# Patient Record
Sex: MALE | Employment: UNEMPLOYED | URBAN - METROPOLITAN AREA
[De-identification: names, ages, dates, MRNs, and addresses within clinical notes are randomized per-mention and may not be internally consistent; named-entity substitution may affect disease eponyms.]

---

## 2021-10-04 ENCOUNTER — APPOINTMENT (OUTPATIENT)
Dept: GENERAL RADIOLOGY | Age: 26
End: 2021-10-04
Payer: MEDICAID

## 2021-10-04 ENCOUNTER — HOSPITAL ENCOUNTER (EMERGENCY)
Age: 26
Discharge: HOME | End: 2021-10-04
Attending: EMERGENCY MEDICINE
Payer: MEDICAID

## 2021-10-04 VITALS
OXYGEN SATURATION: 100 % | WEIGHT: 220 LBS | HEART RATE: 66 BPM | SYSTOLIC BLOOD PRESSURE: 142 MMHG | RESPIRATION RATE: 16 BRPM | TEMPERATURE: 97.9 F | DIASTOLIC BLOOD PRESSURE: 89 MMHG

## 2021-10-04 DIAGNOSIS — S86.012A RUPTURE OF LEFT ACHILLES TENDON, INITIAL ENCOUNTER: Primary | ICD-10-CM

## 2021-10-04 DIAGNOSIS — Z23 NEED FOR TETANUS BOOSTER: ICD-10-CM

## 2021-10-04 DIAGNOSIS — S91.012A LACERATION OF LEFT ANKLE, INITIAL ENCOUNTER: ICD-10-CM

## 2021-10-04 PROCEDURE — 6360000002 HC RX W HCPCS: Performed by: STUDENT IN AN ORGANIZED HEALTH CARE EDUCATION/TRAINING PROGRAM

## 2021-10-04 PROCEDURE — 99283 EMERGENCY DEPT VISIT LOW MDM: CPT

## 2021-10-04 PROCEDURE — 29505 APPLICATION LONG LEG SPLINT: CPT

## 2021-10-04 PROCEDURE — 73610 X-RAY EXAM OF ANKLE: CPT

## 2021-10-04 PROCEDURE — 90715 TDAP VACCINE 7 YRS/> IM: CPT | Performed by: STUDENT IN AN ORGANIZED HEALTH CARE EDUCATION/TRAINING PROGRAM

## 2021-10-04 PROCEDURE — 6370000000 HC RX 637 (ALT 250 FOR IP): Performed by: STUDENT IN AN ORGANIZED HEALTH CARE EDUCATION/TRAINING PROGRAM

## 2021-10-04 PROCEDURE — 90471 IMMUNIZATION ADMIN: CPT | Performed by: STUDENT IN AN ORGANIZED HEALTH CARE EDUCATION/TRAINING PROGRAM

## 2021-10-04 RX ORDER — ACETAMINOPHEN 500 MG
1000 TABLET ORAL ONCE
Status: COMPLETED | OUTPATIENT
Start: 2021-10-04 | End: 2021-10-04

## 2021-10-04 RX ORDER — IBUPROFEN 400 MG/1
400 TABLET ORAL ONCE
Status: COMPLETED | OUTPATIENT
Start: 2021-10-04 | End: 2021-10-04

## 2021-10-04 RX ORDER — HYDROCODONE BITARTRATE AND ACETAMINOPHEN 5; 325 MG/1; MG/1
1 TABLET ORAL EVERY 6 HOURS PRN
Qty: 10 TABLET | Refills: 0 | Status: SHIPPED | OUTPATIENT
Start: 2021-10-04 | End: 2021-10-07

## 2021-10-04 RX ORDER — DOXYCYCLINE 100 MG/1
100 TABLET ORAL 2 TIMES DAILY
Qty: 20 TABLET | Refills: 0 | Status: ON HOLD | OUTPATIENT
Start: 2021-10-04 | End: 2021-10-08 | Stop reason: HOSPADM

## 2021-10-04 RX ORDER — LIDOCAINE HYDROCHLORIDE AND EPINEPHRINE 10; 10 MG/ML; UG/ML
20 INJECTION, SOLUTION INFILTRATION; PERINEURAL ONCE
Status: DISCONTINUED | OUTPATIENT
Start: 2021-10-04 | End: 2021-10-04 | Stop reason: HOSPADM

## 2021-10-04 RX ADMIN — TETANUS TOXOID, REDUCED DIPHTHERIA TOXOID AND ACELLULAR PERTUSSIS VACCINE, ADSORBED 0.5 ML: 5; 2.5; 8; 8; 2.5 SUSPENSION INTRAMUSCULAR at 17:23

## 2021-10-04 RX ADMIN — ACETAMINOPHEN 1000 MG: 500 TABLET ORAL at 16:26

## 2021-10-04 RX ADMIN — IBUPROFEN 400 MG: 400 TABLET, FILM COATED ORAL at 16:26

## 2021-10-04 ASSESSMENT — ENCOUNTER SYMPTOMS
TROUBLE SWALLOWING: 0
EYE PAIN: 0
ABDOMINAL PAIN: 0
COUGH: 0
RHINORRHEA: 0
VOMITING: 0
EYE REDNESS: 0
CONSTIPATION: 0
SHORTNESS OF BREATH: 0
COLOR CHANGE: 0
DIARRHEA: 0
SINUS PAIN: 0

## 2021-10-04 ASSESSMENT — PAIN SCALES - GENERAL
PAINLEVEL_OUTOF10: 8
PAINLEVEL_OUTOF10: 9

## 2021-10-04 ASSESSMENT — PAIN DESCRIPTION - LOCATION: LOCATION: ANKLE

## 2021-10-04 ASSESSMENT — PAIN DESCRIPTION - ORIENTATION: ORIENTATION: LEFT

## 2021-10-04 ASSESSMENT — PAIN DESCRIPTION - PAIN TYPE: TYPE: ACUTE PAIN

## 2021-10-04 NOTE — ED PROVIDER NOTES
Transportation Needs:     Lack of Transportation (Medical):  Lack of Transportation (Non-Medical):    Physical Activity:     Days of Exercise per Week:     Minutes of Exercise per Session:    Stress:     Feeling of Stress :    Social Connections:     Frequency of Communication with Friends and Family:     Frequency of Social Gatherings with Friends and Family:     Attends Orthodox Services:     Active Member of Clubs or Organizations:     Attends Club or Organization Meetings:     Marital Status:    Intimate Partner Violence:     Fear of Current or Ex-Partner:     Emotionally Abused:     Physically Abused:     Sexually Abused:        History reviewed. No pertinent family history. Allergies:  Patient has no known allergies. Home Medications:  Prior to Admission medications    Medication Sig Start Date End Date Taking? Authorizing Provider   doxycycline monohydrate (ADOXA) 100 MG tablet Take 1 tablet by mouth 2 times daily for 10 days 10/4/21 10/14/21 Yes Arabella Holder DO   HYDROcodone-acetaminophen (NORCO) 5-325 MG per tablet Take 1 tablet by mouth every 6 hours as needed for Pain for up to 3 days. 10/4/21 10/7/21 Yes Gregg Pastor MD       REVIEW OFSYSTEMS    (2-9 systems for level 4, 10 or more for level 5)      Review of Systems   Constitutional: Negative for chills and fever. HENT: Negative for congestion, rhinorrhea and trouble swallowing. Eyes: Negative for pain and redness. Respiratory: Negative for cough and shortness of breath. Cardiovascular: Negative for chest pain and palpitations. Gastrointestinal: Negative for abdominal pain and vomiting. Musculoskeletal: Positive for arthralgias and gait problem. Skin: Positive for wound. Negative for rash. Neurological: Negative for dizziness, syncope and headaches. Hematological: Does not bruise/bleed easily. Psychiatric/Behavioral: Negative for confusion.         Positive for feeling anxious       PHYSICAL EXAM   (up to 7 for level 4, 8 or more forlevel 5)      INITIAL VITALS:   ED Triage Vitals   BP Temp Temp Source Pulse Resp SpO2 Height Weight   10/04/21 1525 10/04/21 1522 10/04/21 1522 10/04/21 1522 10/04/21 1522 10/04/21 1522 -- 10/04/21 1522   (!) 142/89 97.9 °F (36.6 °C) Oral 66 16 100 %  220 lb (99.8 kg)       Physical Exam  Vitals reviewed. Constitutional:       Appearance: Normal appearance. He is not ill-appearing. HENT:      Head: Normocephalic and atraumatic. Right Ear: External ear normal.      Left Ear: External ear normal.      Nose: Nose normal.      Mouth/Throat:      Mouth: Mucous membranes are moist.   Eyes:      General:         Right eye: No discharge. Left eye: No discharge. Extraocular Movements: Extraocular movements intact. Pupils: Pupils are equal, round, and reactive to light. Cardiovascular:      Rate and Rhythm: Normal rate and regular rhythm. Pulses: Normal pulses. Comments: Bilateral DP pulses 2+  Pulmonary:      Effort: Pulmonary effort is normal. No respiratory distress. Musculoskeletal:      Cervical back: Normal range of motion. No rigidity. Comments: Laceration to posterior left ankle in the region of the Achilles tendon. Patient able to dorsiflex and plantarflex ankle. Patient able to wiggle toes without difficulty. When calf is squeezed there is no movement of the left foot. At rest left foot is resting in a slightly plantarflexed position. Skin:     General: Skin is warm. Capillary Refill: Capillary refill takes less than 2 seconds. Comments: Approximately 2 cm laceration to left posterior ankle. Bleeding well controlled. Neurological:      Mental Status: He is alert and oriented to person, place, and time. Cranial Nerves: No cranial nerve deficit.       Comments: Decreased sensation to left calf   Psychiatric:      Comments: Anxious         DIFFERENTIAL  DIAGNOSIS     PLAN (LABS / IMAGING / EKG):  Orders Placed This Encounter   Procedures    XR ANKLE LEFT (MIN 3 VIEWS)    Inpatient consult to Podiatry       MEDICATIONS ORDERED:  Orders Placed This Encounter   Medications    Tetanus-Diphth-Acell Pertussis (BOOSTRIX) injection 0.5 mL    acetaminophen (TYLENOL) tablet 1,000 mg    ibuprofen (ADVIL;MOTRIN) tablet 400 mg    lidocaine-EPINEPHrine 1 %-1:[id number] injection 20 mL    doxycycline monohydrate (ADOXA) 100 MG tablet     Sig: Take 1 tablet by mouth 2 times daily for 10 days     Dispense:  20 tablet     Refill:  0    HYDROcodone-acetaminophen (NORCO) 5-325 MG per tablet     Sig: Take 1 tablet by mouth every 6 hours as needed for Pain for up to 3 days. Dispense:  10 tablet     Refill:  0       DDX: Simple laceration, partial tear of Achilles tendon, complete tear of Achilles tendon, foreign body    Initial MDM/Plan: 27 y.o. male who presents with laceration to left ankle in the Achilles tendon region. Patient is able to plantarflex left foot however squeezing of the left calf does not result in complete plantarflexion. Concern for involvement of the Achilles tendon. Will obtain x-ray to rule out foreign body or fracture and possibly evaluate for Achilles tendon involvement. Will washout the laceration with further investigate for tendon involvement and close the laceration. Will update tetanus    DIAGNOSTIC RESULTS / EMERGENCYDEPARTMENT COURSE / MDM     LABS:  Labs Reviewed - No data to display      RADIOLOGY:  XR ANKLE LEFT (MIN 3 VIEWS)    Result Date: 10/4/2021  EXAMINATION: THREE XRAY VIEWS OF THE LEFT ANKLE 10/4/2021 3:55 pm COMPARISON: None. HISTORY: ORDERING SYSTEM PROVIDED HISTORY: laceration, eval for foreign body and tendon involvement TECHNOLOGIST PROVIDED HISTORY: laceration, eval for foreign body and tendon involvement FINDINGS: No evidence of acute fracture or dislocation. Normal alignment of the ankle mortise. No focal osseous lesion. No evidence of joint effusion.  No focal soft tissue abnormality. No radiodense foreign body. No acute abnormality of the ankle. No radiodense foreign body. EKG  None    All EKG's are interpreted by the Emergency Department Physicianwho either signs or Co-signs this chart in the absence of a cardiologist.    EMERGENCY DEPARTMENT COURSE:  ED Course as of Oct 04 1925   [facility] Oct 04, 2021   1617 X-ray left ankle negative for any foreign body or fracture    [AB]   1628 Reevaluated patient. Patient attempted to ambulate to the bathroom but was unable to walk. Podiatry consulted for further evaluation.    [AB]   [id number] with podiatry. State they will come to bedside to evaluate the patient.    [AB]   8933 Podiatry at bedside. After their evaluation they are concerned the Achilles tendon is completely severed and will need operative repair. After discussion with patient and since the patient ate today he will follow-up in their clinic in a few days and plan for operative fix in 7 to 10 days. Wound irrigated and dressed. Left lower leg splinted per podiatry in the emergency department. Patient will be discharged home on doxycycline. Patient given strict return precautions and how to reach the podiatry office.    [AB]      ED Course User Index  [AB] Aldean Blizzard, DO         PROCEDURES:  None    CONSULTS:  IP CONSULT TO PODIATRY    CRITICAL CARE:  None    FINAL IMPRESSION      1. Rupture of left Achilles tendon, initial encounter    2. Need for tetanus booster    3.  Laceration of left ankle, initial encounter        DISPOSITION / PLAN     DISPOSITION discharged      PATIENT REFERRED TO:  Juan Bobby DPM  [address]  [phone]    In 3 days  for pre-op evaluation    [facility]  [address] [facility] [address]  [phone]  Go to   As needed, If symptoms worsen      DISCHARGE MEDICATIONS:  Discharge Medication List as of 10/4/2021  6:19 PM      START taking these medications    Details doxycycline monohydrate (ADOXA) 100 MG tablet Take 1 tablet by mouth 2 times daily for 10 days, Disp-20 tablet, R-0Print             Paris Henley DO  Emergency Medicine Resident    (Please note that portions of this note were completed with a voice recognition program.Efforts were made to edit the dictations but occasionally words are mis-transcribed.)       Brigette Morse DO  Resident  10/04/21 1928

## 2021-10-04 NOTE — ED PROVIDER NOTES
[facility]  Emergency Department  Faculty Attestation     I performed a history and physical examination of the patient and discussed management with the resident. I reviewed the residents note and agree with the documented findings and plan of care. Any areas of disagreement are noted on the chart. I was personally present for the key portions of any procedures. I have documented in the chart those procedures where I was not present during the key portions. I have reviewed the emergency nurses triage note. I agree with the chief complaint, past medical history, past surgical history, allergies, medications, social and family history as documented unless otherwise noted below. For Physician Assistant/ Nurse Practitioner cases/documentation I have personally evaluated this patient and have completed at least one if not all key elements of the E/M (history, physical exam, and MDM). Additional findings are as noted. Primary Care Physician:  No primary care provider on file. Screenings:  [unfilled]    CHIEF COMPLAINT       Chief Complaint   Patient presents with    Laceration     left achilles tendon. RECENT VITALS:   Temp: 97.9 °F (36.6 °C),  Pulse: 66, Resp: 16, BP: (!) 142/89    LABS:  Labs Reviewed - No data to display    Radiology  No orders to display           Attending Physician Additional  Notes    Patient has laceration to his left Achilles tendon region. He was swinging a garbage bag which opened up and something within the back of his left ankle. He states the ankle felt weak and it made him stumble. He had a large amount of blood loss. He is worried that he cut his tendon. He is not up-to-date on tetanus. No antibiotic allergies. On exam he is anxious hypertensive afebrile nontoxic vital signs otherwise normal.  Normal pulses and sensation light touch. Full range of movement of the toes and ankle. He has normal plantar flexion. Squeezing the calf however does not result in a full plantar flexion. There is a 1.5 cm horizontal laceration above the calcaneus in the Achilles tendon region. There is no active bleeding. It is no visible foreign body. Impression is ankle laceration, possible Achilles tendon involvement, likely partial.  Plan is imaging, tetanus update, wound care including local anesthesia, copious irrigation, visual inspection through passive range of movement. Sutures, consider bedside ultrasound to assess tendon. If tendon involvement will consult orthopedics and recommend crutches and consider splinting in plantar flexion. Octavia Montnao.  Rupert Abdalla MD, [facility]  Attending Emergency  Physician               Ramona Henderson MD  10/04/21 7062

## 2021-10-04 NOTE — CONSULTS
Consultation Note  Podiatric Medicine and Surgery     Subjective     Chief Complaint: Left posterior heel laceration    HPI:  Stella Boggs is a 27 y.o. male seen at [facility] emergency department for laceration left posterior heel. Patient was taking his garbage noted swung the bag with high velocity contact was made with his left posterior heel at which time he had difficulty with his balance and noticed a large laceration which he immediately placed a makeshift tourniquet over. He has noted inconsistent pain and inability to balance and high levels of anxiety. He irrigated the wound with copious amounts of normal tap water prior to his arrival at the emergency department. His pain is currently controlled 2 out of 10. Patient has no other pedal complaints at this time    PCP is No primary care provider on file. ROS:   Review of Systems   HENT: Negative for rhinorrhea and sinus pain. Cardiovascular: Positive for leg swelling. Gastrointestinal: Negative for constipation and diarrhea. Genitourinary: Negative for frequency and hematuria. Musculoskeletal: Positive for myalgias. Negative for arthralgias. Skin: Positive for wound. Negative for color change and pallor. Psychiatric/Behavioral: Negative for agitation and confusion. Past Medical History   has no past medical history on file. Past Surgical History   has no past surgical history on file. Medications  Prior to Admission medications    Not on File    Scheduled Meds:   lidocaine-EPINEPHrine  20 mL IntraDERmal Once     Continuous Infusions:  PRN Meds:. Allergies  has No Known Allergies. Family History  family history is not on file. Social History   reports that he has never smoked. He has never used smokeless tobacco.   reports current alcohol use. reports current drug use. Drug: Marijuana.     Objective     Vitals:  Patient Vitals for the past 8 hrs:   BP Temp Temp src Pulse Resp SpO2 Weight   10/04/21 1525 (!) and free of all debris. There is no acute signs of infection noted. Imaging:   XR ANKLE LEFT (MIN 3 VIEWS)   Final Result   No acute abnormality of the ankle. No radiodense foreign body. Cultures: na    Assessment     Taylor Stauffer is a 27 y.o. male with   1. Left Achilles tendon laceration, complete, initial encounter  2. Difficulty walking    Active Problems:    Laceration of left ankle  Resolved Problems:    * No resolved hospital problems. *        Plan     · Patient examined and evaluated at bedside   · Treatment options discussed in detail with the patient  · Radiographs reviewed and discussed in detail with patient which demonstrate mild edema without any notable osseous pathology  · Reviewed Achilles laceration with the patient and the appropriate timeline the patient understands it we will be allowing the area to undergo its inflammatory reaction and plan on surgically intervening in approximately 7 to 10 days  · Wound was cleansed with copious amounts of normal saline solution and dressed with Adaptic 4 x 4's Kerlix  · Posterior splint was applied following the application of a Ford compression dressing to reduce edema and control pain  · Doxycycline will be as prescribed by the emergency department as well as pain medication  · NWB status to left lower extremity  · Discussed with Dr. Gabriel Cisneros, BALJINDERM   Podiatric Medicine & Surgery   10/4/2021 at 5:59 PM    This note is created with the assistance of a speech recognition program.  While intending to generate a document that actually reflects the content of the visit, the document can still have some errors including those of syntax and sound a like substitutions which may escape proof reading. In such instances, actual meaning can be extrapolated by contextual diversion. I performed a history and physical examination of the patient and discussed management with the resident.  I reviewed the residents note and agree with the documented findings and plan of care. Any areas of disagreement are noted on the chart. I was personally present for the key portions of any procedures. I have documented in the chart those procedures where I was not present during the key portions. I have reviewed the Podiatry Resident progress note. I agree with the chief complaint, past medical history, past surgical history, allergies, medications, social and family history as documented unless otherwise noted below. Documentation of the HPI, Physical Exam and Medical Decision Making performed by medical students or scribes is based on my personal performance of the HPI, PE and MDM. I have personally evaluated this patient and have completed at least one if not all key elements of the E/M (history, physical exam, and MDM). Additional findings are as noted.      Connor Marcos DPM on 10/5/2021 at 5:24 AM  Board Certified, American Board of Podiatric Surgery  Fellow, [employer]

## 2021-10-04 NOTE — ED NOTES
Patient stated he lost his ID one year ago and has issues filling prescriptions. Discussed with patient that he can fill a script for anti-biotics, however any narcotics prescribed will most likely need ID. GREG encouraged patient to obtain new ID for prescriptions and other purposes. He verbalized understanding.      GREG Busby  10/04/21 1091

## 2021-10-06 ENCOUNTER — OFFICE VISIT (OUTPATIENT)
Dept: PODIATRY | Age: 26
End: 2021-10-06
Payer: MEDICAID

## 2021-10-06 VITALS — BODY MASS INDEX: 29.8 KG/M2 | WEIGHT: 220 LBS | HEIGHT: 72 IN

## 2021-10-06 DIAGNOSIS — S86.022A LACERATION OF ACHILLES TENDON, LEFT, INITIAL ENCOUNTER: Primary | ICD-10-CM

## 2021-10-06 PROCEDURE — 99203 OFFICE O/P NEW LOW 30 MIN: CPT | Performed by: PODIATRIST

## 2021-10-06 PROCEDURE — G8427 DOCREV CUR MEDS BY ELIG CLIN: HCPCS | Performed by: PODIATRIST

## 2021-10-06 PROCEDURE — G8484 FLU IMMUNIZE NO ADMIN: HCPCS | Performed by: PODIATRIST

## 2021-10-06 PROCEDURE — 1036F TOBACCO NON-USER: CPT | Performed by: PODIATRIST

## 2021-10-06 PROCEDURE — G8419 CALC BMI OUT NRM PARAM NOF/U: HCPCS | Performed by: PODIATRIST

## 2021-10-06 ASSESSMENT — ENCOUNTER SYMPTOMS
VOMITING: 0
NAUSEA: 0
DIARRHEA: 0
COLOR CHANGE: 0
CONSTIPATION: 0

## 2021-10-06 NOTE — PROGRESS NOTES
for gait problem. Negative for arthralgias and joint swelling. Skin: Negative for color change, pallor, rash and wound. Neurological: Negative for weakness and numbness. Lower Extremity Physical Examination:     Vitals: There were no vitals filed for this visit. General: AAO x 3 in NAD. Vascular: DP and PT pulses palpable 2/4, Bilateral.  CFT <3 seconds, Bilateral.  Hair growth present to the level of the digits, Bilateral.  Edema present, Left. Varicosities absent, Bilateral. Erythema absent, Left    Neurological:  Sensation present to light touch to level of digits, Bilateral.    Musculoskeletal: Muscle strength unable to plantar flex left, able to use flexors, not achilles/5, Left. Pain present upon palpation of achilles area, Left. Palpable dell left achilles    Integument: Warm, dry, supple, Bilateral.  4 cm laceration left posterior heel, clean, no active bleeding. Gait analysis:     Asessment: Patient is a 27 y.o. male with:   1. Laceration of Achilles tendon, left, initial encounter          Plan: Patient examined and evaluated. Current condition and treatment options discussed in detail. Verbal and written instructions given to patient. Contact office with any questions/problems/concerns. No weight    I discussed in detail repair achilles left, repair laceration left. He/She was in full agreement and understood risks. The reason for surgery is due to unsuccessful non-operative treatment and/or conservative treatment is not a viable option. It was discussed with the patient that compliance postoperatively is of utmost importance. Any deviation on behalf of the patient will decrease the chances of a successful outcome. Patient acknowledged, understands, and would like to move forward with surgery as discussed. The patient was given a consent outlining the general risk of surgery as well as the specifics to the surgical plan.  This was carefully discussed giving all options, indications and contraindications regarding the procedure outlined in the consent. All questions were answered to the patient's satisfaction. The patient signed the consent form confirming complete understanding and acceptance of the risks of stated. I specifically stated and inquired if the patient understands and accepts the risks of surgery including infection, failure, prolonged pain, swelling, numbness, recurrence, limited mobility,painful scar, RSDS, overcorrection, under-correction, and loss of limb/life. Death, bleeding, blood clots in the veins or lungs, tendon or blood vessel disturbance, bony conditions, continued pain,stiffness, weakness, and limited function. These were all listed on the consent. Additionally, the postoperative course and treatment was outlined for the patient. Discussion consisted of postoperatively the patient needs to keep the foot elevated for at least the first initial two weeks. I have encouraged movements such as moving from the bed to the sofa or recliner, to the kitchen and the bathroom; quick bursts of movement with the foot elevated. Longstanding periods of time such as cooking, cleaning, and shopping are not permitted. I reminded the patient that there are only two reasons to have surgery. That being, if their function is impaired and also if they are having pain. If they can answer yes to both these questions, I will move forward with surgery. If they can not, there is no reason to proceed with surgical intervention. No orders of the defined types were placed in this encounter. No orders of the defined types were placed in this encounter. RTC in for surgery.     10/6/2021

## 2021-10-08 ENCOUNTER — ANESTHESIA EVENT (OUTPATIENT)
Dept: OPERATING ROOM | Age: 26
End: 2021-10-08
Payer: MEDICAID

## 2021-10-08 ENCOUNTER — ANESTHESIA (OUTPATIENT)
Dept: OPERATING ROOM | Age: 26
End: 2021-10-08
Payer: MEDICAID

## 2021-10-08 ENCOUNTER — HOSPITAL ENCOUNTER (OUTPATIENT)
Age: 26
Setting detail: OUTPATIENT SURGERY
Discharge: HOME | End: 2021-10-08
Attending: PODIATRIST | Admitting: PODIATRIST
Payer: MEDICAID

## 2021-10-08 VITALS — TEMPERATURE: 96.7 F | SYSTOLIC BLOOD PRESSURE: 108 MMHG | DIASTOLIC BLOOD PRESSURE: 53 MMHG | OXYGEN SATURATION: 100 %

## 2021-10-08 VITALS
HEIGHT: 72 IN | RESPIRATION RATE: 13 BRPM | OXYGEN SATURATION: 98 % | BODY MASS INDEX: 29.8 KG/M2 | HEART RATE: 68 BPM | WEIGHT: 220 LBS | SYSTOLIC BLOOD PRESSURE: 144 MMHG | TEMPERATURE: 98.4 F | DIASTOLIC BLOOD PRESSURE: 94 MMHG

## 2021-10-08 DIAGNOSIS — S86.012A RUPTURE ACHILLES TENDON, LEFT, INITIAL ENCOUNTER: ICD-10-CM

## 2021-10-08 DIAGNOSIS — S91.012D LACERATION OF LEFT ANKLE, SUBSEQUENT ENCOUNTER: Primary | ICD-10-CM

## 2021-10-08 LAB
SARS-COV-2, RAPID: NOT DETECTED
SPECIMEN DESCRIPTION: NORMAL

## 2021-10-08 PROCEDURE — 7100000010 HC PHASE II RECOVERY - FIRST 15 MIN: Performed by: PODIATRIST

## 2021-10-08 PROCEDURE — 3700000000 HC ANESTHESIA ATTENDED CARE: Performed by: PODIATRIST

## 2021-10-08 PROCEDURE — 3700000001 HC ADD 15 MINUTES (ANESTHESIA): Performed by: PODIATRIST

## 2021-10-08 PROCEDURE — C1713 ANCHOR/SCREW BN/BN,TIS/BN: HCPCS | Performed by: PODIATRIST

## 2021-10-08 PROCEDURE — 2580000003 HC RX 258: Performed by: PODIATRIST

## 2021-10-08 PROCEDURE — 7100000001 HC PACU RECOVERY - ADDTL 15 MIN: Performed by: PODIATRIST

## 2021-10-08 PROCEDURE — 6360000002 HC RX W HCPCS

## 2021-10-08 PROCEDURE — 3600000004 HC SURGERY LEVEL 4 BASE: Performed by: PODIATRIST

## 2021-10-08 PROCEDURE — 2580000003 HC RX 258

## 2021-10-08 PROCEDURE — 7100000000 HC PACU RECOVERY - FIRST 15 MIN: Performed by: PODIATRIST

## 2021-10-08 PROCEDURE — 2500000003 HC RX 250 WO HCPCS

## 2021-10-08 PROCEDURE — 2500000003 HC RX 250 WO HCPCS: Performed by: PODIATRIST

## 2021-10-08 PROCEDURE — 27650 REPAIR ACHILLES TENDON: CPT | Performed by: PODIATRIST

## 2021-10-08 PROCEDURE — 7100000011 HC PHASE II RECOVERY - ADDTL 15 MIN: Performed by: PODIATRIST

## 2021-10-08 PROCEDURE — 64445 NJX AA&/STRD SCIATIC NRV IMG: CPT | Performed by: ANESTHESIOLOGY

## 2021-10-08 PROCEDURE — 87635 SARS-COV-2 COVID-19 AMP PRB: CPT

## 2021-10-08 PROCEDURE — 2500000003 HC RX 250 WO HCPCS: Performed by: ANESTHESIOLOGY

## 2021-10-08 PROCEDURE — 2580000003 HC RX 258: Performed by: ANESTHESIOLOGY

## 2021-10-08 PROCEDURE — 64447 NJX AA&/STRD FEMORAL NRV IMG: CPT | Performed by: ANESTHESIOLOGY

## 2021-10-08 PROCEDURE — 6360000002 HC RX W HCPCS: Performed by: ANESTHESIOLOGY

## 2021-10-08 PROCEDURE — 3600000014 HC SURGERY LEVEL 4 ADDTL 15MIN: Performed by: PODIATRIST

## 2021-10-08 PROCEDURE — 2709999900 HC NON-CHARGEABLE SUPPLY: Performed by: PODIATRIST

## 2021-10-08 DEVICE — SYSTEM IMPL INCL NDL 1.3MM WHT WHT/BLUE WHT/BLACK: Type: IMPLANTABLE DEVICE | Site: ACHILLES TENDON | Status: FUNCTIONAL

## 2021-10-08 RX ORDER — LIDOCAINE HYDROCHLORIDE 10 MG/ML
INJECTION, SOLUTION EPIDURAL; INFILTRATION; INTRACAUDAL; PERINEURAL PRN
Status: DISCONTINUED | OUTPATIENT
Start: 2021-10-08 | End: 2021-10-08 | Stop reason: SDUPTHER

## 2021-10-08 RX ORDER — DEXAMETHASONE SODIUM PHOSPHATE 10 MG/ML
INJECTION INTRAMUSCULAR; INTRAVENOUS PRN
Status: DISCONTINUED | OUTPATIENT
Start: 2021-10-08 | End: 2021-10-08 | Stop reason: SDUPTHER

## 2021-10-08 RX ORDER — PROPOFOL 10 MG/ML
INJECTION, EMULSION INTRAVENOUS PRN
Status: DISCONTINUED | OUTPATIENT
Start: 2021-10-08 | End: 2021-10-08 | Stop reason: SDUPTHER

## 2021-10-08 RX ORDER — BUPIVACAINE HYDROCHLORIDE 5 MG/ML
INJECTION, SOLUTION EPIDURAL; INTRACAUDAL
Status: DISCONTINUED | OUTPATIENT
Start: 2021-10-08 | End: 2021-10-08 | Stop reason: SDUPTHER

## 2021-10-08 RX ORDER — BUPIVACAINE HYDROCHLORIDE 5 MG/ML
INJECTION, SOLUTION EPIDURAL; INTRACAUDAL PRN
Status: DISCONTINUED | OUTPATIENT
Start: 2021-10-08 | End: 2021-10-08 | Stop reason: ALTCHOICE

## 2021-10-08 RX ORDER — NEOSTIGMINE METHYLSULFATE 5 MG/5 ML
SYRINGE (ML) INTRAVENOUS PRN
Status: DISCONTINUED | OUTPATIENT
Start: 2021-10-08 | End: 2021-10-08 | Stop reason: SDUPTHER

## 2021-10-08 RX ORDER — OXYCODONE AND ACETAMINOPHEN 10; 325 MG/1; MG/1
1 TABLET ORAL EVERY 6 HOURS PRN
Qty: 28 TABLET | Refills: 0 | Status: SHIPPED | OUTPATIENT
Start: 2021-10-08 | End: 2021-10-18 | Stop reason: SDUPTHER

## 2021-10-08 RX ORDER — FENTANYL CITRATE 50 UG/ML
25 INJECTION, SOLUTION INTRAMUSCULAR; INTRAVENOUS EVERY 5 MIN PRN
Status: DISCONTINUED | OUTPATIENT
Start: 2021-10-08 | End: 2021-10-08 | Stop reason: HOSPADM

## 2021-10-08 RX ORDER — MIDAZOLAM HYDROCHLORIDE 1 MG/ML
INJECTION INTRAMUSCULAR; INTRAVENOUS PRN
Status: DISCONTINUED | OUTPATIENT
Start: 2021-10-08 | End: 2021-10-08 | Stop reason: SDUPTHER

## 2021-10-08 RX ORDER — HYDROCODONE BITARTRATE AND ACETAMINOPHEN 5; 325 MG/1; MG/1
1 TABLET ORAL EVERY 6 HOURS PRN
Status: ON HOLD | COMMUNITY
End: 2021-10-08 | Stop reason: HOSPADM

## 2021-10-08 RX ORDER — SODIUM CHLORIDE, SODIUM LACTATE, POTASSIUM CHLORIDE, CALCIUM CHLORIDE 600; 310; 30; 20 MG/100ML; MG/100ML; MG/100ML; MG/100ML
INJECTION, SOLUTION INTRAVENOUS CONTINUOUS PRN
Status: DISCONTINUED | OUTPATIENT
Start: 2021-10-08 | End: 2021-10-08 | Stop reason: SDUPTHER

## 2021-10-08 RX ORDER — FENTANYL CITRATE 50 UG/ML
INJECTION, SOLUTION INTRAMUSCULAR; INTRAVENOUS PRN
Status: DISCONTINUED | OUTPATIENT
Start: 2021-10-08 | End: 2021-10-08 | Stop reason: SDUPTHER

## 2021-10-08 RX ORDER — ONDANSETRON 2 MG/ML
INJECTION INTRAMUSCULAR; INTRAVENOUS PRN
Status: DISCONTINUED | OUTPATIENT
Start: 2021-10-08 | End: 2021-10-08 | Stop reason: SDUPTHER

## 2021-10-08 RX ORDER — FENTANYL CITRATE 50 UG/ML
50 INJECTION, SOLUTION INTRAMUSCULAR; INTRAVENOUS EVERY 5 MIN PRN
Status: DISCONTINUED | OUTPATIENT
Start: 2021-10-08 | End: 2021-10-08 | Stop reason: HOSPADM

## 2021-10-08 RX ORDER — SODIUM CHLORIDE, SODIUM LACTATE, POTASSIUM CHLORIDE, CALCIUM CHLORIDE 600; 310; 30; 20 MG/100ML; MG/100ML; MG/100ML; MG/100ML
INJECTION, SOLUTION INTRAVENOUS CONTINUOUS
Status: DISCONTINUED | OUTPATIENT
Start: 2021-10-08 | End: 2021-10-08 | Stop reason: HOSPADM

## 2021-10-08 RX ORDER — MIDAZOLAM HYDROCHLORIDE 2 MG/2ML
2 INJECTION, SOLUTION INTRAMUSCULAR; INTRAVENOUS ONCE
Status: COMPLETED | OUTPATIENT
Start: 2021-10-08 | End: 2021-10-08

## 2021-10-08 RX ORDER — GLYCOPYRROLATE 1 MG/5 ML
SYRINGE (ML) INTRAVENOUS PRN
Status: DISCONTINUED | OUTPATIENT
Start: 2021-10-08 | End: 2021-10-08 | Stop reason: SDUPTHER

## 2021-10-08 RX ORDER — ROCURONIUM BROMIDE 10 MG/ML
INJECTION, SOLUTION INTRAVENOUS PRN
Status: DISCONTINUED | OUTPATIENT
Start: 2021-10-08 | End: 2021-10-08 | Stop reason: SDUPTHER

## 2021-10-08 RX ORDER — MAGNESIUM HYDROXIDE 1200 MG/15ML
LIQUID ORAL CONTINUOUS PRN
Status: COMPLETED | OUTPATIENT
Start: 2021-10-08 | End: 2021-10-08

## 2021-10-08 RX ADMIN — ONDANSETRON 4 MG: 2 INJECTION, SOLUTION INTRAMUSCULAR; INTRAVENOUS at 14:45

## 2021-10-08 RX ADMIN — MIDAZOLAM HYDROCHLORIDE 2 MG: 1 INJECTION, SOLUTION INTRAMUSCULAR; INTRAVENOUS at 13:09

## 2021-10-08 RX ADMIN — FENTANYL CITRATE 50 MCG: 50 INJECTION, SOLUTION INTRAMUSCULAR; INTRAVENOUS at 13:38

## 2021-10-08 RX ADMIN — FENTANYL CITRATE 50 MCG: 50 INJECTION, SOLUTION INTRAMUSCULAR; INTRAVENOUS at 13:17

## 2021-10-08 RX ADMIN — BUPIVACAINE HYDROCHLORIDE 25 ML: 5 INJECTION, SOLUTION EPIDURAL; INTRACAUDAL; PERINEURAL at 15:34

## 2021-10-08 RX ADMIN — PROPOFOL 300 MG: 10 INJECTION, EMULSION INTRAVENOUS at 13:17

## 2021-10-08 RX ADMIN — LIDOCAINE HYDROCHLORIDE 50 MG: 10 INJECTION, SOLUTION EPIDURAL; INFILTRATION; INTRACAUDAL; PERINEURAL at 13:17

## 2021-10-08 RX ADMIN — ROCURONIUM BROMIDE 50 MG: 10 INJECTION INTRAVENOUS at 13:17

## 2021-10-08 RX ADMIN — PROPOFOL 150 MCG/KG/MIN: 10 INJECTION, EMULSION INTRAVENOUS at 13:30

## 2021-10-08 RX ADMIN — SODIUM CHLORIDE, POTASSIUM CHLORIDE, SODIUM LACTATE AND CALCIUM CHLORIDE: 600; 310; 30; 20 INJECTION, SOLUTION INTRAVENOUS at 13:09

## 2021-10-08 RX ADMIN — Medication 0.4 MG: at 15:05

## 2021-10-08 RX ADMIN — SODIUM CHLORIDE, POTASSIUM CHLORIDE, SODIUM LACTATE AND CALCIUM CHLORIDE: 600; 310; 30; 20 INJECTION, SOLUTION INTRAVENOUS at 13:12

## 2021-10-08 RX ADMIN — CEFAZOLIN 2000 MG: 10 INJECTION, POWDER, FOR SOLUTION INTRAVENOUS at 13:42

## 2021-10-08 RX ADMIN — FENTANYL CITRATE 50 MCG: 50 INJECTION INTRAMUSCULAR; INTRAVENOUS at 15:33

## 2021-10-08 RX ADMIN — BUPIVACAINE HYDROCHLORIDE 20 ML: 5 INJECTION, SOLUTION EPIDURAL; INTRACAUDAL at 15:37

## 2021-10-08 RX ADMIN — Medication 3 MG: at 15:05

## 2021-10-08 RX ADMIN — MIDAZOLAM HYDROCHLORIDE 2 MG: 1 INJECTION, SOLUTION INTRAMUSCULAR; INTRAVENOUS at 15:13

## 2021-10-08 RX ADMIN — FENTANYL CITRATE 50 MCG: 50 INJECTION INTRAMUSCULAR; INTRAVENOUS at 15:27

## 2021-10-08 RX ADMIN — DEXAMETHASONE SODIUM PHOSPHATE 10 MG: 10 INJECTION INTRAMUSCULAR; INTRAVENOUS at 13:42

## 2021-10-08 ASSESSMENT — PULMONARY FUNCTION TESTS
PIF_VALUE: 20
PIF_VALUE: 18
PIF_VALUE: 19
PIF_VALUE: 20
PIF_VALUE: 18
PIF_VALUE: 8
PIF_VALUE: 18
PIF_VALUE: 16
PIF_VALUE: 19
PIF_VALUE: 18
PIF_VALUE: 20
PIF_VALUE: 18
PIF_VALUE: 1
PIF_VALUE: 18
PIF_VALUE: 4
PIF_VALUE: 18
PIF_VALUE: 19
PIF_VALUE: 3
PIF_VALUE: 20
PIF_VALUE: 16
PIF_VALUE: 18
PIF_VALUE: 19
PIF_VALUE: 9
PIF_VALUE: 31
PIF_VALUE: 17
PIF_VALUE: 18
PIF_VALUE: 15
PIF_VALUE: 18
PIF_VALUE: 18
PIF_VALUE: 6
PIF_VALUE: 19
PIF_VALUE: 19
PIF_VALUE: 17
PIF_VALUE: 18
PIF_VALUE: 10
PIF_VALUE: 18
PIF_VALUE: 18
PIF_VALUE: 20
PIF_VALUE: 18
PIF_VALUE: 18
PIF_VALUE: 19
PIF_VALUE: 19
PIF_VALUE: 18
PIF_VALUE: 20
PIF_VALUE: 19
PIF_VALUE: 18
PIF_VALUE: 21
PIF_VALUE: 19
PIF_VALUE: 20
PIF_VALUE: 18
PIF_VALUE: 4
PIF_VALUE: 19
PIF_VALUE: 18
PIF_VALUE: 17
PIF_VALUE: 19
PIF_VALUE: 18
PIF_VALUE: 20
PIF_VALUE: 19
PIF_VALUE: 18
PIF_VALUE: 5
PIF_VALUE: 19
PIF_VALUE: 19
PIF_VALUE: 18
PIF_VALUE: 18
PIF_VALUE: 20
PIF_VALUE: 18
PIF_VALUE: 20
PIF_VALUE: 18
PIF_VALUE: 20
PIF_VALUE: 18
PIF_VALUE: 10
PIF_VALUE: 18
PIF_VALUE: 1
PIF_VALUE: 24
PIF_VALUE: 18
PIF_VALUE: 18
PIF_VALUE: 19
PIF_VALUE: 1
PIF_VALUE: 17
PIF_VALUE: 20
PIF_VALUE: 0
PIF_VALUE: 18
PIF_VALUE: 19
PIF_VALUE: 19
PIF_VALUE: 18
PIF_VALUE: 18
PIF_VALUE: 19
PIF_VALUE: 18
PIF_VALUE: 18
PIF_VALUE: 19
PIF_VALUE: 18
PIF_VALUE: 19
PIF_VALUE: 17
PIF_VALUE: 20
PIF_VALUE: 18
PIF_VALUE: 17
PIF_VALUE: 4
PIF_VALUE: 20
PIF_VALUE: 18
PIF_VALUE: 20
PIF_VALUE: 18
PIF_VALUE: 20
PIF_VALUE: 14
PIF_VALUE: 19
PIF_VALUE: 18
PIF_VALUE: 18
PIF_VALUE: 19
PIF_VALUE: 19

## 2021-10-08 ASSESSMENT — PAIN SCALES - GENERAL
PAINLEVEL_OUTOF10: 10
PAINLEVEL_OUTOF10: 10
PAINLEVEL_OUTOF10: 0

## 2021-10-08 ASSESSMENT — PAIN - FUNCTIONAL ASSESSMENT: PAIN_FUNCTIONAL_ASSESSMENT: 0-10

## 2021-10-08 ASSESSMENT — PAIN DESCRIPTION - PAIN TYPE: TYPE: SURGICAL PAIN

## 2021-10-08 ASSESSMENT — LIFESTYLE VARIABLES: SMOKING_STATUS: 1

## 2021-10-08 ASSESSMENT — PAIN DESCRIPTION - DESCRIPTORS: DESCRIPTORS: PRESSURE

## 2021-10-08 ASSESSMENT — PAIN DESCRIPTION - LOCATION: LOCATION: ANKLE

## 2021-10-08 ASSESSMENT — PAIN DESCRIPTION - ORIENTATION: ORIENTATION: LEFT

## 2021-10-08 ASSESSMENT — ENCOUNTER SYMPTOMS: SHORTNESS OF BREATH: 0

## 2021-10-08 NOTE — ANESTHESIA PROCEDURE NOTES
Peripheral Block    Patient location during procedure: pre-op  Start time: 10/8/2021 3:35 PM  End time: 10/8/2021 3:37 PM  Staffing  Performed: anesthesiologist   Anesthesiologist: Nahomy Gutierrez MD  Preanesthetic Checklist  Completed: patient identified, IV checked, site marked, risks and benefits discussed, surgical consent, monitors and equipment checked, pre-op evaluation, timeout performed, anesthesia consent given, oxygen available and patient being monitored  Peripheral Block  Patient position: supine  Prep: ChloraPrep  Patient monitoring: cardiac monitor, continuous pulse ox, frequent blood pressure checks and IV access  Block type: Femoral  Laterality: left  Injection technique: single-shot  Guidance: ultrasound guided  Local infiltration: lidocaine  Infiltration strength: 1 %  Dose: 3 mL  Adductor canal  Provider prep: mask and sterile gloves  Local infiltration: lidocaine  Needle  Needle type: combined needle/nerve stimulator   Needle gauge: 20 G  Needle length: 10 cm  Needle localization: ultrasound guidance  Assessment  Injection assessment: negative aspiration for heme, no paresthesia on injection and local visualized surrounding nerve on ultrasound  Paresthesia pain: none  Slow fractionated injection: yes  Hemodynamics: stable  Additional Notes  U/S [id number].  (1) Under ultrasound guidance, a 20 gauge needle was inserted and placed in close proximity to the femoral nerve in the adductor canal.  (2) Ultrasound was also used to visualize the spread of the anesthetic in close proximity to the nerve being blocked. (3) The nerve appeared anatomically normal, and (4) there were no apparent abnormal pathological findings on the image that were readily visible and related to the nerve being blocked. (5) A permanent ultrasound image was saved in the patient's record.             Medications Administered  Bupivacaine (MARCAINE) PF injection 0.5%, 20 mL  Reason for block: post-op pain management and at surgeon's request

## 2021-10-08 NOTE — ANESTHESIA PRE PROCEDURE
Department of Anesthesiology  Preprocedure Note       Name:  Madisyn Cuello   Age:  27 y.o.  :  1995                                          MRN:  [id number]         Date:  10/8/2021      Surgeon: Jh Query):  Jazzy Mckeon DPM    Procedure: Procedure(s):  ACHILLES TENDON REPAIR LEFT, LACERATION REPAIR LEFT FOOT/ANKLE, [address] Jazzy Alonso [address]onaki- OFFICE NOTIFYING, PRONE POSITION    Medications prior to admission:   Prior to Admission medications    Medication Sig Start Date End Date Taking? Authorizing Provider   doxycycline monohydrate (ADOXA) 100 MG tablet Take 1 tablet by mouth 2 times daily for 10 days 10/4/21 10/14/21  Naresh Colby, DO       Current medications:    No current facility-administered medications for this encounter. Allergies:  No Known Allergies    Problem List:    Patient Active Problem List   Diagnosis Code    Laceration of left ankle S91.012A       Past Medical History:  No past medical history on file. Past Surgical History:  No past surgical history on file. Social History:    Social History     Tobacco Use    Smoking status: Never Smoker    Smokeless tobacco: Never Used   Substance Use Topics    Alcohol use: Yes     Comment: socially                                Counseling given: Not Answered      Vital Signs (Current): There were no vitals filed for this visit.                                            BP Readings from Last 3 Encounters:   10/04/21 (!) 142/89       NPO Status:                                                                                 BMI:   Wt Readings from Last 3 Encounters:   10/06/21 220 lb (99.8 kg)   10/04/21 220 lb (99.8 kg)     There is no height or weight on file to calculate BMI.    CBC: No results found for: WBC, RBC, HGB, HCT, MCV, RDW, PLT    CMP: No results found for: NA, K, CL, CO2, BUN, CREATININE, GFRAA, AGRATIO, LABGLOM, GLUCOSE, PROT, CALCIUM, BILITOT, ALKPHOS, AST, ALT    POC Tests: No results for input(s): POCGLU, POCNA, POCK, POCCL, Byronjohan Lisa, POCHCT in the last 72 hours. Coags: No results found for: PROTIME, INR, APTT    HCG (If Applicable): No results found for: PREGTESTUR, PREGSERUM, HCG, HCGQUANT     ABGs: No results found for: PHART, PO2ART, BDI7HAR, UJW8PHL, BEART, U5KCOUDQ     Type & Screen (If Applicable):  No results found for: LABABO, LABRH    Drug/Infectious Status (If Applicable):  No results found for: HIV, HEPCAB    COVID-19 Screening (If Applicable):   Lab Results   Component Value Date    COVID19 Not Detected 10/08/2021           Anesthesia Evaluation  Patient summary reviewed and Nursing notes reviewed no history of anesthetic complications:   Airway: Mallampati: I  TM distance: >3 FB   Neck ROM: full  Mouth opening: > = 3 FB Dental: normal exam         Pulmonary:normal exam  breath sounds clear to auscultation  (+) current smoker    (-) COPD, asthma, shortness of breath, recent URI and sleep apnea                           Cardiovascular:Negative CV ROS  Exercise tolerance: good (>4 METS),       (-) hypertension, past MI, CAD, CABG/stent,  angina,  CHF, orthopnea and  CORDOBA      Rhythm: regular  Rate: normal                    Neuro/Psych:   Negative Neuro/Psych ROS     (-) seizures, TIA and CVA           GI/Hepatic/Renal: Neg GI/Hepatic/Renal ROS       (-) GERD       Endo/Other:        (-) diabetes mellitus                ROS comment: L achilles injury Abdominal:             Vascular: negative vascular ROS. Other Findings:           Anesthesia Plan      general and regional     ASA 2     (GA ET  Popliteal/saphenous blocks)  Induction: intravenous. MIPS: Postoperative opioids intended and Prophylactic antiemetics administered. Anesthetic plan and risks discussed with patient.       Plan discussed with CRNA and surgical team.                Rory Grant MD   10/8/2021

## 2021-10-08 NOTE — OP NOTE
PODIATRY BRIEF OP NOTE    PATIENT NAME: Abdelrahman Mendoza  YOB: 1995  -  27 y.o. male  MRN: [id number]  DATE: 10/8/2021  BILLING #: [id number]    Surgeon(s):  Marita Ray DPM     ASSISTANTS: Denita Lewis DPM     PRE-OP DIAGNOSIS:   Acute laceration left Achilles tendon, complete  Rupture achilles tendon left    POST-OP DIAGNOSIS: Same as above. PROCEDURE:   Repair of acute left Achilles tendon laceration, complete    ANESTHESIA: General    HEMOSTASIS: Pneumatic ankle tourniquet @250 mmHg for 75 minutes. ESTIMATED BLOOD LOSS: Less than 20 cc. MATERIALS:   Implant Name Type Inv. Item Serial No.  Lot No. LRB No. Used Action   SYSTEM IMPL INCL NDL 1.3MM WHT WHT/BLUE WHT/BLACK  SYSTEM IMPL INCL NDL 1.3MM WHT WHT/BLUE WHT/BLACK  [employer] [id number] Left 1 Implanted       INJECTABLES: 10 mL of 0.5% Marcaine plain    SPECIMEN:   * No specimens in log *    COMPLICATIONS: None    FINDINGS: Left Achilles tendon complete acute laceration at approximately watershed area without mopping of residual tendon proximal and distal portions, no damage to surrounding tissues vessels or nerves was noted    Indications for procedure:  Patient has had an acute Achilles tendon rupture while taking out garbage move. It was a complete laceration through the skin through the entire Achilles tendon because of the rupture. Patient understands the risk of surgery and the risk of conservative treatment and wishes to try to maintain full use of his left lower extremity. Patient wishes to move forward with surgery at this time. Risks and benefits discussed with patient no guarantees given or implied consent signed in chart. PROCEDURE IN DETAIL: The patient was transported from pre-op to the operating room and placed on the operating table in the prone position, once adequate anesthesia was given and intubation performed. A safety strap was placed across the lap.  A pneumatic thigh tourniquet was placed about the patient's left thigh. The lower extremity was then scrubbed, prepped and draped in the usual aseptic manner. The lower extremity was elevated and exsanguinated using an Esmarch bandage. The tourniquet was then inflated. Attention was then directed to the posterior heel where a linear incision was present from the acute laceration. Medially the incision was extended proximally to reveal the proximal stump of the remaining gastroc complex. An Allis clamp was used and the Achilles was reapproximated while plantar flexing the left foot. After reapproximation the Arthrex PARS system was used according to 's instructions suture was passed percutaneously creating locking sutures and hand ties were used on the proximal and distal portions to reapproximate the Achilles tendon. Anatomic alignment was noted with the left foot and plantarflexed position and the distal and proximal ends were ideally reapproximated. The areas rinsed copious amounts normal saline solution and closure was performed with 2-0 Vicryl, 4-0 Vicryl, 4-0 Prolene on skin. The tourniquet was deflated and prompt capillary response was noted to the left lower extremity. Next a posterior splint was applied with care taken to keep the foot in gravity equinus. The patient tolerated the above procedure and anesthesia well without complications. The patient was transported from the operating room to the PACU with vital signs stable and vascular status intact to the right foot. The patient was counseled at length about the risks of cely Covid-19 during their perioperative period and any recovery window from their procedure. The patient was made aware that cely Covid-19  may worsen their prognosis for recovering from their procedure  and lend to a higher morbidity and/or mortality risk. All material risks, benefits, and reasonable alternatives including postponing the procedure were discussed.  The patient does wish to proceed with the procedure at this time.     Rosalnia Boswell DPM   Podiatric Medicine & Surgery   10/8/2021 at 3:29 PM

## 2021-10-08 NOTE — BRIEF OP NOTE
PODIATRY BRIEF OP NOTE    PATIENT NAME: Willem Pritchard  YOB: 1995  -  27 y.o. male  MRN: [id number]  DATE: 10/8/2021  BILLING #: [id number]    Surgeon(s):  Dimitry Aguilera DPM     ASSISTANTS: Gavin Nagel DPM     PRE-OP DIAGNOSIS:   Acute laceration left Achilles tendon, complete  Achilles tendon rupture left    POST-OP DIAGNOSIS: Same as above. PROCEDURE:   Repair of acute left Achilles tendon laceration, complete    ANESTHESIA: General    HEMOSTASIS: Pneumatic ankle tourniquet @250 mmHg for 75 minutes. ESTIMATED BLOOD LOSS: Less than 20 cc. MATERIALS:   Implant Name Type Inv. Item Serial No.  Lot No. LRB No. Used Action   SYSTEM IMPL INCL NDL 1.3MM WHT WHT/BLUE WHT/BLACK  SYSTEM IMPL INCL NDL 1.3MM WHT WHT/BLUE WHT/BLACK  [employer] [id number] Left 1 Implanted       INJECTABLES: 10 mL of 0.5% Marcaine plain    SPECIMEN:   * No specimens in log *    COMPLICATIONS: None    FINDINGS: Left Achilles tendon complete acute laceration at approximately watershed area without mopping of residual tendon proximal and distal portions, no damage to surrounding tissues vessels or nerves was noted    The patient was counseled at length about the risks of cely Covid-19 during their perioperative period and any recovery window from their procedure. The patient was made aware that cely Covid-19  may worsen their prognosis for recovering from their procedure  and lend to a higher morbidity and/or mortality risk. All material risks, benefits, and reasonable alternatives including postponing the procedure were discussed. The patient does wish to proceed with the procedure at this time.     Gavin Nagel DPM   Podiatric Medicine & Surgery   10/8/2021 at 3:29 PM

## 2021-10-08 NOTE — H&P
History and Physical    Pt Name: Terri Edmonds  MRN: [id number]  YOB: 1995  Date of evaluation: 10/8/2021    SUBJECTIVE:   History of Chief Complaint:    Patient presents preprocedure for left Achilles tendon repair. He says that he was carrying garbage bags and one was caught behind him. He swung the bag and thinks that there was a piece of glass or something else sharp that cut the heel. He had pain after and says that he fell to the ground. He was seen at the ER, diagnosed with Achilles tendon laceration, has been scheduled for repair today. Past Medical History    has a past medical history of Achilles rupture, left. Past Surgical History   has a past surgical history that includes Finger surgery. Medications  Prior to Admission medications    Medication Sig Start Date End Date Taking? Authorizing Provider   HYDROcodone-acetaminophen (NORCO) 5-325 MG per tablet Take 1 tablet by mouth every 6 hours as needed for Pain. Yes Historical Provider, MD   doxycycline monohydrate (ADOXA) 100 MG tablet Take 1 tablet by mouth 2 times daily for 10 days 10/4/21 10/14/21 Yes Stark Kary, DO     Allergies  has No Known Allergies. Family History  family history includes Breast Cancer in his maternal grandmother; Colon Cancer in his father. Social History   reports that he has never smoked. He has never used smokeless tobacco.   reports current alcohol use. reports current drug use. Drug: Marijuana.   Marital Status single  Occupation none    REVIEW OF SYSTEMS    Constitutional: [] fever  [] chills  [] weight loss  []weakness [x] negative  Eyes:  [] photophobia  [] discharge [] acuity change   [] Diplopia   [] negative  HENT:  [] sore throat  [] ear pain [] Tinnitus   [x] negative  Respiratory:  [x] Cough  [] Shortness of breath   [] Sputum   [] negative  Cough he relates to smoking  Cardiac: []Chest pain   []Palpitations []Edema  []PND  [x] negative  GI:  []Abdominal pain   []Nausea  []Vomiting []Diarrhea  [x] negative  :  [] Dysuria   []Frequency  []Hematuria  []Discharge  [x] negative  Musculoskeletal:  []Back pain  []Neck pain  [x]Recent Injury [] negative  See HPI  Skin:  []Rash  [] Itching  [] negative  Heel/achilles laceration, bandaged  Neurologic:  [] Headache  [] Focal weakness  [] Sensory changes [x]negative  Endocrine:  [] Polyuria  [] Polydipsia  [] Hair Loss  [x] negative  Lymphatic:   [] Swollen glands [x] negative  Psychiatric:  [] Depression  [] Suicidal ideation  [] Homicidal ideation  [] Anxiety [x] negative  All systems negative except as marked. OBJECTIVE:   VITALS:  height is 6' (1.829 m) and weight is 220 lb (99.8 kg). His temporal temperature is 97 °F (36.1 °C). His blood pressure is 130/87 and his pulse is 87. His respiration is 18 and oxygen saturation is 99%. CONSTITUTIONAL:alert & cooperative, no acute distress. SKIN:  Warm and dry, no rashes on exposed areas of skin. Many tattoos present on the skin. HEAD:  Normocephalic, atraumatic. EYES: EOMs intact. EARS:  Hearing grossly WNL. NOSE:  Nares patent. No rhinorrhea. MOUTH/THROAT:  benign  NECK:supple, no lymphadenopathy  LUNGS: Clear to auscultation bilaterally, no wheezes. CARDIOVASCULAR: Heart sounds are normal.  Regular rate and rhythm without murmur. ABDOMEN: soft, non tender, non distended. EXTREMITIES: no edema bilateral lower extremities. IMPRESSIONS:   Left Achilles tendon injury/laceration   has a past medical history of Achilles rupture, left.    PLANS:   Left Achilles tendon repair    Albino Dong PA-C  Electronically signed 10/8/2021 at 12:33 PM    Electronically signed by Charla Andujar DPM on 10/8/2021 at 3:19 PM

## 2021-10-08 NOTE — ANESTHESIA POSTPROCEDURE EVALUATION
Department of Anesthesiology  Postprocedure Note    Patient: Sherrie Mcintyre  MRN: [id number]  [place]: 1995  Date of evaluation: 10/8/2021  Time:  4:47 PM     Procedure Summary     Date: 10/08/21 Room / Location: [address]    Anesthesia Start: 7729 Anesthesia Stop: 7865    Procedure: ACHILLES TENDON REPAIR LEFT, LACERATION REPAIR LEFT ANKLE (N/A ) Diagnosis: (ACHILLES TENDON LACERATION LEFT)    Surgeons: Charla Andujar DPM Responsible Provider: Alecia Vegas MD    Anesthesia Type: general, regional ASA Status: 2          Anesthesia Type: general, regional    Jass Phase I: Jass Score: 8    Jass Phase II:      Last vitals: Reviewed and per EMR flowsheets.        Anesthesia Post Evaluation    Patient location during evaluation: PACU  Patient participation: complete - patient participated  Level of consciousness: awake and alert  Pain score: 0  Airway patency: patent  Nausea & Vomiting: no nausea and no vomiting  Complications: no  Cardiovascular status: hemodynamically stable  Respiratory status: acceptable, spontaneous ventilation and room air  Hydration status: euvolemic

## 2021-10-11 NOTE — OP NOTE
OP NOTE    PATIENT NAME: Anupama Calle  YOB: 1995  -  27 y.o. male  MRN: [id number]  DATE: 10/8/2021  BILLING #: [id number]    Surgeon(s):  Zhang Osborn DPM     ASSISTANTS: Jenna De La Torre DPM     PRE-OP DIAGNOSIS:   1. Acute laceration left Achilles tendon, complete  2. Rupture achilles tendon left    POST-OP DIAGNOSIS: Same as above. PROCEDURE:   1. Repair of acute left Achilles tendon laceration, complete    ANESTHESIA: General    HEMOSTASIS: Pneumatic ankle tourniquet @250 mmHg for 75 minutes. ESTIMATED BLOOD LOSS: Less than 20 cc. MATERIALS:   Implant Name Type Inv. Item Serial No.  Lot No. LRB No. Used Action   SYSTEM IMPL INCL NDL 1.3MM WHT WHT/BLUE WHT/BLACK  SYSTEM IMPL INCL NDL 1.3MM WHT WHT/BLUE WHT/BLACK  [employer] [id number] Left 1 Implanted       INJECTABLES: 10 mL of 0.5% Marcaine plain    SPECIMEN:   * No specimens in log *    COMPLICATIONS: None    FINDINGS: Left Achilles tendon complete acute laceration at approximately watershed area without mopping of residual tendon proximal and distal portions, no damage to surrounding tissues vessels or nerves was noted    Indications for procedure:  Patient has had an acute Achilles tendon rupture while taking out garbage move. It was a complete laceration through the skin through the entire Achilles tendon because of the rupture. Patient understands the risk of surgery and the risk of conservative treatment and wishes to try to maintain full use of his left lower extremity. Patient wishes to move forward with surgery at this time. Risks and benefits discussed with patient no guarantees given or implied consent signed in chart. PROCEDURE IN DETAIL: The patient was transported from pre-op to the operating room and placed on the operating table in the prone position, once adequate anesthesia was given and intubation performed. A safety strap was placed across the lap.  A pneumatic thigh tourniquet was placed about the patient's left thigh. The lower extremity was then scrubbed, prepped and draped in the usual aseptic manner. The lower extremity was elevated and exsanguinated using an Esmarch bandage. The tourniquet was then inflated. Attention was then directed to the posterior heel where a linear transverse incision was present from the acute laceration. The proximal end of the achilles was retracted proximally and could not be found in the existing laceration. Therefore, medially the incision was extended proximally to reveal the proximal stump of the achilles. An Allis clamp was used and the Achilles was reapproximated while plantar flexing the left foot. After reapproximation the Ar[employer] PARS system was used according to 's instructions suture was passed percutaneously creating locking sutures and hand ties were used on the proximal and distal portions to reapproximate the Achilles tendon. Anatomic alignment was noted with the left foot and plantarflexed position and the distal and proximal ends were ideally reapproximated. The areas rinsed copious amounts normal saline solution and closure was performed with 2-0 Vicryl, 4-0 Vicryl, 4-0 Prolene on skin. The tourniquet was deflated and prompt capillary response was noted to the left lower extremity. Next a posterior splint was applied with care taken to keep the foot in gravity equinus. The patient tolerated the above procedure and anesthesia well without complications. The patient was transported from the operating room to the PACU with vital signs stable and vascular status intact to the right foot. The patient was counseled at length about the risks of cely Covid-19 during their perioperative period and any recovery window from their procedure. The patient was made aware that cely Covid-19  may worsen their prognosis for recovering from their procedure  and lend to a higher morbidity and/or mortality risk.   All material risks, benefits, and reasonable alternatives including postponing the procedure were discussed. The patient does wish to proceed with the procedure at this time.     Jennifer Long DPM   Podiatric Medicine & Surgery   10/8/2021 at 3:29 PM

## 2021-10-12 ENCOUNTER — OFFICE VISIT (OUTPATIENT)
Dept: PODIATRY | Age: 26
End: 2021-10-12
Payer: MEDICAID

## 2021-10-12 DIAGNOSIS — Z98.890 POST-OPERATIVE STATE: ICD-10-CM

## 2021-10-12 DIAGNOSIS — S86.022A LACERATION OF ACHILLES TENDON, LEFT, INITIAL ENCOUNTER: Primary | ICD-10-CM

## 2021-10-12 PROCEDURE — 29515 APPLICATION SHORT LEG SPLINT: CPT | Performed by: PODIATRIST

## 2021-10-12 PROCEDURE — 99024 POSTOP FOLLOW-UP VISIT: CPT | Performed by: PODIATRIST

## 2021-10-12 NOTE — PROGRESS NOTES
[address]  Post Op note  Chief Complaint   Patient presents with    Post-Op Check     post op initial left       Tavon Mercado is a 27y.o. year old male who is 4 day(s) post op from foot surgery. Type: achilles repair left. Vital signs are stable. Pain level is 4. Patient denies N/V/F/C. Patient has been compliant with postoperative instructions. Review of Systems    Vascular: DP and PT pulses palpable 2/4, Right Foot and 2/4 on the Left Foot. CFT <3 seconds, Right Foot and <3 seconds on the Left Foot. Edema is absent,  Right Foot and presenton the Left Foot. Neurological:   Sensation present  to light touch to level of digits, both feet. Musculoskeletal:   Muscle strength is 5/5 on the Right Foot and guarded/5 on the Left Foot. Structural deformities are absent on the Right Foot and absent on the Left Foot. Integument:  Warm, dry, supple both feet. Incisions are healing well. Wound dehiscence is absent. Infection is absent. Assesment : Post operative progress gradually improving. Diagnosis Orders   1. Laceration of Achilles tendon, left, initial encounter  NJ APPLY LOWER LEG SPLINT   2. Post-operative state  NJ APPLY LOWER LEG SPLINT         Plan: Sutures in place. Dry sterile dressing applied. Keep surgical site dry. Ice and elevation as directed. No orders of the defined types were placed in this encounter. no weight  Use crutches    Large bulky compressive wrap applied with 2 layers of cast padding, ACE wraps, followed by a posterior fiberglass splint secured with Coban. Follow up 2week(s).

## 2021-10-18 ENCOUNTER — TELEPHONE (OUTPATIENT)
Dept: PODIATRY | Age: 26
End: 2021-10-18

## 2021-10-18 DIAGNOSIS — S91.012D LACERATION OF LEFT ANKLE, SUBSEQUENT ENCOUNTER: ICD-10-CM

## 2021-10-18 DIAGNOSIS — S86.012A RUPTURE ACHILLES TENDON, LEFT, INITIAL ENCOUNTER: ICD-10-CM

## 2021-10-18 RX ORDER — OXYCODONE AND ACETAMINOPHEN 10; 325 MG/1; MG/1
1 TABLET ORAL EVERY 6 HOURS PRN
Qty: 28 TABLET | Refills: 0 | Status: SHIPPED | OUTPATIENT
Start: 2021-10-18 | End: 2021-10-19 | Stop reason: SDUPTHER

## 2021-10-19 ENCOUNTER — TELEPHONE (OUTPATIENT)
Dept: PODIATRY | Age: 26
End: 2021-10-19

## 2021-10-19 DIAGNOSIS — S91.012D LACERATION OF LEFT ANKLE, SUBSEQUENT ENCOUNTER: ICD-10-CM

## 2021-10-19 DIAGNOSIS — S86.012A RUPTURE ACHILLES TENDON, LEFT, INITIAL ENCOUNTER: ICD-10-CM

## 2021-10-19 RX ORDER — OXYCODONE AND ACETAMINOPHEN 10; 325 MG/1; MG/1
1 TABLET ORAL EVERY 6 HOURS PRN
Qty: 28 TABLET | Refills: 0 | Status: SHIPPED | OUTPATIENT
Start: 2021-10-19 | End: 2021-11-04 | Stop reason: SDUPTHER

## 2021-10-19 NOTE — TELEPHONE ENCOUNTER
Patient called saying that pharmacy does not cover prescription due to having insurance through [place]. Pharmacy told patient that you are not licensed to prescribe through their insurance. They would like prescription send to [facility] on [address].

## 2021-10-21 ENCOUNTER — OFFICE VISIT (OUTPATIENT)
Dept: PODIATRY | Age: 26
End: 2021-10-21
Payer: MEDICAID

## 2021-10-21 VITALS — WEIGHT: 220 LBS | BODY MASS INDEX: 29.8 KG/M2 | HEIGHT: 72 IN

## 2021-10-21 DIAGNOSIS — Z98.890 POST-OPERATIVE STATE: ICD-10-CM

## 2021-10-21 DIAGNOSIS — S91.012D LACERATION OF LEFT ANKLE, SUBSEQUENT ENCOUNTER: Primary | ICD-10-CM

## 2021-10-21 DIAGNOSIS — S86.012A RUPTURE ACHILLES TENDON, LEFT, INITIAL ENCOUNTER: ICD-10-CM

## 2021-10-21 DIAGNOSIS — S86.022A LACERATION OF ACHILLES TENDON, LEFT, INITIAL ENCOUNTER: ICD-10-CM

## 2021-10-21 PROCEDURE — L4360 PNEUMAT WALKING BOOT PRE CST: HCPCS | Performed by: PODIATRIST

## 2021-10-21 NOTE — PROGRESS NOTES
[address]  Post Op note  Chief Complaint   Patient presents with    Post-Op Check     post op left, patient fell about 5 days ago landing on foot        Theresa Cockayne is a 27y.o. year old male who is 2 weeks post op from foot surgery. Type: achilles repair left. Vital signs are stable. Pain level is 4. Patient denies N/V/F/C. Patient has been compliant with postoperative instructions. Review of Systems    Vascular: DP and PT pulses palpable 2/4, Right Foot and 2/4 on the Left Foot. CFT <3 seconds, Right Foot and <3 seconds on the Left Foot. Edema is absent,  Right Foot and presenton the Left Foot. Neurological:   Sensation present  to light touch to level of digits, both feet. Musculoskeletal:   Muscle strength is 5/5 on the Right Foot and guarded/5 on the Left Foot. Structural deformities are absent on the Right Foot and absent on the Left Foot. Integument:  Warm, dry, supple both feet. Incisions are healing well. Wound dehiscence is absent. Infection is absent. Assesment : Post operative progress gradually improving. Diagnosis Orders   1. Laceration of left ankle, subsequent encounter     2. Rupture Achilles tendon, left, initial encounter     3. Laceration of Achilles tendon, left, initial encounter     4. Post-operative state           Plan: Sutures  removed. Dry sterile dressing applied. Keep surgical site dry. Ice and elevation as directed. No orders of the defined types were placed in this encounter. no weight  Use crutches    I have dispensed a pneumatic equalizer walker. Due to the patient's diagnosis and related symptoms this is medically necessary for the treatment. The function of this device is to restrict and limit motion and provide stabilization and compression to the affected area. This device will allow the patient to slowly increase strength and ROM of the extremity.  Specific instructions on weight bearing and ROM exercises were discussed and given to the patient. The goals and function of this device was explained in detail to the patient. Upon gait analysis, the device appeared to be fitting well and the patient states that the device is comfortable at this time. The patient was shown how to properly apply, wear, and care for the device. The patient was able to apply properly and ambulate without distress. At that time, the device was  dispensed, it was suitable for the patient's condition and was not substandard. No guarantees were given and the precautions were reviewed. Written instructions and warranty information was given along with the list of the twenty-one (21) Durable Medical Equipment Supplier Guidelines. All the questions were answered satisfactorily      Follow up 2week(s).

## 2021-11-04 ENCOUNTER — OFFICE VISIT (OUTPATIENT)
Dept: PODIATRY | Age: 26
End: 2021-11-04

## 2021-11-04 VITALS — BODY MASS INDEX: 29.8 KG/M2 | HEIGHT: 72 IN | WEIGHT: 220 LBS

## 2021-11-04 DIAGNOSIS — S91.012D LACERATION OF LEFT ANKLE, SUBSEQUENT ENCOUNTER: Primary | ICD-10-CM

## 2021-11-04 DIAGNOSIS — S86.022A LACERATION OF ACHILLES TENDON, LEFT, INITIAL ENCOUNTER: ICD-10-CM

## 2021-11-04 DIAGNOSIS — S86.012A RUPTURE ACHILLES TENDON, LEFT, INITIAL ENCOUNTER: ICD-10-CM

## 2021-11-04 DIAGNOSIS — Z98.890 POST-OPERATIVE STATE: ICD-10-CM

## 2021-11-04 PROCEDURE — 99024 POSTOP FOLLOW-UP VISIT: CPT | Performed by: PODIATRIST

## 2021-11-04 RX ORDER — OXYCODONE AND ACETAMINOPHEN 10; 325 MG/1; MG/1
1 TABLET ORAL EVERY 6 HOURS PRN
Qty: 28 TABLET | Refills: 0 | Status: SHIPPED | OUTPATIENT
Start: 2021-11-04 | End: 2021-11-24 | Stop reason: SDUPTHER

## 2021-11-04 NOTE — PROGRESS NOTES
[address]  Post Op note  Chief Complaint   Patient presents with    Post-Op Check     post op check left       Jonn Pollack is a 27y.o. year old male who is 4 weeks post op from foot surgery. Type: achilles repair left. Vital signs are stable. Pain level is 4. Patient denies N/V/F/C. Patient has been somewhat compliant with postoperative instructions. He has been putting weight on his foot without the boot, some walking. Review of Systems    Vascular: DP and PT pulses palpable 2/4, Right Foot and 2/4 on the Left Foot. CFT <3 seconds, Right Foot and <3 seconds on the Left Foot. Edema is absent,  Right Foot and presenton the Left Foot. Neurological:   Sensation present  to light touch to level of digits, both feet. Musculoskeletal:   Muscle strength is 5/5 on the Right Foot and 4/5 on the Left Foot. Structural deformities are absent on the Right Foot and absent on the Left Foot. Achilles feels intact left foot, minimal pain to palpation. Integument:  Warm, dry, supple both feet. Incisions are healing well. Wound dehiscence is absent. Infection is absent. Assesment : Post operative progress gradually improving. Diagnosis Orders   1. Laceration of left ankle, subsequent encounter  oxyCODONE-acetaminophen (PERCOCET)  MG per tablet   2. Rupture Achilles tendon, left, initial encounter  oxyCODONE-acetaminophen (PERCOCET)  MG per tablet   3. Laceration of Achilles tendon, left, initial encounter     4. Post-operative state           Plan: Pt was evaluated and examined. Patient was given personalized discharge instructions. Pt will follow up in 4 weeks or sooner if any problems arise. Diagnosis was discussed with the pt and all of their questions were answered in detail. Proper foot hygiene and care was discussed with the pt. Patient to check feet daily and contact the office with any questions/problems/concerns.   Other comorbidity noted and will be managed by PCP. Orders Placed This Encounter   Medications    oxyCODONE-acetaminophen (PERCOCET)  MG per tablet     Sig: Take 1 tablet by mouth every 6 hours as needed for Pain for up to 30 days. Intended supply: 30 days     Dispense:  28 tablet     Refill:  0     Reduce doses taken as pain becomes manageable   Discussed the risk of poor healing if weight bearing to much, especially without the protection of the CAM walker    Start gradual weight bearing with CAM walker  HEP, light ROM      Follow up 4 week(s).

## 2021-11-22 ENCOUNTER — TELEPHONE (OUTPATIENT)
Dept: PODIATRY | Age: 26
End: 2021-11-22

## 2021-11-22 DIAGNOSIS — S86.012A RUPTURE ACHILLES TENDON, LEFT, INITIAL ENCOUNTER: ICD-10-CM

## 2021-11-22 DIAGNOSIS — S91.012D LACERATION OF LEFT ANKLE, SUBSEQUENT ENCOUNTER: ICD-10-CM

## 2021-11-24 RX ORDER — OXYCODONE AND ACETAMINOPHEN 10; 325 MG/1; MG/1
1 TABLET ORAL EVERY 6 HOURS PRN
Qty: 20 TABLET | Refills: 0 | Status: SHIPPED | OUTPATIENT
Start: 2021-11-24 | End: 2021-12-20 | Stop reason: SDUPTHER

## 2021-12-17 ENCOUNTER — TELEPHONE (OUTPATIENT)
Dept: PODIATRY | Age: 26
End: 2021-12-17

## 2021-12-17 DIAGNOSIS — S91.012D LACERATION OF LEFT ANKLE, SUBSEQUENT ENCOUNTER: Primary | ICD-10-CM

## 2021-12-17 DIAGNOSIS — Z98.890 POST-OPERATIVE STATE: ICD-10-CM

## 2021-12-17 DIAGNOSIS — S86.012A RUPTURE ACHILLES TENDON, LEFT, INITIAL ENCOUNTER: ICD-10-CM

## 2021-12-17 DIAGNOSIS — S86.022A LACERATION OF ACHILLES TENDON, LEFT, INITIAL ENCOUNTER: ICD-10-CM

## 2021-12-17 NOTE — TELEPHONE ENCOUNTER
Patient missed last appt and asked to get an appt with you. Your schedule for next week is full. Would you like me to put him on? He said it's swelling the size of a golf ball.  Also requested a refill for percocet to [facility] on [address]

## 2021-12-20 RX ORDER — OXYCODONE AND ACETAMINOPHEN 10; 325 MG/1; MG/1
1 TABLET ORAL EVERY 6 HOURS PRN
Qty: 20 TABLET | Refills: 0 | Status: SHIPPED | OUTPATIENT
Start: 2021-12-20 | End: 2022-02-10 | Stop reason: SDUPTHER

## 2021-12-21 DIAGNOSIS — S86.022A LACERATION OF ACHILLES TENDON, LEFT, INITIAL ENCOUNTER: Primary | ICD-10-CM

## 2021-12-21 DIAGNOSIS — S86.012A RUPTURE ACHILLES TENDON, LEFT, INITIAL ENCOUNTER: ICD-10-CM

## 2022-02-02 ENCOUNTER — OFFICE VISIT (OUTPATIENT)
Dept: PODIATRY | Age: 27
End: 2022-02-02
Payer: MEDICAID

## 2022-02-02 VITALS — HEIGHT: 72 IN | BODY MASS INDEX: 29.8 KG/M2 | WEIGHT: 220 LBS

## 2022-02-02 DIAGNOSIS — S91.012D LACERATION OF LEFT ANKLE, SUBSEQUENT ENCOUNTER: ICD-10-CM

## 2022-02-02 DIAGNOSIS — S86.012A RUPTURE ACHILLES TENDON, LEFT, INITIAL ENCOUNTER: ICD-10-CM

## 2022-02-02 DIAGNOSIS — S86.022A LACERATION OF ACHILLES TENDON, LEFT, INITIAL ENCOUNTER: Primary | ICD-10-CM

## 2022-02-02 PROCEDURE — 1036F TOBACCO NON-USER: CPT | Performed by: PODIATRIST

## 2022-02-02 PROCEDURE — G8427 DOCREV CUR MEDS BY ELIG CLIN: HCPCS | Performed by: PODIATRIST

## 2022-02-02 PROCEDURE — G8484 FLU IMMUNIZE NO ADMIN: HCPCS | Performed by: PODIATRIST

## 2022-02-02 PROCEDURE — G8419 CALC BMI OUT NRM PARAM NOF/U: HCPCS | Performed by: PODIATRIST

## 2022-02-02 PROCEDURE — 99213 OFFICE O/P EST LOW 20 MIN: CPT | Performed by: PODIATRIST

## 2022-02-02 NOTE — PROGRESS NOTES
[address]  Post Op note  Chief Complaint   Patient presents with    Follow-up     left, doing good       Wade Patel is a 27y.o. year old male who is 4 months post op from foot surgery. Type: achilles repair left. Vital signs are stable. Pain level is 0-2 Patient denies N/V/F/C. Patient has been somewhat compliant with postoperative instructions. He has been walking in a regular shoe for about a month. Did not feel he needed the PT I ordered. He is gaining strength. Review of Systems    Vascular: DP and PT pulses palpable 2/4, Right Foot and 2/4 on the Left Foot. CFT <3 seconds, Right Foot and <3 seconds on the Left Foot. Edema is absent,  Right Foot and absent on the Left Foot. Neurological:   Sensation present  to light touch to level of digits, both feet. Musculoskeletal:   Muscle strength is 5/5 on the Right Foot and 4/5 on the Left Foot. Structural deformities are absent on the Right Foot and absent on the Left Foot. Achilles feels intact left foot, minimal pain to palpation. Integument:  Warm, dry, supple both feet. Incisions are healing well. Wound dehiscence is absent. Infection is absent. Assesment : Post operative progress gradually improving. Diagnosis Orders   1. Laceration of Achilles tendon, left, initial encounter     2. Rupture Achilles tendon, left, initial encounter     3. Laceration of left ankle, subsequent encounter           Plan: Pt was evaluated and examined. Patient was given personalized discharge instructions. Pt will follow up in as needed or sooner if any problems arise. Diagnosis was discussed with the pt and all of their questions were answered in detail. Proper foot hygiene and care was discussed with the pt. Patient to check feet daily and contact the office with any questions/problems/concerns. Other comorbidity noted and will be managed by PCP.     No orders of the defined types were placed in this encounter.     HEP,  Info given        Follow up as needed

## 2022-02-09 ENCOUNTER — TELEPHONE (OUTPATIENT)
Dept: PODIATRY | Age: 27
End: 2022-02-09

## 2022-02-09 DIAGNOSIS — S91.012D LACERATION OF LEFT ANKLE, SUBSEQUENT ENCOUNTER: ICD-10-CM

## 2022-02-09 DIAGNOSIS — S86.022A LACERATION OF ACHILLES TENDON, LEFT, INITIAL ENCOUNTER: Primary | ICD-10-CM

## 2022-02-09 DIAGNOSIS — S86.012A RUPTURE ACHILLES TENDON, LEFT, INITIAL ENCOUNTER: ICD-10-CM

## 2022-02-09 DIAGNOSIS — Z98.890 POST-OPERATIVE STATE: ICD-10-CM

## 2022-02-09 NOTE — TELEPHONE ENCOUNTER
Patient called saying that his prescription for Percocet was not called in to his pharmacy after his last appointment on 2/2/2022.

## 2022-02-10 RX ORDER — OXYCODONE AND ACETAMINOPHEN 10; 325 MG/1; MG/1
1 TABLET ORAL EVERY 6 HOURS PRN
Qty: 15 TABLET | Refills: 0 | Status: SHIPPED | OUTPATIENT
Start: 2022-02-10 | End: 2022-02-24

## 2022-04-11 ENCOUNTER — APPOINTMENT (OUTPATIENT)
Dept: CT IMAGING | Age: 27
DRG: 005 | End: 2022-04-11
Payer: MEDICAID

## 2022-04-11 ENCOUNTER — HOSPITAL ENCOUNTER (INPATIENT)
Age: 27
LOS: 29 days | Discharge: NURSING, REHAB OR LONG-TERM CARE | DRG: 005 | End: 2022-05-11
Attending: EMERGENCY MEDICINE | Admitting: SURGERY
Payer: MEDICAID
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
## 2022-04-11 DIAGNOSIS — I60.9 SAH (SUBARACHNOID HEMORRHAGE) (HCC): ICD-10-CM

## 2022-04-11 DIAGNOSIS — S02.113A: ICD-10-CM
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
## 2022-04-11 PROCEDURE — 72125 CT NECK SPINE W/O DYE: CPT

## 2022-04-11 PROCEDURE — 70450 CT HEAD/BRAIN W/O DYE: CPT

## 2022-04-11 PROCEDURE — 87635 SARS-COV-2 COVID-19 AMP PRB: CPT

## 2022-04-11 PROCEDURE — 3209999900 CT THORACIC SPINE TRAUMA RECONSTRUCTION

## 2022-04-11 PROCEDURE — 6810039000 HC L1 TRAUMA ALERT

## 2022-04-11 PROCEDURE — 2500000003 HC RX 250 WO HCPCS

## 2022-04-11 PROCEDURE — 99285 EMERGENCY DEPT VISIT HI MDM: CPT

## 2022-04-11 PROCEDURE — 71260 CT THORAX DX C+: CPT

## 2022-04-11 PROCEDURE — 3209999900 CT LUMBAR SPINE TRAUMA RECONSTRUCTION

## 2022-04-11 PROCEDURE — 2700000000 HC OXYGEN THERAPY PER DAY

## 2022-04-11 RX ORDER — PROPOFOL 10 MG/ML
5-50 INJECTION, EMULSION INTRAVENOUS CONTINUOUS
Status: DISCONTINUED | OUTPATIENT
Start: 2022-04-11 | End: 2022-04-12

## 2022-04-11 RX ORDER — FENTANYL CITRATE 50 UG/ML
INJECTION, SOLUTION INTRAMUSCULAR; INTRAVENOUS
Status: COMPLETED
Start: 2022-04-11 | End: 2022-04-12

## 2022-04-11 RX ORDER — PROPOFOL 10 MG/ML
INJECTION, EMULSION INTRAVENOUS
Status: COMPLETED
Start: 2022-04-11 | End: 2022-04-12

## 2022-04-11 ASSESSMENT — PULMONARY FUNCTION TESTS: PIF_VALUE: 22

## 2022-04-12 ENCOUNTER — APPOINTMENT (OUTPATIENT)
Dept: GENERAL RADIOLOGY | Age: 27
DRG: 005 | End: 2022-04-12
Payer: MEDICAID

## 2022-04-12 ENCOUNTER — APPOINTMENT (OUTPATIENT)
Dept: CT IMAGING | Age: 27
DRG: 005 | End: 2022-04-12
Payer: MEDICAID

## 2022-04-12 PROBLEM — I60.9 SAH (SUBARACHNOID HEMORRHAGE) (HCC): Status: ACTIVE | Noted: 2022-04-12

## 2022-04-12 PROBLEM — S12.000S: Status: ACTIVE | Noted: 2022-04-12

## 2022-04-12 LAB
-: NORMAL
ABO/RH: NORMAL
ABSOLUTE EOS #: 0 K/UL (ref 0–0.4)
ABSOLUTE EOS #: 0.07 K/UL (ref 0–0.44)
ABSOLUTE IMMATURE GRANULOCYTE: 0 K/UL (ref 0–0.3)
ABSOLUTE IMMATURE GRANULOCYTE: 0.05 K/UL (ref 0–0.3)
ABSOLUTE LYMPH #: 0.43 K/UL (ref 1–4.8)
ABSOLUTE LYMPH #: 1.74 K/UL (ref 1.1–3.7)
ABSOLUTE MONO #: 0.14 K/UL (ref 0.1–0.8)
ABSOLUTE MONO #: 0.72 K/UL (ref 0.1–1.2)
ALLEN TEST: POSITIVE
AMMONIA: 29 UMOL/L (ref 16–60)
AMPHETAMINE SCREEN URINE: NEGATIVE
ANION GAP SERPL CALCULATED.3IONS-SCNC: 14 MMOL/L (ref 9–17)
ANION GAP SERPL CALCULATED.3IONS-SCNC: 14 MMOL/L (ref 9–17)
ANION GAP SERPL CALCULATED.3IONS-SCNC: 8 MMOL/L (ref 9–17)
ANTIBODY SCREEN: NEGATIVE
ARM BAND NUMBER: NORMAL
BARBITURATE SCREEN URINE: NEGATIVE
BASOPHILS # BLD: 0 % (ref 0–2)
BASOPHILS # BLD: 0 % (ref 0–2)
BASOPHILS ABSOLUTE: 0 K/UL (ref 0–0.2)
BASOPHILS ABSOLUTE: <0.03 K/UL (ref 0–0.2)
BENZODIAZEPINE SCREEN, URINE: NEGATIVE
BILIRUBIN URINE: NEGATIVE
BLOOD BANK SPECIMEN: ABNORMAL
BUN BLDV-MCNC: 11 MG/DL (ref 6–20)
BUN BLDV-MCNC: 9 MG/DL (ref 6–20)
BUN BLDV-MCNC: 9 MG/DL (ref 6–20)
CALCIUM IONIZED: 1.14 MMOL/L (ref 1.13–1.33)
CALCIUM SERPL-MCNC: 8.3 MG/DL (ref 8.6–10.4)
CALCIUM SERPL-MCNC: 8.4 MG/DL (ref 8.6–10.4)
CANNABINOID SCREEN URINE: POSITIVE
CARBOXYHEMOGLOBIN: 3.1 % (ref 0–5)
CASTS UA: NORMAL /LPF (ref 0–8)
CHLORIDE BLD-SCNC: 103 MMOL/L (ref 98–107)
CHLORIDE BLD-SCNC: 106 MMOL/L (ref 98–107)
CHLORIDE BLD-SCNC: 107 MMOL/L (ref 98–107)
CO2: 21 MMOL/L (ref 20–31)
CO2: 21 MMOL/L (ref 20–31)
CO2: 24 MMOL/L (ref 20–31)
COCAINE METABOLITE, URINE: NEGATIVE
COLOR: YELLOW
CREAT SERPL-MCNC: 0.71 MG/DL (ref 0.7–1.2)
CREAT SERPL-MCNC: 0.74 MG/DL (ref 0.7–1.2)
CREAT SERPL-MCNC: 0.92 MG/DL (ref 0.7–1.2)
EOSINOPHILS RELATIVE PERCENT: 0 % (ref 1–4)
EOSINOPHILS RELATIVE PERCENT: 1 % (ref 1–4)
EPITHELIAL CELLS UA: NORMAL /HPF (ref 0–5)
ETHANOL PERCENT: 0.23 %
ETHANOL: 225 MG/DL
EXPIRATION DATE: NORMAL
FIO2: 100
FIO2: 30
FIO2: 50
FIO2: ABNORMAL
GFR AFRICAN AMERICAN: >60 ML/MIN
GFR AFRICAN AMERICAN: >60 ML/MIN
GFR NON-AFRICAN AMERICAN: >60 ML/MIN
GFR NON-AFRICAN AMERICAN: >60 ML/MIN
GFR SERPL CREATININE-BSD FRML MDRD: ABNORMAL ML/MIN/{1.73_M2}
GFR SERPL CREATININE-BSD FRML MDRD: ABNORMAL ML/MIN/{1.73_M2}
GLUCOSE BLD-MCNC: 138 MG/DL (ref 70–99)
GLUCOSE BLD-MCNC: 89 MG/DL (ref 74–100)
GLUCOSE BLD-MCNC: 90 MG/DL (ref 74–100)
GLUCOSE BLD-MCNC: 93 MG/DL (ref 70–99)
GLUCOSE BLD-MCNC: 97 MG/DL (ref 70–99)
GLUCOSE URINE: NEGATIVE
HCG QUALITATIVE: ABNORMAL
HCO3 VENOUS: 20.7 MMOL/L (ref 24–30)
HCT VFR BLD CALC: 33.4 % (ref 40.7–50.3)
HCT VFR BLD CALC: 38.9 % (ref 40.7–50.3)
HCT VFR BLD CALC: 40.6 % (ref 40.7–50.3)
HEMOGLOBIN: 12.5 G/DL (ref 13–17)
HEMOGLOBIN: 13.5 G/DL (ref 13–17)
HEMOGLOBIN: 14.3 G/DL (ref 13–17)
IMMATURE GRANULOCYTES: 0 %
IMMATURE GRANULOCYTES: 0 %
INR BLD: 1
KETONES, URINE: ABNORMAL
LEUKOCYTE ESTERASE, URINE: NEGATIVE
LYMPHOCYTES # BLD: 16 % (ref 24–43)
LYMPHOCYTES # BLD: 6 % (ref 24–44)
MAGNESIUM: 1.8 MG/DL (ref 1.6–2.6)
MCH RBC QN AUTO: 33.2 PG (ref 25.2–33.5)
MCH RBC QN AUTO: 33.6 PG (ref 25.2–33.5)
MCH RBC QN AUTO: 33.9 PG (ref 25.2–33.5)
MCHC RBC AUTO-ENTMCNC: 34.7 G/DL (ref 28.4–34.8)
MCHC RBC AUTO-ENTMCNC: 35.2 G/DL (ref 28.4–34.8)
MCHC RBC AUTO-ENTMCNC: 37.4 G/DL (ref 28.4–34.8)
MCV RBC AUTO: 90.5 FL (ref 82.6–102.9)
MCV RBC AUTO: 95.3 FL (ref 82.6–102.9)
MCV RBC AUTO: 95.6 FL (ref 82.6–102.9)
METHADONE SCREEN, URINE: NEGATIVE
MODE: ABNORMAL
MONOCYTES # BLD: 2 % (ref 1–7)
MONOCYTES # BLD: 6 % (ref 3–12)
MORPHOLOGY: NORMAL
NEGATIVE BASE EXCESS, ART: 2 (ref 0–2)
NEGATIVE BASE EXCESS, ART: 3 (ref 0–2)
NEGATIVE BASE EXCESS, VEN: 4.1 MMOL/L (ref 0–2)
NITRITE, URINE: NEGATIVE
NRBC AUTOMATED: 0 PER 100 WBC
O2 DEVICE/FLOW/%: ABNORMAL
O2 SAT, VEN: 78.2 % (ref 60–85)
OPIATES, URINE: NEGATIVE
OXYCODONE SCREEN URINE: NEGATIVE
PARTIAL THROMBOPLASTIN TIME: 21.1 SEC (ref 20.5–30.5)
PATIENT TEMP: 36.5
PATIENT TEMP: 37
PCO2, VEN: 39.2 (ref 39–55)
PDW BLD-RTO: 12.2 % (ref 11.8–14.4)
PDW BLD-RTO: 12.3 % (ref 11.8–14.4)
PDW BLD-RTO: 12.3 % (ref 11.8–14.4)
PH UA: 5.5 (ref 5–8)
PH VENOUS: 7.34 (ref 7.32–7.42)
PHENCYCLIDINE, URINE: NEGATIVE
PHOSPHORUS: 3.4 MG/DL (ref 2.5–4.5)
PLATELET # BLD: 153 K/UL (ref 138–453)
PLATELET # BLD: 215 K/UL (ref 138–453)
PLATELET # BLD: 230 K/UL (ref 138–453)
PMV BLD AUTO: 9.7 FL (ref 8.1–13.5)
PMV BLD AUTO: 9.8 FL (ref 8.1–13.5)
PMV BLD AUTO: 9.9 FL (ref 8.1–13.5)
PO2, VEN: 49.5 (ref 30–50)
POC HCO3: 23 MMOL/L (ref 21–28)
POC HCO3: 24.3 MMOL/L (ref 21–28)
POC HCO3: 24.7 MMOL/L (ref 21–28)
POC LACTIC ACID: 0.71 MMOL/L (ref 0.56–1.39)
POC LACTIC ACID: 1.98 MMOL/L (ref 0.56–1.39)
POC LACTIC ACID: 2.63 MMOL/L (ref 0.56–1.39)
POC O2 SATURATION: 100 % (ref 94–98)
POC O2 SATURATION: 100 % (ref 94–98)
POC O2 SATURATION: 99 % (ref 94–98)
POC PCO2: 37.8 MM HG (ref 35–48)
POC PCO2: 44.4 MM HG (ref 35–48)
POC PCO2: 46.3 MM HG (ref 35–48)
POC PH: 7.32 (ref 7.35–7.45)
POC PH: 7.33 (ref 7.35–7.45)
POC PH: 7.42 (ref 7.35–7.45)
POC PO2: 114.8 MM HG (ref 83–108)
POC PO2: 200.6 MM HG (ref 83–108)
POC PO2: 596 MM HG (ref 83–108)
POSITIVE BASE EXCESS, ART: 0 (ref 0–3)
POTASSIUM SERPL-SCNC: 3.8 MMOL/L (ref 3.7–5.3)
POTASSIUM SERPL-SCNC: 3.9 MMOL/L (ref 3.7–5.3)
POTASSIUM SERPL-SCNC: 3.9 MMOL/L (ref 3.7–5.3)
PROTEIN UA: NEGATIVE
PROTHROMBIN TIME: 10.3 SEC (ref 9.1–12.3)
RBC # BLD: 3.69 M/UL (ref 4.21–5.77)
RBC # BLD: 4.07 M/UL (ref 4.21–5.77)
RBC # BLD: 4.26 M/UL (ref 4.21–5.77)
RBC UA: NORMAL /HPF (ref 0–4)
SAMPLE SITE: ABNORMAL
SARS-COV-2, RAPID: NOT DETECTED
SEG NEUTROPHILS: 77 % (ref 36–65)
SEG NEUTROPHILS: 92 % (ref 36–66)
SEGMENTED NEUTROPHILS ABSOLUTE COUNT: 6.53 K/UL (ref 1.8–7.7)
SEGMENTED NEUTROPHILS ABSOLUTE COUNT: 8.57 K/UL (ref 1.5–8.1)
SODIUM BLD-SCNC: 138 MMOL/L (ref 135–144)
SODIUM BLD-SCNC: 139 MMOL/L (ref 135–144)
SODIUM BLD-SCNC: 141 MMOL/L (ref 135–144)
SPECIFIC GRAVITY UA: 1.03 (ref 1–1.03)
SPECIMEN DESCRIPTION: NORMAL
TEST INFORMATION: ABNORMAL
TURBIDITY: ABNORMAL
URINE HGB: NEGATIVE
UROBILINOGEN, URINE: NORMAL
WBC # BLD: 11.2 K/UL (ref 3.5–11.3)
WBC # BLD: 11.4 K/UL (ref 3.5–11.3)
WBC # BLD: 7.1 K/UL (ref 3.5–11.3)
WBC UA: NORMAL /HPF (ref 0–5)

## 2022-04-12 PROCEDURE — 82803 BLOOD GASES ANY COMBINATION: CPT

## 2022-04-12 PROCEDURE — 2500000003 HC RX 250 WO HCPCS: Performed by: STUDENT IN AN ORGANIZED HEALTH CARE EDUCATION/TRAINING PROGRAM

## 2022-04-12 PROCEDURE — 6360000004 HC RX CONTRAST MEDICATION: Performed by: EMERGENCY MEDICINE

## 2022-04-12 PROCEDURE — 2500000003 HC RX 250 WO HCPCS: Performed by: EMERGENCY MEDICINE

## 2022-04-12 PROCEDURE — 36600 WITHDRAWAL OF ARTERIAL BLOOD: CPT

## 2022-04-12 PROCEDURE — 82140 ASSAY OF AMMONIA: CPT

## 2022-04-12 PROCEDURE — 6370000000 HC RX 637 (ALT 250 FOR IP): Performed by: EMERGENCY MEDICINE

## 2022-04-12 PROCEDURE — 71045 X-RAY EXAM CHEST 1 VIEW: CPT

## 2022-04-12 PROCEDURE — 83605 ASSAY OF LACTIC ACID: CPT

## 2022-04-12 PROCEDURE — 6360000002 HC RX W HCPCS: Performed by: EMERGENCY MEDICINE

## 2022-04-12 PROCEDURE — 5A1945Z RESPIRATORY VENTILATION, 24-96 CONSECUTIVE HOURS: ICD-10-PCS | Performed by: SURGERY

## 2022-04-12 PROCEDURE — 84100 ASSAY OF PHOSPHORUS: CPT

## 2022-04-12 PROCEDURE — 80048 BASIC METABOLIC PNL TOTAL CA: CPT

## 2022-04-12 PROCEDURE — 81001 URINALYSIS AUTO W/SCOPE: CPT

## 2022-04-12 PROCEDURE — 6360000002 HC RX W HCPCS

## 2022-04-12 PROCEDURE — 6360000002 HC RX W HCPCS: Performed by: STUDENT IN AN ORGANIZED HEALTH CARE EDUCATION/TRAINING PROGRAM

## 2022-04-12 PROCEDURE — 87641 MR-STAPH DNA AMP PROBE: CPT

## 2022-04-12 PROCEDURE — 94761 N-INVAS EAR/PLS OXIMETRY MLT: CPT

## 2022-04-12 PROCEDURE — 94002 VENT MGMT INPAT INIT DAY: CPT

## 2022-04-12 PROCEDURE — 2580000003 HC RX 258: Performed by: STUDENT IN AN ORGANIZED HEALTH CARE EDUCATION/TRAINING PROGRAM

## 2022-04-12 PROCEDURE — 2700000000 HC OXYGEN THERAPY PER DAY

## 2022-04-12 PROCEDURE — 80307 DRUG TEST PRSMV CHEM ANLYZR: CPT

## 2022-04-12 PROCEDURE — 85025 COMPLETE CBC W/AUTO DIFF WBC: CPT

## 2022-04-12 PROCEDURE — 6360000002 HC RX W HCPCS: Performed by: SURGERY

## 2022-04-12 PROCEDURE — 0BH18EZ INSERTION OF ENDOTRACHEAL AIRWAY INTO TRACHEA, VIA NATURAL OR ARTIFICIAL OPENING ENDOSCOPIC: ICD-10-PCS | Performed by: EMERGENCY MEDICINE

## 2022-04-12 PROCEDURE — 6370000000 HC RX 637 (ALT 250 FOR IP): Performed by: STUDENT IN AN ORGANIZED HEALTH CARE EDUCATION/TRAINING PROGRAM

## 2022-04-12 PROCEDURE — 2000000000 HC ICU R&B

## 2022-04-12 PROCEDURE — 2580000003 HC RX 258: Performed by: EMERGENCY MEDICINE

## 2022-04-12 PROCEDURE — 93005 ELECTROCARDIOGRAM TRACING: CPT | Performed by: SURGERY

## 2022-04-12 PROCEDURE — 74018 RADEX ABDOMEN 1 VIEW: CPT

## 2022-04-12 PROCEDURE — 36415 COLL VENOUS BLD VENIPUNCTURE: CPT

## 2022-04-12 PROCEDURE — 82330 ASSAY OF CALCIUM: CPT

## 2022-04-12 PROCEDURE — 83735 ASSAY OF MAGNESIUM: CPT

## 2022-04-12 PROCEDURE — 82947 ASSAY GLUCOSE BLOOD QUANT: CPT

## 2022-04-12 PROCEDURE — 0HQ3XZZ REPAIR LEFT EAR SKIN, EXTERNAL APPROACH: ICD-10-PCS | Performed by: SURGERY

## 2022-04-12 PROCEDURE — 0HQ1XZZ REPAIR FACE SKIN, EXTERNAL APPROACH: ICD-10-PCS | Performed by: SURGERY

## 2022-04-12 PROCEDURE — 70496 CT ANGIOGRAPHY HEAD: CPT

## 2022-04-12 PROCEDURE — 70450 CT HEAD/BRAIN W/O DYE: CPT

## 2022-04-12 PROCEDURE — 94003 VENT MGMT INPAT SUBQ DAY: CPT

## 2022-04-12 PROCEDURE — APPNB15 APP NON BILLABLE TIME 0-15 MINS: Performed by: NURSE PRACTITIONER

## 2022-04-12 RX ORDER — GINSENG 100 MG
CAPSULE ORAL 3 TIMES DAILY
Status: DISCONTINUED | OUTPATIENT
Start: 2022-04-12 | End: 2022-04-12

## 2022-04-12 RX ORDER — GABAPENTIN 600 MG/1
300 TABLET ORAL EVERY 8 HOURS
Status: DISCONTINUED | OUTPATIENT
Start: 2022-04-12 | End: 2022-04-16

## 2022-04-12 RX ORDER — LANOLIN ALCOHOL/MO/W.PET/CERES
500 CREAM (GRAM) TOPICAL DAILY
Status: DISCONTINUED | OUTPATIENT
Start: 2022-04-13 | End: 2022-04-12

## 2022-04-12 RX ORDER — ONDANSETRON 2 MG/ML
4 INJECTION INTRAMUSCULAR; INTRAVENOUS EVERY 6 HOURS PRN
Status: DISCONTINUED | OUTPATIENT
Start: 2022-04-12 | End: 2022-05-12 | Stop reason: HOSPADM

## 2022-04-12 RX ORDER — SODIUM CHLORIDE 0.9 % (FLUSH) 0.9 %
5-40 SYRINGE (ML) INJECTION EVERY 12 HOURS SCHEDULED
Status: DISCONTINUED | OUTPATIENT
Start: 2022-04-12 | End: 2022-05-12 | Stop reason: HOSPADM

## 2022-04-12 RX ORDER — SODIUM CHLORIDE 0.9 % (FLUSH) 0.9 %
5-40 SYRINGE (ML) INJECTION EVERY 12 HOURS SCHEDULED
Status: DISCONTINUED | OUTPATIENT
Start: 2022-04-12 | End: 2022-04-21

## 2022-04-12 RX ORDER — FOLIC ACID 5 MG/ML
1 INJECTION, SOLUTION INTRAMUSCULAR; INTRAVENOUS; SUBCUTANEOUS DAILY
Status: DISCONTINUED | OUTPATIENT
Start: 2022-04-12 | End: 2022-04-12

## 2022-04-12 RX ORDER — METOCLOPRAMIDE 10 MG/1
10 TABLET ORAL EVERY 6 HOURS
Status: DISCONTINUED | OUTPATIENT
Start: 2022-04-12 | End: 2022-04-18

## 2022-04-12 RX ORDER — QUETIAPINE FUMARATE 25 MG/1
50 TABLET, FILM COATED ORAL 2 TIMES DAILY
Status: DISCONTINUED | OUTPATIENT
Start: 2022-04-12 | End: 2022-04-13

## 2022-04-12 RX ORDER — PROPOFOL 10 MG/ML
5-50 INJECTION, EMULSION INTRAVENOUS CONTINUOUS
Status: DISCONTINUED | OUTPATIENT
Start: 2022-04-12 | End: 2022-04-20

## 2022-04-12 RX ORDER — ERYTHROMYCIN 5 MG/G
OINTMENT OPHTHALMIC NIGHTLY
Status: DISCONTINUED | OUTPATIENT
Start: 2022-04-12 | End: 2022-05-09

## 2022-04-12 RX ORDER — DEXMEDETOMIDINE HYDROCHLORIDE 4 UG/ML
.1-1.5 INJECTION, SOLUTION INTRAVENOUS CONTINUOUS
Status: DISCONTINUED | OUTPATIENT
Start: 2022-04-12 | End: 2022-04-13

## 2022-04-12 RX ORDER — LANOLIN ALCOHOL/MO/W.PET/CERES
100 CREAM (GRAM) TOPICAL DAILY
Status: DISCONTINUED | OUTPATIENT
Start: 2022-04-12 | End: 2022-04-12

## 2022-04-12 RX ORDER — SODIUM CHLORIDE 0.9 % (FLUSH) 0.9 %
5-40 SYRINGE (ML) INJECTION PRN
Status: DISCONTINUED | OUTPATIENT
Start: 2022-04-12 | End: 2022-05-12 | Stop reason: HOSPADM

## 2022-04-12 RX ORDER — LIDOCAINE HYDROCHLORIDE 10 MG/ML
10 INJECTION, SOLUTION INFILTRATION; PERINEURAL ONCE
Status: DISCONTINUED | OUTPATIENT
Start: 2022-04-12 | End: 2022-04-27

## 2022-04-12 RX ORDER — FENTANYL CITRATE 50 UG/ML
100 INJECTION, SOLUTION INTRAMUSCULAR; INTRAVENOUS ONCE
Status: COMPLETED | OUTPATIENT
Start: 2022-04-12 | End: 2022-04-12

## 2022-04-12 RX ORDER — SODIUM CHLORIDE 9 MG/ML
INJECTION, SOLUTION INTRAVENOUS PRN
Status: DISCONTINUED | OUTPATIENT
Start: 2022-04-12 | End: 2022-04-21

## 2022-04-12 RX ORDER — IBUPROFEN 400 MG/1
400 TABLET ORAL EVERY 4 HOURS
Status: DISCONTINUED | OUTPATIENT
Start: 2022-04-12 | End: 2022-04-12

## 2022-04-12 RX ORDER — OXYCODONE HYDROCHLORIDE 5 MG/1
5 TABLET ORAL EVERY 6 HOURS PRN
Status: DISCONTINUED | OUTPATIENT
Start: 2022-04-12 | End: 2022-04-14

## 2022-04-12 RX ORDER — GINSENG 100 MG
CAPSULE ORAL 3 TIMES DAILY
Status: DISCONTINUED | OUTPATIENT
Start: 2022-04-12 | End: 2022-04-27

## 2022-04-12 RX ORDER — LANOLIN ALCOHOL/MO/W.PET/CERES
500 CREAM (GRAM) TOPICAL DAILY
Status: COMPLETED | OUTPATIENT
Start: 2022-04-12 | End: 2022-04-14

## 2022-04-12 RX ORDER — MAGNESIUM SULFATE IN WATER 40 MG/ML
2000 INJECTION, SOLUTION INTRAVENOUS ONCE
Status: COMPLETED | OUTPATIENT
Start: 2022-04-12 | End: 2022-04-13

## 2022-04-12 RX ORDER — METHOCARBAMOL 750 MG/1
750 TABLET, FILM COATED ORAL EVERY 6 HOURS
Status: DISCONTINUED | OUTPATIENT
Start: 2022-04-12 | End: 2022-04-12

## 2022-04-12 RX ORDER — DIAZEPAM 5 MG/1
10 TABLET ORAL EVERY 6 HOURS
Status: DISCONTINUED | OUTPATIENT
Start: 2022-04-12 | End: 2022-04-17

## 2022-04-12 RX ORDER — PROPOFOL 10 MG/ML
INJECTION, EMULSION INTRAVENOUS
Status: DISPENSED
Start: 2022-04-12 | End: 2022-04-12

## 2022-04-12 RX ORDER — SENNA AND DOCUSATE SODIUM 50; 8.6 MG/1; MG/1
1 TABLET, FILM COATED ORAL 2 TIMES DAILY
Status: DISCONTINUED | OUTPATIENT
Start: 2022-04-12 | End: 2022-05-12 | Stop reason: HOSPADM

## 2022-04-12 RX ORDER — ACETAMINOPHEN 500 MG
1000 TABLET ORAL EVERY 8 HOURS SCHEDULED
Status: DISCONTINUED | OUTPATIENT
Start: 2022-04-12 | End: 2022-05-12 | Stop reason: HOSPADM

## 2022-04-12 RX ORDER — SODIUM CHLORIDE 9 MG/ML
INJECTION, SOLUTION INTRAVENOUS CONTINUOUS
Status: DISCONTINUED | OUTPATIENT
Start: 2022-04-12 | End: 2022-04-17

## 2022-04-12 RX ORDER — SODIUM CHLORIDE 0.9 % (FLUSH) 0.9 %
5-40 SYRINGE (ML) INJECTION PRN
Status: DISCONTINUED | OUTPATIENT
Start: 2022-04-12 | End: 2022-04-21

## 2022-04-12 RX ORDER — FOLIC ACID 1 MG/1
1 TABLET ORAL DAILY
Status: DISCONTINUED | OUTPATIENT
Start: 2022-04-12 | End: 2022-05-12 | Stop reason: HOSPADM

## 2022-04-12 RX ORDER — PROPOFOL 10 MG/ML
INJECTION, EMULSION INTRAVENOUS
Status: COMPLETED
Start: 2022-04-12 | End: 2022-04-12

## 2022-04-12 RX ORDER — ONDANSETRON 4 MG/1
4 TABLET, ORALLY DISINTEGRATING ORAL EVERY 8 HOURS PRN
Status: DISCONTINUED | OUTPATIENT
Start: 2022-04-12 | End: 2022-05-12 | Stop reason: HOSPADM

## 2022-04-12 RX ORDER — DIAZEPAM 5 MG/1
5 TABLET ORAL EVERY 8 HOURS
Status: DISCONTINUED | OUTPATIENT
Start: 2022-04-12 | End: 2022-04-12

## 2022-04-12 RX ADMIN — METHOCARBAMOL TABLETS 750 MG: 750 TABLET, COATED ORAL at 11:17

## 2022-04-12 RX ADMIN — IBUPROFEN 400 MG: 100 SUSPENSION ORAL at 10:58

## 2022-04-12 RX ADMIN — FENTANYL CITRATE 100 MCG: 50 INJECTION, SOLUTION INTRAMUSCULAR; INTRAVENOUS at 01:30

## 2022-04-12 RX ADMIN — SODIUM CHLORIDE, PRESERVATIVE FREE 10 ML: 5 INJECTION INTRAVENOUS at 19:56

## 2022-04-12 RX ADMIN — Medication 50 MCG/HR: at 02:19

## 2022-04-12 RX ADMIN — ENOXAPARIN SODIUM 30 MG: 100 INJECTION SUBCUTANEOUS at 09:41

## 2022-04-12 RX ADMIN — PROPOFOL 30 MCG/KG/MIN: 10 INJECTION, EMULSION INTRAVENOUS at 03:30

## 2022-04-12 RX ADMIN — IOPAMIDOL 130 ML: 755 INJECTION, SOLUTION INTRAVENOUS at 00:21

## 2022-04-12 RX ADMIN — DOCUSATE SODIUM 50 MG AND SENNOSIDES 8.6 MG 1 TABLET: 8.6; 5 TABLET, FILM COATED ORAL at 22:01

## 2022-04-12 RX ADMIN — SODIUM CHLORIDE: 9 INJECTION, SOLUTION INTRAVENOUS at 11:43

## 2022-04-12 RX ADMIN — IBUPROFEN 400 MG: 100 SUSPENSION ORAL at 22:00

## 2022-04-12 RX ADMIN — QUETIAPINE FUMARATE 50 MG: 25 TABLET ORAL at 22:02

## 2022-04-12 RX ADMIN — PROPOFOL 15 MCG/KG/MIN: 10 INJECTION, EMULSION INTRAVENOUS at 16:33

## 2022-04-12 RX ADMIN — Medication 500 MG: at 14:35

## 2022-04-12 RX ADMIN — BACITRACIN: 500 OINTMENT TOPICAL at 05:10

## 2022-04-12 RX ADMIN — DOCUSATE SODIUM 50 MG AND SENNOSIDES 8.6 MG 1 TABLET: 8.6; 5 TABLET, FILM COATED ORAL at 08:04

## 2022-04-12 RX ADMIN — SODIUM CHLORIDE: 9 INJECTION, SOLUTION INTRAVENOUS at 19:36

## 2022-04-12 RX ADMIN — DIAZEPAM 10 MG: 5 TABLET ORAL at 13:08

## 2022-04-12 RX ADMIN — BACITRACIN: 500 OINTMENT TOPICAL at 19:56

## 2022-04-12 RX ADMIN — BACITRACIN: 500 OINTMENT TOPICAL at 08:03

## 2022-04-12 RX ADMIN — PROPOFOL 30 MCG/KG/MIN: 10 INJECTION, EMULSION INTRAVENOUS at 00:06

## 2022-04-12 RX ADMIN — DOCUSATE SODIUM 50 MG AND SENNOSIDES 8.6 MG 1 TABLET: 8.6; 5 TABLET, FILM COATED ORAL at 05:10

## 2022-04-12 RX ADMIN — SODIUM CHLORIDE, PRESERVATIVE FREE 20 MG: 5 INJECTION INTRAVENOUS at 19:55

## 2022-04-12 RX ADMIN — SODIUM CHLORIDE, PRESERVATIVE FREE 10 ML: 5 INJECTION INTRAVENOUS at 19:57

## 2022-04-12 RX ADMIN — SODIUM CHLORIDE: 9 INJECTION, SOLUTION INTRAVENOUS at 05:08

## 2022-04-12 RX ADMIN — SODIUM CHLORIDE, PRESERVATIVE FREE 10 ML: 5 INJECTION INTRAVENOUS at 08:04

## 2022-04-12 RX ADMIN — PROPOFOL 15 MCG/KG/MIN: 10 INJECTION, EMULSION INTRAVENOUS at 15:20

## 2022-04-12 RX ADMIN — SODIUM CHLORIDE: 9 INJECTION, SOLUTION INTRAVENOUS at 22:17

## 2022-04-12 RX ADMIN — GABAPENTIN 300 MG: 600 TABLET ORAL at 11:17

## 2022-04-12 RX ADMIN — MAGNESIUM SULFATE 2000 MG: 2 INJECTION INTRAVENOUS at 22:41

## 2022-04-12 RX ADMIN — SODIUM CHLORIDE, PRESERVATIVE FREE 20 MG: 5 INJECTION INTRAVENOUS at 13:37

## 2022-04-12 RX ADMIN — DEXMEDETOMIDINE HYDROCHLORIDE 0.2 MCG/KG/HR: 4 INJECTION, SOLUTION INTRAVENOUS at 09:22

## 2022-04-12 RX ADMIN — FOLIC ACID 1 MG: 5 INJECTION, SOLUTION INTRAMUSCULAR; INTRAVENOUS; SUBCUTANEOUS at 13:40

## 2022-04-12 RX ADMIN — Medication 50 MCG/HR: at 21:21

## 2022-04-12 RX ADMIN — ERYTHROMYCIN: 5 OINTMENT OPHTHALMIC at 05:10

## 2022-04-12 RX ADMIN — ACETAMINOPHEN 1000 MG: 500 TABLET ORAL at 05:17

## 2022-04-12 RX ADMIN — GABAPENTIN 300 MG: 600 TABLET ORAL at 18:36

## 2022-04-12 RX ADMIN — PROPOFOL 10 MCG/KG/MIN: 10 INJECTION, EMULSION INTRAVENOUS at 21:13

## 2022-04-12 RX ADMIN — SODIUM CHLORIDE: 9 INJECTION, SOLUTION INTRAVENOUS at 22:37

## 2022-04-12 RX ADMIN — ENOXAPARIN SODIUM 30 MG: 100 INJECTION SUBCUTANEOUS at 19:58

## 2022-04-12 RX ADMIN — ACETAMINOPHEN 1000 MG: 500 TABLET ORAL at 13:08

## 2022-04-12 RX ADMIN — ACETAMINOPHEN 1000 MG: 500 TABLET ORAL at 22:03

## 2022-04-12 RX ADMIN — DIAZEPAM 10 MG: 5 TABLET ORAL at 22:02

## 2022-04-12 RX ADMIN — IBUPROFEN 400 MG: 100 SUSPENSION ORAL at 14:35

## 2022-04-12 RX ADMIN — QUETIAPINE FUMARATE 50 MG: 25 TABLET ORAL at 13:37

## 2022-04-12 RX ADMIN — BACITRACIN: 500 OINTMENT TOPICAL at 13:08

## 2022-04-12 ASSESSMENT — PULMONARY FUNCTION TESTS
PIF_VALUE: 20
PIF_VALUE: 20
PIF_VALUE: 21
PIF_VALUE: 14
PIF_VALUE: 21
PIF_VALUE: 21
PIF_VALUE: 20
PIF_VALUE: 15
PIF_VALUE: 19
PIF_VALUE: 20
PIF_VALUE: 28
PIF_VALUE: 20
PIF_VALUE: 22
PIF_VALUE: 20
PIF_VALUE: 19
PIF_VALUE: 19
PIF_VALUE: 21
PIF_VALUE: 20
PIF_VALUE: 20
PIF_VALUE: 19
PIF_VALUE: 20
PIF_VALUE: 20
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
## 2022-04-12 NOTE — PROCEDURES
PROCEDURE NOTE - LACERATION CLOSURE    PATIENT NAME: Lexus BRANTLEY [facility] RECORD NO. [id number]  DATE: 4/12/2022  TIME OF CLOSURE: 1603  SURGEON: Serafin  Laceration Repair Performed by: Aamir Ibarra DO    PRIMARY CARE PHYSICIAN: No primary care provider on file. PREOPERATIVE DIAGNOSIS: Laceration(s) as follows:   LOCATION: left ear   LENGTH: 3cm total   LAYERED CLOSURE: No    POSTOPERATIVE DIAGNOSIS:  Same  PROCEDURE PERFORMED:  Suture closure of laceration  SURGEON: Serafin  ESTIMATED BLOOD LOSS:  Less than 25 ml. COMPLICATIONS:  None immediately appreciated. OPERATIVE NOTE PREPARED BY: Aamir Ibarra DO     DISCUSSION:  Guilherme Salazar is a 27y.o.-year-old male. The history and physical examination were reviewed and confirmed. The diagnoses, proposed procedure, risks, possible complications, benefits and alternatives were discussed with the patient or family. Family was given the opportunity to ask questions, and once answered verbal informed consent was obtained. The patient was then prepared for the procedure. Consent: Verbal consent by family     Time out performed: Immediately prior to the procedure a \"time out\" was called to verify the correct patient, the correct procedure, equipment, support staff and site/side marked as required. Procedure: The patient was placed in the appropriate position and anesthesia around the lacerations were obtained by infiltration using 1% Lidocaine without epinephrine. The area was then cleansed using Chloraprep. The laceration was closed with 5-0 fast gut using interrupted sutures. A second laceration was closed with 5-0 fast gut using interrupted sutures. A third laceration was closed with 5-0 fast gut using interrupted sutures. The wound area was then dressed with gauze. Total repaired wound length: 3 cm. Other Items: None    The patient tolerated the procedure well.     Complications: None    Aamir Ibarra DO  4/12/22, 4:06 PM

## 2022-04-12 NOTE — PLAN OF CARE
Nonviolent restraints remain intact to prevent pulling at OET tube. No s/s of new skin breakdown while in TICU. Turn every 2 hours. Mepilex applied.
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
## 2022-04-12 NOTE — PLAN OF CARE
Nutrition Problem #1: Inadequate oral intake  Intervention: Food and/or Nutrient Delivery: Continue NPO (If TF desired, recommend Immune-enhancing at 20 mL/hr plus bolus 3 bottles protein modular daily)  Nutritional Goals: Obtain >50% of nutrition needs via all sources of intake
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
## 2022-04-12 NOTE — ED NOTES
The following labs labeled with pt sticker and tubed to lab:     [] Blue     [] Army Labella   [] on ice  [] Green/yellow  [] Green/black [] on ice  [] Yellow  [] Red  [] Pink      [x] COVID-19 swab    [x] Rapid  [] PCR (performed and sent by Writer)  [] Flu swab  [] Peds Viral Panel     [] Urine Sample  [] Pelvic Cultures  [] Blood Cultures            [place] MARK Tavera  04/12/22 0028
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
## 2022-04-12 NOTE — PLAN OF CARE
Problem: OXYGENATION/RESPIRATORY FUNCTION  Goal: Patient will maintain patent airway  4/12/2022 0158 by Roseanna Yang RCP  Outcome: Ongoing     Problem: OXYGENATION/RESPIRATORY FUNCTION  Goal: Patient will achieve/maintain normal respiratory rate/effort  Description: Respiratory rate and effort will be within normal limits for the patient  4/12/2022 0158 by Roseanna Yang RCP  Outcome: Ongoing     Problem: MECHANICAL VENTILATION  Goal: Patient will maintain patent airway  4/12/2022 0158 by Roseanna Yang RCP  Outcome: Ongoing     Problem: MECHANICAL VENTILATION  Goal: Oral health is maintained or improved  4/12/2022 0158 by Roseanna Yang RCP  Outcome: Ongoing     Problem: MECHANICAL VENTILATION  Goal: ET tube will be managed safely  4/12/2022 0158 by Roseanna Yang RCP  Outcome: Ongoing     Problem: MECHANICAL VENTILATION  Goal: Ability to express needs and understand communication  4/12/2022 0158 by Roseanna Yang RCP  Outcome: Ongoing     Problem: MECHANICAL VENTILATION  Goal: Mobility/activity is maintained at optimum level for patient  4/12/2022 0158 by Roseanna Yang RCP  Outcome: Ongoing     Problem: SKIN INTEGRITY  Goal: Skin integrity is maintained or improved  4/12/2022 0158 by Roseanna Yang RCP  Outcome: Ongoing
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
## 2022-04-12 NOTE — ED NOTES
20 of [facility]        Messi Erwin RN  04/11/22 7868

## 2022-04-12 NOTE — ED NOTES
[facility] Report given to ADAMS Rios RN. Questions answered .      Leonides Owusu RN  04/12/22 1642

## 2022-04-12 NOTE — ED NOTES
Labs drawn by MARK Cornelius. Labeled and sent to lab by catie Lr tech.       Estella Vizcaino RN  04/12/22 5155

## 2022-04-12 NOTE — PLAN OF CARE
Problem: Non-Violent Restraints  Goal: No harm/injury to patient while restraints in use  4/12/2022 1727 by Tremayne Oliveira RN  Outcome: Met This Shift  4/12/2022 0550 by Kathrin Marrero RN  Outcome: Met This Shift  Goal: Patient's dignity will be maintained  4/12/2022 1727 by Tremayne Oliveira RN  Outcome: Met This Shift  4/12/2022 0550 by Kathrin Marrero RN  Outcome: Met This Shift     Problem: OXYGENATION/RESPIRATORY FUNCTION  Goal: Patient will maintain patent airway  Outcome: Ongoing  Goal: Patient will achieve/maintain normal respiratory rate/effort  Description: Respiratory rate and effort will be within normal limits for the patient  Outcome: Ongoing     Problem: MECHANICAL VENTILATION  Goal: Patient will maintain patent airway  Outcome: Ongoing  Goal: Oral health is maintained or improved  Outcome: Ongoing  Goal: ET tube will be managed safely  Outcome: Ongoing  Goal: Ability to express needs and understand communication  Outcome: Ongoing  Goal: Mobility/activity is maintained at optimum level for patient  Outcome: Ongoing     Problem: SKIN INTEGRITY  Goal: Skin integrity is maintained or improved  4/12/2022 1727 by Tremayne Oliveira RN  Outcome: Ongoing  4/12/2022 0550 by Kathrin Marrero RN  Outcome: Ongoing     Problem: Skin Integrity:  Goal: Will show no infection signs and symptoms  Description: Will show no infection signs and symptoms  4/12/2022 1727 by Tremayne Oliveira RN  Outcome: Ongoing  4/12/2022 0550 by Kathrin Marrero RN  Outcome: Ongoing  Goal: Absence of new skin breakdown  Description: Absence of new skin breakdown  4/12/2022 1727 by Tremayne Oliveira RN  Outcome: Ongoing  4/12/2022 0550 by Kathrin Marrero RN  Outcome: Ongoing     Problem: Falls - Risk of:  Goal: Will remain free from falls  Description: Will remain free from falls  4/12/2022 1727 by Tremayne Oliveira RN  Outcome: Ongoing  4/12/2022 0550 by Kathrin Marrero RN  Outcome: Ongoing  Goal: Absence of physical injury  Description: Absence of physical injury  4/12/2022 1727 by Jovita Dickey RN  Outcome: Ongoing  4/12/2022 0550 by Rosalba Staton RN  Outcome: Ongoing     Problem: Nutrition  Goal: Optimal nutrition therapy  4/12/2022 1727 by Jovita Dickey RN  Outcome: Ongoing  4/12/2022 1150 by Loyda Nieto, MS, RD, LD  Outcome: Ongoing
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
## 2022-04-12 NOTE — PLAN OF CARE
Problem: OXYGENATION/RESPIRATORY FUNCTION  Goal: Patient will maintain patent airway  4/12/2022 1825 by Jerrod Knight RCP  Outcome: Ongoing     Problem: OXYGENATION/RESPIRATORY FUNCTION  Goal: Patient will achieve/maintain normal respiratory rate/effort  Description: Respiratory rate and effort will be within normal limits for the patient  4/12/2022 1825 by Jerrod Knight RCP  Outcome: Ongoing     Problem: MECHANICAL VENTILATION  Goal: Patient will maintain patent airway  4/12/2022 1825 by Jerrod Knight RCP  Outcome: Ongoing     Problem: MECHANICAL VENTILATION  Goal: Oral health is maintained or improved  4/12/2022 1825 by Jerrod Knight RCP  Outcome: Ongoing     Problem: MECHANICAL VENTILATION  Goal: ET tube will be managed safely  4/12/2022 1825 by Jerrod Knight RCP  Outcome: Ongoing     Problem: MECHANICAL VENTILATION  Goal: Ability to express needs and understand communication  4/12/2022 1825 by Jerrod Knight RCP  Outcome: Ongoing     Problem: MECHANICAL VENTILATION  Goal: Mobility/activity is maintained at optimum level for patient  4/12/2022 1825 by Jerrod Knight RCP  Outcome: Ongoing     Problem: SKIN INTEGRITY  Goal: Skin integrity is maintained or improved  4/12/2022 1825 by Jerrod Knight RCP  Outcome: Ongoing
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
## 2022-04-12 NOTE — ED NOTES
100 succ given by Fuad Gamino RN in left [facility]     Renan Jean RN  04/11/22 7922

## 2022-04-12 NOTE — ED NOTES
100 mcg of fentanyl given by Fuad Gamino RN.  Verbal order by Dr. Nathan Mcclain, MARK  04/12/22 9784

## 2022-04-12 NOTE — ED NOTES
Pt removed from EMS 4 point soft restraints and placed in bilateral soft wrist restraints.       Milo Alejo RN  04/12/22 8725

## 2022-04-13 ENCOUNTER — APPOINTMENT (OUTPATIENT)
Dept: GENERAL RADIOLOGY | Age: 27
DRG: 005 | End: 2022-04-13
Payer: MEDICAID

## 2022-04-13 LAB
ALLEN TEST: POSITIVE
ANION GAP SERPL CALCULATED.3IONS-SCNC: 12 MMOL/L (ref 9–17)
BUN BLDV-MCNC: 9 MG/DL (ref 6–20)
CALCIUM SERPL-MCNC: 8.3 MG/DL (ref 8.6–10.4)
CHLORIDE BLD-SCNC: 105 MMOL/L (ref 98–107)
CO2: 21 MMOL/L (ref 20–31)
CREAT SERPL-MCNC: 0.78 MG/DL (ref 0.7–1.2)
EKG ATRIAL RATE: 38 BPM
EKG ATRIAL RATE: 47 BPM
EKG P AXIS: 17 DEGREES
EKG P AXIS: 46 DEGREES
EKG P-R INTERVAL: 146 MS
EKG P-R INTERVAL: 150 MS
EKG Q-T INTERVAL: 460 MS
EKG Q-T INTERVAL: 470 MS
EKG QRS DURATION: 108 MS
EKG QRS DURATION: 110 MS
EKG QTC CALCULATION (BAZETT): 365 MS
EKG QTC CALCULATION (BAZETT): 415 MS
EKG R AXIS: 26 DEGREES
EKG R AXIS: 30 DEGREES
EKG T AXIS: 41 DEGREES
EKG T AXIS: 65 DEGREES
EKG VENTRICULAR RATE: 38 BPM
EKG VENTRICULAR RATE: 47 BPM
FIO2: 30
FIO2: 30
GFR AFRICAN AMERICAN: >60 ML/MIN
GFR NON-AFRICAN AMERICAN: >60 ML/MIN
GFR SERPL CREATININE-BSD FRML MDRD: ABNORMAL ML/MIN/{1.73_M2}
GLUCOSE BLD-MCNC: 103 MG/DL (ref 74–100)
GLUCOSE BLD-MCNC: 107 MG/DL (ref 70–99)
GLUCOSE BLD-MCNC: 109 MG/DL (ref 74–100)
HCT VFR BLD CALC: 35.9 % (ref 40.7–50.3)
HEMOGLOBIN: 12.5 G/DL (ref 13–17)
MAGNESIUM: 2.4 MG/DL (ref 1.6–2.6)
MCH RBC QN AUTO: 33.2 PG (ref 25.2–33.5)
MCHC RBC AUTO-ENTMCNC: 34.8 G/DL (ref 28.4–34.8)
MCV RBC AUTO: 95.2 FL (ref 82.6–102.9)
MODE: ABNORMAL
MRSA, DNA, NASAL: NEGATIVE
NEGATIVE BASE EXCESS, ART: 1 (ref 0–2)
NRBC AUTOMATED: 0 PER 100 WBC
O2 DEVICE/FLOW/%: ABNORMAL
O2 DEVICE/FLOW/%: ABNORMAL
PATIENT TEMP: 38
PDW BLD-RTO: 12.5 % (ref 11.8–14.4)
PHOSPHORUS: 2.7 MG/DL (ref 2.5–4.5)
PLATELET # BLD: 156 K/UL (ref 138–453)
PMV BLD AUTO: 9.8 FL (ref 8.1–13.5)
POC HCO3: 23.6 MMOL/L (ref 21–28)
POC HCO3: 24.2 MMOL/L (ref 21–28)
POC LACTIC ACID: 0.61 MMOL/L (ref 0.56–1.39)
POC LACTIC ACID: 1.01 MMOL/L (ref 0.56–1.39)
POC O2 SATURATION: 98 % (ref 94–98)
POC O2 SATURATION: 99 % (ref 94–98)
POC PCO2 TEMP: 39 MM HG
POC PCO2: 36.6 MM HG (ref 35–48)
POC PCO2: 37.7 MM HG (ref 35–48)
POC PH TEMP: 7.4
POC PH: 7.41 (ref 7.35–7.45)
POC PH: 7.42 (ref 7.35–7.45)
POC PO2 TEMP: 138 MM HG
POC PO2: 113.1 MM HG (ref 83–108)
POC PO2: 132 MM HG (ref 83–108)
POSITIVE BASE EXCESS, ART: 0 (ref 0–3)
POTASSIUM SERPL-SCNC: 3.8 MMOL/L (ref 3.7–5.3)
RBC # BLD: 3.77 M/UL (ref 4.21–5.77)
SAMPLE SITE: ABNORMAL
SAMPLE SITE: ABNORMAL
SODIUM BLD-SCNC: 138 MMOL/L (ref 135–144)
SPECIMEN DESCRIPTION: NORMAL
WBC # BLD: 7.7 K/UL (ref 3.5–11.3)

## 2022-04-13 PROCEDURE — APPNB15 APP NON BILLABLE TIME 0-15 MINS: Performed by: NURSE PRACTITIONER

## 2022-04-13 PROCEDURE — 6360000002 HC RX W HCPCS: Performed by: EMERGENCY MEDICINE

## 2022-04-13 PROCEDURE — 93010 ELECTROCARDIOGRAM REPORT: CPT | Performed by: INTERNAL MEDICINE

## 2022-04-13 PROCEDURE — 83735 ASSAY OF MAGNESIUM: CPT

## 2022-04-13 PROCEDURE — 6370000000 HC RX 637 (ALT 250 FOR IP): Performed by: STUDENT IN AN ORGANIZED HEALTH CARE EDUCATION/TRAINING PROGRAM

## 2022-04-13 PROCEDURE — 6370000000 HC RX 637 (ALT 250 FOR IP): Performed by: EMERGENCY MEDICINE

## 2022-04-13 PROCEDURE — 71045 X-RAY EXAM CHEST 1 VIEW: CPT

## 2022-04-13 PROCEDURE — 36600 WITHDRAWAL OF ARTERIAL BLOOD: CPT

## 2022-04-13 PROCEDURE — 6360000002 HC RX W HCPCS

## 2022-04-13 PROCEDURE — 2000000000 HC ICU R&B

## 2022-04-13 PROCEDURE — 80048 BASIC METABOLIC PNL TOTAL CA: CPT

## 2022-04-13 PROCEDURE — 83605 ASSAY OF LACTIC ACID: CPT

## 2022-04-13 PROCEDURE — 6360000002 HC RX W HCPCS: Performed by: STUDENT IN AN ORGANIZED HEALTH CARE EDUCATION/TRAINING PROGRAM

## 2022-04-13 PROCEDURE — 2500000003 HC RX 250 WO HCPCS: Performed by: STUDENT IN AN ORGANIZED HEALTH CARE EDUCATION/TRAINING PROGRAM

## 2022-04-13 PROCEDURE — 93005 ELECTROCARDIOGRAM TRACING: CPT | Performed by: SURGERY

## 2022-04-13 PROCEDURE — 84100 ASSAY OF PHOSPHORUS: CPT

## 2022-04-13 PROCEDURE — 2580000003 HC RX 258: Performed by: EMERGENCY MEDICINE

## 2022-04-13 PROCEDURE — 82947 ASSAY GLUCOSE BLOOD QUANT: CPT

## 2022-04-13 PROCEDURE — 31500 INSERT EMERGENCY AIRWAY: CPT

## 2022-04-13 PROCEDURE — 94761 N-INVAS EAR/PLS OXIMETRY MLT: CPT

## 2022-04-13 PROCEDURE — 94003 VENT MGMT INPAT SUBQ DAY: CPT

## 2022-04-13 PROCEDURE — 85027 COMPLETE CBC AUTOMATED: CPT

## 2022-04-13 PROCEDURE — 36415 COLL VENOUS BLD VENIPUNCTURE: CPT

## 2022-04-13 PROCEDURE — 2580000003 HC RX 258: Performed by: STUDENT IN AN ORGANIZED HEALTH CARE EDUCATION/TRAINING PROGRAM

## 2022-04-13 PROCEDURE — 82803 BLOOD GASES ANY COMBINATION: CPT

## 2022-04-13 PROCEDURE — 2700000000 HC OXYGEN THERAPY PER DAY

## 2022-04-13 PROCEDURE — 6360000002 HC RX W HCPCS: Performed by: SURGERY

## 2022-04-13 RX ORDER — SODIUM CHLORIDE 0.9 % (FLUSH) 0.9 %
5-40 SYRINGE (ML) INJECTION EVERY 12 HOURS SCHEDULED
Status: DISCONTINUED | OUTPATIENT
Start: 2022-04-13 | End: 2022-04-27

## 2022-04-13 RX ORDER — POLYETHYLENE GLYCOL 3350 17 G/17G
17 POWDER, FOR SOLUTION ORAL DAILY
Status: DISCONTINUED | OUTPATIENT
Start: 2022-04-13 | End: 2022-05-12 | Stop reason: HOSPADM

## 2022-04-13 RX ORDER — HALOPERIDOL 5 MG/ML
INJECTION INTRAMUSCULAR
Status: DISCONTINUED
Start: 2022-04-13 | End: 2022-04-13 | Stop reason: WASHOUT

## 2022-04-13 RX ORDER — LORAZEPAM 2 MG/ML
INJECTION INTRAMUSCULAR
Status: COMPLETED
Start: 2022-04-13 | End: 2022-04-13

## 2022-04-13 RX ORDER — IBUPROFEN 400 MG/1
400 TABLET ORAL EVERY 4 HOURS
Status: DISCONTINUED | OUTPATIENT
Start: 2022-04-13 | End: 2022-04-13

## 2022-04-13 RX ORDER — HALOPERIDOL 5 MG/ML
5 INJECTION INTRAMUSCULAR ONCE
Status: COMPLETED | OUTPATIENT
Start: 2022-04-13 | End: 2022-04-13

## 2022-04-13 RX ORDER — SODIUM CHLORIDE 0.9 % (FLUSH) 0.9 %
5-40 SYRINGE (ML) INJECTION PRN
Status: DISCONTINUED | OUTPATIENT
Start: 2022-04-13 | End: 2022-04-21

## 2022-04-13 RX ORDER — SODIUM CHLORIDE 9 MG/ML
INJECTION, SOLUTION INTRAVENOUS PRN
Status: DISCONTINUED | OUTPATIENT
Start: 2022-04-13 | End: 2022-05-12 | Stop reason: HOSPADM

## 2022-04-13 RX ORDER — FAMOTIDINE 20 MG/1
20 TABLET, FILM COATED ORAL 2 TIMES DAILY
Status: DISCONTINUED | OUTPATIENT
Start: 2022-04-13 | End: 2022-05-12 | Stop reason: HOSPADM

## 2022-04-13 RX ORDER — QUETIAPINE FUMARATE 100 MG/1
100 TABLET, FILM COATED ORAL 2 TIMES DAILY
Status: DISCONTINUED | OUTPATIENT
Start: 2022-04-13 | End: 2022-04-19

## 2022-04-13 RX ADMIN — SODIUM CHLORIDE, PRESERVATIVE FREE 10 ML: 5 INJECTION INTRAVENOUS at 21:01

## 2022-04-13 RX ADMIN — BACITRACIN: 500 OINTMENT TOPICAL at 08:58

## 2022-04-13 RX ADMIN — SODIUM CHLORIDE: 9 INJECTION, SOLUTION INTRAVENOUS at 03:21

## 2022-04-13 RX ADMIN — Medication 50 MCG/HR: at 20:16

## 2022-04-13 RX ADMIN — METOCLOPRAMIDE 10 MG: 10 TABLET ORAL at 05:29

## 2022-04-13 RX ADMIN — FAMOTIDINE 20 MG: 20 TABLET, FILM COATED ORAL at 20:58

## 2022-04-13 RX ADMIN — IBUPROFEN 400 MG: 100 SUSPENSION ORAL at 02:25

## 2022-04-13 RX ADMIN — ACETAMINOPHEN 1000 MG: 500 TABLET ORAL at 05:29

## 2022-04-13 RX ADMIN — BACITRACIN: 500 OINTMENT TOPICAL at 21:00

## 2022-04-13 RX ADMIN — QUETIAPINE FUMARATE 100 MG: 100 TABLET ORAL at 20:58

## 2022-04-13 RX ADMIN — DIAZEPAM 10 MG: 5 TABLET ORAL at 21:05

## 2022-04-13 RX ADMIN — IBUPROFEN 400 MG: 100 SUSPENSION ORAL at 13:08

## 2022-04-13 RX ADMIN — ENOXAPARIN SODIUM 30 MG: 100 INJECTION SUBCUTANEOUS at 20:58

## 2022-04-13 RX ADMIN — GABAPENTIN 300 MG: 600 TABLET ORAL at 11:20

## 2022-04-13 RX ADMIN — DOCUSATE SODIUM 50 MG AND SENNOSIDES 8.6 MG 1 TABLET: 8.6; 5 TABLET, FILM COATED ORAL at 20:58

## 2022-04-13 RX ADMIN — FOLIC ACID 1 MG: 1 TABLET ORAL at 08:57

## 2022-04-13 RX ADMIN — IBUPROFEN 400 MG: 100 SUSPENSION ORAL at 06:14

## 2022-04-13 RX ADMIN — SODIUM CHLORIDE, PRESERVATIVE FREE 10 ML: 5 INJECTION INTRAVENOUS at 20:18

## 2022-04-13 RX ADMIN — GABAPENTIN 300 MG: 600 TABLET ORAL at 02:25

## 2022-04-13 RX ADMIN — PROPOFOL 20 MCG/KG/MIN: 10 INJECTION, EMULSION INTRAVENOUS at 17:54

## 2022-04-13 RX ADMIN — SODIUM CHLORIDE, PRESERVATIVE FREE 20 MG: 5 INJECTION INTRAVENOUS at 08:57

## 2022-04-13 RX ADMIN — METOCLOPRAMIDE 10 MG: 10 TABLET ORAL at 11:21

## 2022-04-13 RX ADMIN — ACETAMINOPHEN 1000 MG: 500 TABLET ORAL at 22:26

## 2022-04-13 RX ADMIN — SODIUM CHLORIDE, PRESERVATIVE FREE 10 ML: 5 INJECTION INTRAVENOUS at 08:58

## 2022-04-13 RX ADMIN — DIAZEPAM 10 MG: 5 TABLET ORAL at 03:32

## 2022-04-13 RX ADMIN — GABAPENTIN 300 MG: 600 TABLET ORAL at 20:59

## 2022-04-13 RX ADMIN — Medication 500 MG: at 08:57

## 2022-04-13 RX ADMIN — POLYETHYLENE GLYCOL 3350 17 G: 17 POWDER, FOR SOLUTION ORAL at 11:20

## 2022-04-13 RX ADMIN — IBUPROFEN 400 MG: 100 SUSPENSION ORAL at 10:14

## 2022-04-13 RX ADMIN — BACITRACIN: 500 OINTMENT TOPICAL at 13:09

## 2022-04-13 RX ADMIN — SODIUM CHLORIDE, PRESERVATIVE FREE 10 ML: 5 INJECTION INTRAVENOUS at 21:00

## 2022-04-13 RX ADMIN — ACETAMINOPHEN 1000 MG: 500 TABLET ORAL at 13:08

## 2022-04-13 RX ADMIN — ENOXAPARIN SODIUM 30 MG: 100 INJECTION SUBCUTANEOUS at 08:57

## 2022-04-13 RX ADMIN — SODIUM CHLORIDE: 9 INJECTION, SOLUTION INTRAVENOUS at 21:22

## 2022-04-13 RX ADMIN — METOCLOPRAMIDE 10 MG: 10 TABLET ORAL at 00:07

## 2022-04-13 RX ADMIN — SODIUM CHLORIDE: 9 INJECTION, SOLUTION INTRAVENOUS at 11:22

## 2022-04-13 RX ADMIN — METOCLOPRAMIDE 10 MG: 10 TABLET ORAL at 20:58

## 2022-04-13 RX ADMIN — LORAZEPAM 2 MG: 2 INJECTION INTRAMUSCULAR; INTRAVENOUS at 17:06

## 2022-04-13 RX ADMIN — KETAMINE HYDROCHLORIDE 0.2 MG/KG/HR: 50 INJECTION INTRAMUSCULAR; INTRAVENOUS at 19:55

## 2022-04-13 RX ADMIN — ERYTHROMYCIN: 5 OINTMENT OPHTHALMIC at 22:26

## 2022-04-13 RX ADMIN — IBUPROFEN 400 MG: 100 SUSPENSION ORAL at 21:07

## 2022-04-13 RX ADMIN — DIAZEPAM 10 MG: 5 TABLET ORAL at 15:24

## 2022-04-13 RX ADMIN — DIAZEPAM 10 MG: 5 TABLET ORAL at 10:14

## 2022-04-13 RX ADMIN — HALOPERIDOL LACTATE 5 MG: 5 INJECTION, SOLUTION INTRAMUSCULAR at 16:48

## 2022-04-13 RX ADMIN — QUETIAPINE FUMARATE 50 MG: 25 TABLET ORAL at 10:14

## 2022-04-13 RX ADMIN — DOCUSATE SODIUM 50 MG AND SENNOSIDES 8.6 MG 1 TABLET: 8.6; 5 TABLET, FILM COATED ORAL at 08:57

## 2022-04-13 ASSESSMENT — PULMONARY FUNCTION TESTS
PIF_VALUE: 20
PIF_VALUE: 9
PIF_VALUE: 19
PIF_VALUE: 19
PIF_VALUE: 20
PIF_VALUE: 20
PIF_VALUE: 21
PIF_VALUE: 12
PIF_VALUE: 20
PIF_VALUE: 20
PIF_VALUE: 19
PIF_VALUE: 20
PIF_VALUE: 21
PIF_VALUE: 17
PIF_VALUE: 20
PIF_VALUE: 18
PIF_VALUE: 20
PIF_VALUE: 19
PIF_VALUE: 16
PIF_VALUE: 20
PIF_VALUE: 19
PIF_VALUE: 19
PIF_VALUE: 21
PIF_VALUE: 19
PIF_VALUE: 10
PIF_VALUE: 20
PIF_VALUE: 20
PIF_VALUE: 19
PIF_VALUE: 18
PIF_VALUE: 20
PIF_VALUE: 19
PIF_VALUE: 19
PIF_VALUE: 21
PIF_VALUE: 20
PIF_VALUE: 20
PIF_VALUE: 21
PIF_VALUE: 21
PIF_VALUE: 19
PIF_VALUE: 19
PIF_VALUE: 20
PIF_VALUE: 12
PIF_VALUE: 18
PIF_VALUE: 19

## 2022-04-13 NOTE — FLOWSHEET NOTE
Patient was re-intubated for aggression. Timeout was preformed at 18- Dr. Domonique Aguilera RT, darryl, [place], [place], Katherine Pozo, and Amadeo Andres RN's at bedside. 40mg of etomidate and 200 mg of succinylcholine was given at 1743.  1743 Dr. Esha Bey used glidescope, ETT inserted- 25 at the lip. 1744 positive color change and bi-lateral breath sounds.

## 2022-04-13 NOTE — PROGRESS NOTES
Occupational [address]  Occupational Therapy Not Seen Note    DATE: 2022    NAME: Anita Summers  MRN: [id number]   : 1995      Patient not seen this date for Occupational Therapy due to:    Sedation: Int/Sed/SBR per chart. Next Scheduled Treatment: Attempt on  as appropriate.     Electronically signed by Ricardo Nicholson OT on 2022 at 7:23 AM

## 2022-04-13 NOTE — FLOWSHEET NOTE
Pt became very restless, sitting up in bed moving BUE and BLE. Not tracking or opening eyes.  Trauma resident called to bedside and sedation restarted

## 2022-04-13 NOTE — PROGRESS NOTES
Received verbal order to extubate patient. Order also placed in epic.   Pt suctioned and extubated to 4 L N/C.
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
## 2022-04-13 NOTE — FLOWSHEET NOTE
Pt extubated at 18 per Dr. Carley Keller verbal order. Placed on 4L nasal cannula. Ellis Freed RN and Estela RN at bedside. 1215 pt having difficulty clearing secretions; however, attempting to climb out of bed. NT suctioned by RT. Pt has strong cough during procedure. SPO2 100%.

## 2022-04-13 NOTE — PLAN OF CARE
Problem: OXYGENATION/RESPIRATORY FUNCTION  Goal: Patient will maintain patent airway  4/13/2022 0904 by Laurence Tong RCP  Outcome: Ongoing     Problem: OXYGENATION/RESPIRATORY FUNCTION  Goal: Patient will achieve/maintain normal respiratory rate/effort  Description: Respiratory rate and effort will be within normal limits for the patient  4/13/2022 0904 by Laurence Tong RCP  Outcome: Ongoing     Problem: MECHANICAL VENTILATION  Goal: Patient will maintain patent airway  4/13/2022 0904 by Laurence Tong RCP  Outcome: Ongoing     Problem: MECHANICAL VENTILATION  Goal: Oral health is maintained or improved  4/13/2022 0904 by Laurence Tong RCP  Outcome: Ongoing     Problem: MECHANICAL VENTILATION  Goal: ET tube will be managed safely  4/13/2022 0904 by Laurence Tong RCP  Outcome: Ongoing     Problem: MECHANICAL VENTILATION  Goal: Ability to express needs and understand communication  4/13/2022 0904 by Laurence Tong RCP  Outcome: Ongoing     Problem: MECHANICAL VENTILATION  Goal: Mobility/activity is maintained at optimum level for patient  4/13/2022 0904 by Laurence Tong RCP  Outcome: Ongoing     Problem: SKIN INTEGRITY  Goal: Skin integrity is maintained or improved  4/13/2022 0904 by Laurence Tong RCP  Outcome: Ongoing

## 2022-04-13 NOTE — PLAN OF CARE
Problem: OXYGENATION/RESPIRATORY FUNCTION  Goal: Patient will maintain patent airway  4/13/2022 1533 by Giuliano Damon RN  Outcome: Ongoing  4/13/2022 0904 by Kathryn Arroyo RCP  Outcome: Ongoing  Goal: Patient will achieve/maintain normal respiratory rate/effort  Description: Respiratory rate and effort will be within normal limits for the patient  4/13/2022 1533 by Giuliano Damon RN  Outcome: Ongoing  4/13/2022 0904 by Kathryn Arroyo RCP  Outcome: Ongoing     Problem: MECHANICAL VENTILATION  Goal: Patient will maintain patent airway  4/13/2022 1533 by Giuliano Damon RN  Outcome: Ongoing  4/13/2022 0904 by Kathryn Arroyo RCP  Outcome: Ongoing  Goal: Oral health is maintained or improved  4/13/2022 1533 by Giuliano Damon RN  Outcome: Ongoing  4/13/2022 0904 by Kathryn Arroyo RCP  Outcome: Ongoing  Goal: ET tube will be managed safely  4/13/2022 1533 by Giuliano Damon RN  Outcome: Ongoing  4/13/2022 0904 by Kathryn Arroyo RCP  Outcome: Ongoing  Goal: Ability to express needs and understand communication  4/13/2022 1533 by Giuliano Damon RN  Outcome: Ongoing  4/13/2022 0904 by Kathryn Arroyo RCP  Outcome: Ongoing  Goal: Mobility/activity is maintained at optimum level for patient  4/13/2022 1533 by Giuliano Damon RN  Outcome: Ongoing  4/13/2022 0904 by Kathryn Arroyo RCP  Outcome: Ongoing     Problem: SKIN INTEGRITY  Goal: Skin integrity is maintained or improved  4/13/2022 1533 by Giuliano Damon RN  Outcome: Ongoing  4/13/2022 0904 by Kathryn Arroyo RCP  Outcome: Ongoing     Problem: Non-Violent Restraints  Goal: Removal from restraints as soon as assessed to be safe  Outcome: Ongoing  Goal: No harm/injury to patient while restraints in use  Outcome: Ongoing  Goal: Patient's dignity will be maintained  Outcome: Ongoing     Problem: Skin Integrity:  Goal: Will show no infection signs and symptoms  Description: Will show no infection signs and symptoms  Outcome: Ongoing  Goal: Absence of new skin breakdown  Description: Absence of new skin breakdown  Outcome: Ongoing     Problem: Falls - Risk of:  Goal: Will remain free from falls  Description: Will remain free from falls  Outcome: Ongoing  Goal: Absence of physical injury  Description: Absence of physical injury  Outcome: Ongoing     Problem: Nutrition  Goal: Optimal nutrition therapy  Outcome: Ongoing

## 2022-04-13 NOTE — PLAN OF CARE
Problem: Non-Violent Restraints  Goal: No harm/injury to patient while restraints in use  4/13/2022 1535 by Sachi Turcios RN  Outcome: Met This Shift  4/13/2022 1533 by Sachi Turcios RN  Outcome: Ongoing  Goal: Patient's dignity will be maintained  4/13/2022 1535 by Sachi Turcios RN  Outcome: Met This Shift  4/13/2022 1533 by Sachi Turcios RN  Outcome: Ongoing     Problem: OXYGENATION/RESPIRATORY FUNCTION  Goal: Patient will maintain patent airway  4/13/2022 1535 by Sachi Turcios RN  Outcome: Ongoing  4/13/2022 1533 by Sachi Turcios RN  Outcome: Ongoing  4/13/2022 0904 by Rachna Garenr RCP  Outcome: Ongoing  Goal: Patient will achieve/maintain normal respiratory rate/effort  Description: Respiratory rate and effort will be within normal limits for the patient  4/13/2022 1535 by Sachi Turcios RN  Outcome: Ongoing  4/13/2022 1533 by Sachi Turcios RN  Outcome: Ongoing  4/13/2022 0904 by Rachna Garner RCP  Outcome: Ongoing     Problem: SKIN INTEGRITY  Goal: Skin integrity is maintained or improved  4/13/2022 1535 by Sachi Turcios RN  Outcome: Ongoing  4/13/2022 1533 by Sachi Turcios RN  Outcome: Ongoing  4/13/2022 0904 by Rachna Garner RCP  Outcome: Ongoing     Problem: Non-Violent Restraints  Goal: Removal from restraints as soon as assessed to be safe  4/13/2022 1535 by Sachi Turcios RN  Outcome: Ongoing  4/13/2022 1533 by Sachi Turcios RN  Outcome: Ongoing     Problem: Skin Integrity:  Goal: Will show no infection signs and symptoms  Description: Will show no infection signs and symptoms  4/13/2022 1535 by Sachi Turcios RN  Outcome: Ongoing  4/13/2022 1533 by Sachi Turcios RN  Outcome: Ongoing  Goal: Absence of new skin breakdown  Description: Absence of new skin breakdown  4/13/2022 1535 by Sachi Turcios RN  Outcome: Ongoing  4/13/2022 1533 by Sachi Turcios RN  Outcome: Ongoing     Problem: Falls - Risk of:  Goal: Will remain free from falls  Description: Will remain free from falls  4/13/2022 1535 by Herschell Galeazzi, RN  Outcome: Ongoing  4/13/2022 1533 by Herschell Galeazzi, RN  Outcome: Ongoing  Goal: Absence of physical injury  Description: Absence of physical injury  4/13/2022 1535 by Herschell Galeazzi, RN  Outcome: Ongoing  4/13/2022 1533 by Herschell Galeazzi, RN  Outcome: Ongoing     Problem: Nutrition  Goal: Optimal nutrition therapy  4/13/2022 1535 by Herschell Galeazzi, RN  Outcome: Ongoing  4/13/2022 1533 by Herschell Galeazzi, RN  Outcome: Ongoing     Problem: MECHANICAL VENTILATION  Goal: Patient will maintain patent airway  4/13/2022 1535 by Herschell Galeazzi, RN  Outcome: Completed  4/13/2022 1533 by Herschell Galeazzi, RN  Outcome: Ongoing  4/13/2022 0904 by Marizol Pace RCP  Outcome: Ongoing  Goal: Oral health is maintained or improved  4/13/2022 1535 by Herschell Galeazzi, RN  Outcome: Completed  4/13/2022 1533 by Herschell Galeazzi, RN  Outcome: Ongoing  4/13/2022 0904 by Marizol Pace RCP  Outcome: Ongoing  Goal: ET tube will be managed safely  4/13/2022 1535 by Herschell Galeazzi, RN  Outcome: Completed  4/13/2022 1533 by Herschell Galeazzi, RN  Outcome: Ongoing  4/13/2022 0904 by Marizol Pace RCP  Outcome: Ongoing  Goal: Ability to express needs and understand communication  4/13/2022 1535 by Herschell Galeazzi, RN  Outcome: Completed  4/13/2022 1533 by Herschell Galeazzi, RN  Outcome: Ongoing  4/13/2022 0904 by Marizol Pace RCP  Outcome: Ongoing  Goal: Mobility/activity is maintained at optimum level for patient  4/13/2022 1535 by Herschell Galeazzi, RN  Outcome: Completed  4/13/2022 1533 by Herschell Galeazzi, RN  Outcome: Ongoing  4/13/2022 0904 by Calleen Alter, RCP  Outcome: Ongoing     Problem: Suicide risk  Goal: Provide patient with safe environment  Description: Provide patient with safe environment  Outcome: Completed
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
## 2022-04-13 NOTE — PLAN OF CARE
Problem: OXYGENATION/RESPIRATORY FUNCTION  Goal: Patient will maintain patent airway  4/13/2022 0000 by Will Hunt RN  Outcome: Ongoing  4/12/2022 1825 by Meri Moreira RCP  Outcome: Ongoing  4/12/2022 1727 by Cesia Tolliver RN  Outcome: Ongoing  Goal: Patient will achieve/maintain normal respiratory rate/effort  Description: Respiratory rate and effort will be within normal limits for the patient  4/13/2022 0000 by Will Hunt RN  Outcome: Ongoing  4/12/2022 1825 by Meri Moreira RCP  Outcome: Ongoing  4/12/2022 1727 by Cesia Tolliver RN  Outcome: Ongoing     Problem: MECHANICAL VENTILATION  Goal: Patient will maintain patent airway  4/13/2022 0000 by Will Hunt RN  Outcome: Ongoing  4/12/2022 1825 by Meri Moreira RCP  Outcome: Ongoing  4/12/2022 1727 by Cesia Tolliver RN  Outcome: Ongoing  Goal: Oral health is maintained or improved  4/13/2022 0000 by Will Hunt RN  Outcome: Ongoing  4/12/2022 1825 by Meri Moreira RCP  Outcome: Ongoing  4/12/2022 1727 by Cesia Tolliver RN  Outcome: Ongoing  Goal: ET tube will be managed safely  4/13/2022 0000 by Will Hunt RN  Outcome: Ongoing  4/12/2022 1825 by Meri Moreira RCP  Outcome: Ongoing  4/12/2022 1727 by Cesia Tolliver RN  Outcome: Ongoing  Goal: Ability to express needs and understand communication  4/13/2022 0000 by Will Hunt RN  Outcome: Ongoing  4/12/2022 1825 by Meri Moreira RCP  Outcome: Ongoing  4/12/2022 1727 by Cesia Tolliver RN  Outcome: Ongoing  Goal: Mobility/activity is maintained at optimum level for patient  4/13/2022 0000 by Will Hunt RN  Outcome: Ongoing  4/12/2022 1825 by Meri Moreira RCP  Outcome: Ongoing  4/12/2022 1727 by Cesia Tolliver RN  Outcome: Ongoing     Problem: SKIN INTEGRITY  Goal: Skin integrity is maintained or improved  4/13/2022 0000 by Will Hunt RN  Outcome: Ongoing  4/12/2022 1825 by Meri Close, RCP  Outcome: Ongoing  4/12/2022 1727 by Cesia Tolliver RN  Outcome: Ongoing     Problem:

## 2022-04-13 NOTE — PROCEDURES
PROCEDURE NOTE - EMERGENCY INTUBATION    PATIENT NAME: Lexus BRANTLEY [facility] RECORD NO. [id number]  DATE: 4/13/2022  ATTENDING PHYSICIAN: Dr Henry Richards DIAGNOSIS:  Acute Respiratory Failure  POSTOPERATIVE DIAGNOSIS:  Same  PROCEDURE PERFORMED:  Emergency endotracheal intubation  PERFORMING PHYSICIAN: Misael Flor MD    MEDICATIONS: etomidate intravenously and succinycholine intravenously    DISCUSSION:  Veleta Dance is a 27y.o.-year-old male who requires intubation and ventilatory support due to airway protection. The history and physical examination were reviewed and confirmed. CONSENT: Unable to be obtained due to patient's condition. PROCEDURE:  A timeout was initiated by the bedside nurse and was confirmed by those present. The patient was placed in the appropriate position with cervical spine immobilization maintained throughout the procedure. Cricoid pressure was utilized. Intubation was performed by direct laryngoscopy using a laryngoscope and a 7.5 cuffed endotracheal tube. The cuff was then inflated and the tube was secured appropriately at a distance of 25 cm to the lip. Initial confirmation of placement included bilateral breath sounds, an end tidal CO2 detector, absence of sounds over the stomach, tube fogging and adequate chest rise. A chest x-ray to verify correct placement of the tube has been ordered but is still pending. The patient tolerated the procedure well.      COMPLICATIONS:  None     Misael Flor MD  5:47 PM, 4/13/22

## 2022-04-14 ENCOUNTER — APPOINTMENT (OUTPATIENT)
Dept: GENERAL RADIOLOGY | Age: 27
DRG: 005 | End: 2022-04-14
Payer: MEDICAID

## 2022-04-14 ENCOUNTER — APPOINTMENT (OUTPATIENT)
Dept: MRI IMAGING | Age: 27
DRG: 005 | End: 2022-04-14
Payer: MEDICAID

## 2022-04-14 LAB
ALLEN TEST: ABNORMAL
ANION GAP SERPL CALCULATED.3IONS-SCNC: 4 MMOL/L (ref 9–17)
ANION GAP: 12 MMOL/L (ref 7–16)
BUN BLDV-MCNC: 8 MG/DL (ref 6–20)
CALCIUM SERPL-MCNC: 8.2 MG/DL (ref 8.6–10.4)
CHLORIDE BLD-SCNC: 115 MMOL/L (ref 98–107)
CO2: 24 MMOL/L (ref 20–31)
CREAT SERPL-MCNC: 0.92 MG/DL (ref 0.7–1.2)
FIO2: 30
FIO2: 30
GFR AFRICAN AMERICAN: >60 ML/MIN
GFR NON-AFRICAN AMERICAN: >60 ML/MIN
GFR NON-AFRICAN AMERICAN: >60 ML/MIN
GFR SERPL CREATININE-BSD FRML MDRD: >60 ML/MIN
GFR SERPL CREATININE-BSD FRML MDRD: ABNORMAL ML/MIN/{1.73_M2}
GFR SERPL CREATININE-BSD FRML MDRD: NORMAL ML/MIN/{1.73_M2}
GLUCOSE BLD-MCNC: 104 MG/DL (ref 74–100)
GLUCOSE BLD-MCNC: 93 MG/DL (ref 74–100)
GLUCOSE BLD-MCNC: 94 MG/DL (ref 70–99)
HCT VFR BLD CALC: 33.8 % (ref 40.7–50.3)
HEMOGLOBIN: 11.3 G/DL (ref 13–17)
MAGNESIUM: 1.9 MG/DL (ref 1.6–2.6)
MCH RBC QN AUTO: 32.9 PG (ref 25.2–33.5)
MCHC RBC AUTO-ENTMCNC: 33.4 G/DL (ref 28.4–34.8)
MCV RBC AUTO: 98.5 FL (ref 82.6–102.9)
MODE: ABNORMAL
NEGATIVE BASE EXCESS, ART: 1 (ref 0–2)
NEGATIVE BASE EXCESS, ART: 4 (ref 0–2)
NRBC AUTOMATED: 0 PER 100 WBC
O2 DEVICE/FLOW/%: ABNORMAL
O2 DEVICE/FLOW/%: ABNORMAL
PATIENT TEMP: 37
PATIENT TEMP: 37
PDW BLD-RTO: 12.9 % (ref 11.8–14.4)
PHOSPHORUS: 1.9 MG/DL (ref 2.5–4.5)
PLATELET # BLD: 142 K/UL (ref 138–453)
PMV BLD AUTO: 10 FL (ref 8.1–13.5)
POC BUN: 6 MG/DL (ref 8–26)
POC CHLORIDE: 114 MMOL/L (ref 98–107)
POC CREATININE: 0.79 MG/DL (ref 0.51–1.19)
POC HCO3: 20 MMOL/L (ref 21–28)
POC HCO3: 23.5 MMOL/L (ref 21–28)
POC HEMATOCRIT: 33 % (ref 41–53)
POC HEMOGLOBIN: 11.2 G/DL (ref 13.5–17.5)
POC IONIZED CALCIUM: 1.14 MMOL/L (ref 1.15–1.33)
POC LACTIC ACID: 0.5 MMOL/L (ref 0.56–1.39)
POC LACTIC ACID: 0.66 MMOL/L (ref 0.56–1.39)
POC O2 SATURATION: 98 % (ref 94–98)
POC O2 SATURATION: 98 % (ref 94–98)
POC PCO2: 33 MM HG (ref 35–48)
POC PCO2: 38.7 MM HG (ref 35–48)
POC PH: 7.39 (ref 7.35–7.45)
POC PH: 7.39 (ref 7.35–7.45)
POC PO2: 111.1 MM HG (ref 83–108)
POC PO2: 113.8 MM HG (ref 83–108)
POC POTASSIUM: 3.9 MMOL/L (ref 3.5–4.5)
POC SODIUM: 146 MMOL/L (ref 138–146)
POC TCO2: 21 MMOL/L (ref 22–30)
POTASSIUM SERPL-SCNC: 3.8 MMOL/L (ref 3.7–5.3)
RBC # BLD: 3.43 M/UL (ref 4.21–5.77)
SAMPLE SITE: ABNORMAL
SAMPLE SITE: ABNORMAL
SODIUM BLD-SCNC: 143 MMOL/L (ref 135–144)
WBC # BLD: 6.3 K/UL (ref 3.5–11.3)

## 2022-04-14 PROCEDURE — APPNB30 APP NON BILLABLE TIME 0-30 MINS: Performed by: REGISTERED NURSE

## 2022-04-14 PROCEDURE — 83605 ASSAY OF LACTIC ACID: CPT

## 2022-04-14 PROCEDURE — 94761 N-INVAS EAR/PLS OXIMETRY MLT: CPT

## 2022-04-14 PROCEDURE — 2580000003 HC RX 258: Performed by: STUDENT IN AN ORGANIZED HEALTH CARE EDUCATION/TRAINING PROGRAM

## 2022-04-14 PROCEDURE — 71045 X-RAY EXAM CHEST 1 VIEW: CPT

## 2022-04-14 PROCEDURE — 6370000000 HC RX 637 (ALT 250 FOR IP): Performed by: EMERGENCY MEDICINE

## 2022-04-14 PROCEDURE — 6360000002 HC RX W HCPCS: Performed by: EMERGENCY MEDICINE

## 2022-04-14 PROCEDURE — 6370000000 HC RX 637 (ALT 250 FOR IP): Performed by: STUDENT IN AN ORGANIZED HEALTH CARE EDUCATION/TRAINING PROGRAM

## 2022-04-14 PROCEDURE — 2700000000 HC OXYGEN THERAPY PER DAY

## 2022-04-14 PROCEDURE — 2580000003 HC RX 258: Performed by: EMERGENCY MEDICINE

## 2022-04-14 PROCEDURE — 84520 ASSAY OF UREA NITROGEN: CPT

## 2022-04-14 PROCEDURE — 80048 BASIC METABOLIC PNL TOTAL CA: CPT

## 2022-04-14 PROCEDURE — 82330 ASSAY OF CALCIUM: CPT

## 2022-04-14 PROCEDURE — 82565 ASSAY OF CREATININE: CPT

## 2022-04-14 PROCEDURE — 70551 MRI BRAIN STEM W/O DYE: CPT

## 2022-04-14 PROCEDURE — 80051 ELECTROLYTE PANEL: CPT

## 2022-04-14 PROCEDURE — 82803 BLOOD GASES ANY COMBINATION: CPT

## 2022-04-14 PROCEDURE — 83735 ASSAY OF MAGNESIUM: CPT

## 2022-04-14 PROCEDURE — 36620 INSERTION CATHETER ARTERY: CPT

## 2022-04-14 PROCEDURE — 84100 ASSAY OF PHOSPHORUS: CPT

## 2022-04-14 PROCEDURE — 6360000002 HC RX W HCPCS: Performed by: STUDENT IN AN ORGANIZED HEALTH CARE EDUCATION/TRAINING PROGRAM

## 2022-04-14 PROCEDURE — 2500000003 HC RX 250 WO HCPCS: Performed by: EMERGENCY MEDICINE

## 2022-04-14 PROCEDURE — 2000000000 HC ICU R&B

## 2022-04-14 PROCEDURE — 6360000002 HC RX W HCPCS: Performed by: SURGERY

## 2022-04-14 PROCEDURE — 85014 HEMATOCRIT: CPT

## 2022-04-14 PROCEDURE — 85027 COMPLETE CBC AUTOMATED: CPT

## 2022-04-14 PROCEDURE — 36415 COLL VENOUS BLD VENIPUNCTURE: CPT

## 2022-04-14 PROCEDURE — 94003 VENT MGMT INPAT SUBQ DAY: CPT

## 2022-04-14 PROCEDURE — 36600 WITHDRAWAL OF ARTERIAL BLOOD: CPT

## 2022-04-14 PROCEDURE — 82947 ASSAY GLUCOSE BLOOD QUANT: CPT

## 2022-04-14 RX ORDER — 0.9 % SODIUM CHLORIDE 0.9 %
500 INTRAVENOUS SOLUTION INTRAVENOUS ONCE
Status: COMPLETED | OUTPATIENT
Start: 2022-04-14 | End: 2022-04-14

## 2022-04-14 RX ORDER — IBUPROFEN 400 MG/1
400 TABLET ORAL EVERY 6 HOURS PRN
Status: DISCONTINUED | OUTPATIENT
Start: 2022-04-14 | End: 2022-04-19

## 2022-04-14 RX ORDER — BISACODYL 10 MG
10 SUPPOSITORY, RECTAL RECTAL DAILY
Status: DISCONTINUED | OUTPATIENT
Start: 2022-04-14 | End: 2022-05-12 | Stop reason: HOSPADM

## 2022-04-14 RX ORDER — FENTANYL CITRATE 50 UG/ML
100 INJECTION, SOLUTION INTRAMUSCULAR; INTRAVENOUS
Status: DISCONTINUED | OUTPATIENT
Start: 2022-04-14 | End: 2022-04-22

## 2022-04-14 RX ORDER — MAGNESIUM SULFATE IN WATER 40 MG/ML
4000 INJECTION, SOLUTION INTRAVENOUS ONCE
Status: COMPLETED | OUTPATIENT
Start: 2022-04-14 | End: 2022-04-14

## 2022-04-14 RX ADMIN — IBUPROFEN 400 MG: 100 SUSPENSION ORAL at 14:00

## 2022-04-14 RX ADMIN — BACITRACIN: 500 OINTMENT TOPICAL at 14:29

## 2022-04-14 RX ADMIN — METOCLOPRAMIDE 10 MG: 10 TABLET ORAL at 16:46

## 2022-04-14 RX ADMIN — ACETAMINOPHEN 1000 MG: 500 TABLET ORAL at 06:01

## 2022-04-14 RX ADMIN — IBUPROFEN 400 MG: 100 SUSPENSION ORAL at 20:15

## 2022-04-14 RX ADMIN — PROPOFOL 20 MCG/KG/MIN: 10 INJECTION, EMULSION INTRAVENOUS at 11:00

## 2022-04-14 RX ADMIN — IBUPROFEN 400 MG: 100 SUSPENSION ORAL at 17:30

## 2022-04-14 RX ADMIN — DIAZEPAM 10 MG: 5 TABLET ORAL at 16:52

## 2022-04-14 RX ADMIN — SODIUM CHLORIDE: 9 INJECTION, SOLUTION INTRAVENOUS at 04:38

## 2022-04-14 RX ADMIN — METOCLOPRAMIDE 10 MG: 10 TABLET ORAL at 21:27

## 2022-04-14 RX ADMIN — DOCUSATE SODIUM 50 MG AND SENNOSIDES 8.6 MG 1 TABLET: 8.6; 5 TABLET, FILM COATED ORAL at 20:08

## 2022-04-14 RX ADMIN — GABAPENTIN 300 MG: 600 TABLET ORAL at 04:32

## 2022-04-14 RX ADMIN — QUETIAPINE FUMARATE 100 MG: 100 TABLET ORAL at 09:33

## 2022-04-14 RX ADMIN — DIAZEPAM 10 MG: 5 TABLET ORAL at 11:00

## 2022-04-14 RX ADMIN — SODIUM CHLORIDE, PRESERVATIVE FREE 10 ML: 5 INJECTION INTRAVENOUS at 20:10

## 2022-04-14 RX ADMIN — POTASSIUM PHOSPHATE, MONOBASIC AND POTASSIUM PHOSPHATE, DIBASIC 30 MMOL: 224; 236 INJECTION, SOLUTION, CONCENTRATE INTRAVENOUS at 12:42

## 2022-04-14 RX ADMIN — FAMOTIDINE 20 MG: 20 TABLET, FILM COATED ORAL at 09:33

## 2022-04-14 RX ADMIN — Medication 500 MG: at 09:33

## 2022-04-14 RX ADMIN — SODIUM CHLORIDE 500 ML: 9 INJECTION, SOLUTION INTRAVENOUS at 04:37

## 2022-04-14 RX ADMIN — FENTANYL CITRATE 100 MCG: 50 INJECTION, SOLUTION INTRAMUSCULAR; INTRAVENOUS at 11:15

## 2022-04-14 RX ADMIN — SODIUM CHLORIDE: 9 INJECTION, SOLUTION INTRAVENOUS at 18:46

## 2022-04-14 RX ADMIN — BACITRACIN: 500 OINTMENT TOPICAL at 09:33

## 2022-04-14 RX ADMIN — GABAPENTIN 300 MG: 600 TABLET ORAL at 13:30

## 2022-04-14 RX ADMIN — Medication 10 MG: at 14:29

## 2022-04-14 RX ADMIN — QUETIAPINE FUMARATE 100 MG: 100 TABLET ORAL at 20:08

## 2022-04-14 RX ADMIN — MAGNESIUM SULFATE 4000 MG: 2 INJECTION INTRAVENOUS at 09:34

## 2022-04-14 RX ADMIN — SODIUM CHLORIDE, PRESERVATIVE FREE 10 ML: 5 INJECTION INTRAVENOUS at 20:09

## 2022-04-14 RX ADMIN — ENOXAPARIN SODIUM 30 MG: 100 INJECTION SUBCUTANEOUS at 20:07

## 2022-04-14 RX ADMIN — IBUPROFEN 400 MG: 100 SUSPENSION ORAL at 04:31

## 2022-04-14 RX ADMIN — ACETAMINOPHEN 1000 MG: 500 TABLET ORAL at 14:29

## 2022-04-14 RX ADMIN — FAMOTIDINE 20 MG: 20 TABLET, FILM COATED ORAL at 20:08

## 2022-04-14 RX ADMIN — PROPOFOL 5 MCG/KG/MIN: 10 INJECTION, EMULSION INTRAVENOUS at 02:46

## 2022-04-14 RX ADMIN — FOLIC ACID 1 MG: 1 TABLET ORAL at 09:34

## 2022-04-14 RX ADMIN — DOCUSATE SODIUM 50 MG AND SENNOSIDES 8.6 MG 1 TABLET: 8.6; 5 TABLET, FILM COATED ORAL at 09:33

## 2022-04-14 RX ADMIN — DIAZEPAM 10 MG: 5 TABLET ORAL at 04:31

## 2022-04-14 RX ADMIN — POLYETHYLENE GLYCOL 3350 17 G: 17 POWDER, FOR SOLUTION ORAL at 09:00

## 2022-04-14 RX ADMIN — METOCLOPRAMIDE 10 MG: 10 TABLET ORAL at 04:31

## 2022-04-14 RX ADMIN — PROPOFOL 20 MCG/KG/MIN: 10 INJECTION, EMULSION INTRAVENOUS at 16:47

## 2022-04-14 RX ADMIN — ENOXAPARIN SODIUM 30 MG: 100 INJECTION SUBCUTANEOUS at 09:30

## 2022-04-14 RX ADMIN — Medication 50 MCG/HR: at 16:22

## 2022-04-14 RX ADMIN — BACITRACIN: 500 OINTMENT TOPICAL at 20:09

## 2022-04-14 RX ADMIN — GABAPENTIN 300 MG: 600 TABLET ORAL at 20:08

## 2022-04-14 RX ADMIN — ERYTHROMYCIN: 5 OINTMENT OPHTHALMIC at 20:08

## 2022-04-14 RX ADMIN — DIAZEPAM 10 MG: 5 TABLET ORAL at 21:28

## 2022-04-14 RX ADMIN — SODIUM CHLORIDE 500 ML: 9 INJECTION, SOLUTION INTRAVENOUS at 02:45

## 2022-04-14 RX ADMIN — ACETAMINOPHEN 1000 MG: 500 TABLET ORAL at 21:28

## 2022-04-14 ASSESSMENT — PULMONARY FUNCTION TESTS
PIF_VALUE: 18
PIF_VALUE: 12
PIF_VALUE: 12
PIF_VALUE: 15
PIF_VALUE: 17
PIF_VALUE: 12
PIF_VALUE: 13
PIF_VALUE: 8
PIF_VALUE: 20
PIF_VALUE: 12
PIF_VALUE: 14
PIF_VALUE: 21
PIF_VALUE: 13
PIF_VALUE: 21
PIF_VALUE: 21
PIF_VALUE: 15
PIF_VALUE: 21
PIF_VALUE: 16
PIF_VALUE: 18
PIF_VALUE: 14
PIF_VALUE: 21
PIF_VALUE: 14
PIF_VALUE: 20
PIF_VALUE: 20
PIF_VALUE: 15
PIF_VALUE: 22
PIF_VALUE: 15
PIF_VALUE: 13
PIF_VALUE: 17
PIF_VALUE: 20
PIF_VALUE: 14
PIF_VALUE: 21
PIF_VALUE: 15
PIF_VALUE: 15
PIF_VALUE: 11
PIF_VALUE: 21
PIF_VALUE: 21
PIF_VALUE: 20
PIF_VALUE: 21
PIF_VALUE: 21
PIF_VALUE: 18
PIF_VALUE: 12
PIF_VALUE: 19
PIF_VALUE: 18
PIF_VALUE: 21
PIF_VALUE: 20
PIF_VALUE: 19
PIF_VALUE: 20
PIF_VALUE: 15
PIF_VALUE: 15
PIF_VALUE: 21
PIF_VALUE: 14
PIF_VALUE: 21
PIF_VALUE: 12
PIF_VALUE: 15
PIF_VALUE: 13
PIF_VALUE: 21
PIF_VALUE: 12
PIF_VALUE: 16
PIF_VALUE: 21
PIF_VALUE: 14
PIF_VALUE: 14
PIF_VALUE: 8
PIF_VALUE: 11
PIF_VALUE: 15
PIF_VALUE: 14
PIF_VALUE: 20
PIF_VALUE: 20
PIF_VALUE: 21
PIF_VALUE: 14
PIF_VALUE: 21
PIF_VALUE: 20
PIF_VALUE: 20
PIF_VALUE: 21
PIF_VALUE: 9
PIF_VALUE: 21
PIF_VALUE: 13
PIF_VALUE: 20
PIF_VALUE: 20
PIF_VALUE: 16
PIF_VALUE: 17
PIF_VALUE: 21
PIF_VALUE: 20
PIF_VALUE: 15
PIF_VALUE: 13
PIF_VALUE: 11
PIF_VALUE: 21

## 2022-04-14 ASSESSMENT — PAIN SCALES - GENERAL
PAINLEVEL_OUTOF10: 0

## 2022-04-14 NOTE — PROCEDURES
Arterial Line Placement Procedure Note    DATE: 4/11/2022  RE: Mckinley Volga   1995    PREOPERATIVE DIAGNOSIS:  Hypotension    POSTOPERATIVE DIAGNOSIS:  Hypotension    INDICATION:  Need or invasive blood pressure monitoring. OPERATION PERFORMED:  Placement of a 20 Gauge  Arterial line in left radial artery     Performed by: Marii Carl DO    ASSISTANT(S):      ANESTHESIA:  None    Procedure: Sterile technique was initiated (hand washing, gown, cap, mask, gloves, draping) before the skin over the left radial artery was prepped with Chloraprep. After skin infiltration with 1% plain lidocaine, the vessel was identified with a 20 Ga. introducer needle and a guide wire was placed without difficulty. Then, a 18 Ga. catheter was easily threaded. Good waveform obtained on monitor and line withdrew and flushed well. A-line was secured with skin sutures, and dressed.     Complications: none    Marii Carl DO  12:44 PM

## 2022-04-14 NOTE — PROGRESS NOTES
Comprehensive Nutrition Assessment    Type and Reason for Visit:  Reassess,Consult (TF recommendations only- Provider to manage)    Nutrition Recommendations/Plan: Continue Current Tube Feeding as tolerated; agree with goal rate of 55 mL/hr as ordered by MD. Will continue to monitor TF tolerance/adequacy. Nutrition Assessment:  Pt remains on vent. Immune Enhancing TF started at 25 mL/hr yesterday; to advance to goal rate today (currently at 35 mL/hr and tolerating well per RN). RD consulted for TF goal. Meds/labs reviewed. Malnutrition Assessment:  Malnutrition Status:  Insufficient data      Estimated Daily Nutrient Needs:  Energy (kcal):  2200 kcal/day; Weight Used for Energy Requirements:  Current     Protein (g):  120-160 g pro/day; Weight Used for Protein Requirements:  Ideal (1.5-2)        Fluid (ml/day):  Per MD      Nutrition Related Findings:  Meds/labs reviewed. No BM noted since admission      Wounds:  None       Current Nutrition Therapies:    Diet NPO  ADULT TUBE FEEDING; Nasogastric; Immune Enhancing; Continuous; 25; Yes; 10; Q 4 hours; 55; 30; Q 4 hours  Current Tube Feeding (TF) Orders:  · Feeding Route: Nasogastric  · Formula: Immune Enhancing  · Schedule: Continuous currently at 35 mL/hr with goal of 55 mL/hr ordered by MD  · Water Flushes: 30 mL every 4 hrs  · Current TF & Flush Orders Provides: currently at 35 tX=4295 kcal and 79 g pro/day  · Goal TF & Flush Orders Provides: Goal of 55 mL/hr as ordered will provide 1980 kcal and 124 g pro/day    Additional Calorie Sources:   Propofol at 6.4 mL/bs=486 kcal/day    Anthropometric Measures:  · Height: 5' 11\" (180.3 cm)  · Current Body Weight: 235 lb 3.7 oz (106.7 kg)   · Admission Body Weight: 235 lb 3.7 oz (106.7 kg) (4/12, [facility])    · Usual Body Weight:  (UTO)     · Ideal Body Weight: 172 lbs; % Ideal Body Weight 136.8 %   · BMI: 32.8  · BMI Categories: Obese Class 1 (BMI 30.0-34. 9)       Nutrition Diagnosis:   · Inadequate oral intake related to impaired respiratory function as evidenced by NPO or clear liquid status due to medical condition,nutrition support - enteral nutrition    Nutrition Interventions:   Food and/or Nutrient Delivery: Continue Current Tube Feeding as tolerated; agree with goal rate of 55 mL/hr as ordered by MD.  Nutrition Education/Counseling:  No recommendation at this time   Coordination of Nutrition Care:  Continue to monitor while inpatient    Goals:  Obtain >50% of nutrition needs via all sources of intake- progressing towards goal       Nutrition Monitoring and Evaluation:   Behavioral-Environmental Outcomes:  None Identified   Food/Nutrient Intake Outcomes:  Enteral Nutrition Intake/Tolerance  Physical Signs/Symptoms Outcomes:  Biochemical Data,Nutrition Focused Physical Findings,Skin,Weight,Fluid Status or Edema     Discharge Planning:     Too soon to determine     Electronically signed by Jose Luis Alejandro RD, LD on 4/14/22 at 11:14 AM EDT    Contact: [phone]
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
## 2022-04-14 NOTE — PLAN OF CARE
Problem: OXYGENATION/RESPIRATORY FUNCTION  Goal: Patient will maintain patent airway  4/13/2022 2339 by Evelyn Rodriguez RN  Outcome: Ongoing  4/13/2022 2105 by Lissette Santana RCP  Outcome: Ongoing  4/13/2022 1535 by Scout Powell RN  Outcome: Ongoing  4/13/2022 1533 by Scout Powell RN  Outcome: Ongoing  Goal: Patient will achieve/maintain normal respiratory rate/effort  Description: Respiratory rate and effort will be within normal limits for the patient  4/13/2022 2339 by Evelyn Rodriguez RN  Outcome: Ongoing  4/13/2022 2105 by Lissette Santana RCP  Outcome: Ongoing  4/13/2022 1535 by Scout Powell RN  Outcome: Ongoing  4/13/2022 1533 by Scout Powell RN  Outcome: Ongoing     Problem: MECHANICAL VENTILATION  Goal: Patient will maintain patent airway  4/13/2022 1535 by Scout Powell RN  Outcome: Completed  4/13/2022 1533 by Scout Powell RN  Outcome: Ongoing  Goal: Oral health is maintained or improved  4/13/2022 1535 by Scout Powell RN  Outcome: Completed  4/13/2022 1533 by Scout Powell RN  Outcome: Ongoing  Goal: ET tube will be managed safely  4/13/2022 1535 by Scout Powell RN  Outcome: Completed  4/13/2022 1533 by Scout Powell RN  Outcome: Ongoing  Goal: Ability to express needs and understand communication  4/13/2022 1535 by Scout Powell RN  Outcome: Completed  4/13/2022 1533 by Scout Powell RN  Outcome: Ongoing  Goal: Mobility/activity is maintained at optimum level for patient  4/13/2022 1535 by Scout Powell RN  Outcome: Completed  4/13/2022 1533 by Scout Powell RN  Outcome: Ongoing     Problem: SKIN INTEGRITY  Goal: Skin integrity is maintained or improved  4/13/2022 2339 by Evelyn Rodriguez RN  Outcome: Ongoing  4/13/2022 1535 by Scout Powell RN  Outcome: Ongoing  4/13/2022 1533 by Scout Powell RN  Outcome: Ongoing     Problem: Non-Violent Restraints  Goal: Removal from restraints as soon as assessed to be safe  4/13/2022 2339 by John Roca RN  Outcome: Not Met This Shift  4/13/2022 1535 by Gabby Leon RN  Outcome: Ongoing  4/13/2022 1533 by Gabby Leon RN  Outcome: Ongoing  Goal: No harm/injury to patient while restraints in use  4/13/2022 2339 by John Roca RN  Outcome: Ongoing  4/13/2022 1535 by Gabby Leon RN  Outcome: Met This Shift  4/13/2022 1533 by Gabby Leon RN  Outcome: Ongoing  Goal: Patient's dignity will be maintained  4/13/2022 2339 by John Roca RN  Outcome: Ongoing  4/13/2022 1535 by Gabby Leon RN  Outcome: Met This Shift  4/13/2022 1533 by Gabby Leon RN  Outcome: Ongoing     Problem: Skin Integrity:  Goal: Will show no infection signs and symptoms  Description: Will show no infection signs and symptoms  4/13/2022 2339 by John Roca RN  Outcome: Ongoing  4/13/2022 1535 by Gabby Leon RN  Outcome: Ongoing  4/13/2022 1533 by Gabby Leon RN  Outcome: Ongoing  Goal: Absence of new skin breakdown  Description: Absence of new skin breakdown  4/13/2022 2339 by John Roca RN  Outcome: Ongoing  4/13/2022 1535 by Gabby Leon RN  Outcome: Ongoing  4/13/2022 1533 by Gabby Leon RN  Outcome: Ongoing     Problem: Falls - Risk of:  Goal: Will remain free from falls  Description: Will remain free from falls  4/13/2022 2339 by John Roca RN  Outcome: Ongoing  4/13/2022 1535 by Gabby Leon RN  Outcome: Ongoing  4/13/2022 1533 by Gabby Leon RN  Outcome: Ongoing  Goal: Absence of physical injury  Description: Absence of physical injury  4/13/2022 2339 by John Roca RN  Outcome: Ongoing  4/13/2022 1535 by Gabby Leon RN  Outcome: Ongoing  4/13/2022 1533 by Gabby Leon RN  Outcome: Ongoing     Problem: Nutrition  Goal: Optimal nutrition therapy  4/13/2022 2339 by John Roca RN  Outcome: Ongoing  4/13/2022 1535 by Gabby Leon RN  Outcome: Ongoing  4/13/2022 1533 by Gabby Leon RN  Outcome: Ongoing     Problem: Suicide risk  Goal: Provide patient with safe environment  Description: Provide patient with safe environment  4/13/2022 1533 by Shell Sousa RN  Outcome: Completed
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
## 2022-04-14 NOTE — PLAN OF CARE
Problem: OXYGENATION/RESPIRATORY FUNCTION  Goal: Patient will maintain patent airway  Outcome: Ongoing  Goal: Patient will achieve/maintain normal respiratory rate/effort  Description: Respiratory rate and effort will be within normal limits for the patient  Outcome: Ongoing     Problem: Non-Violent Restraints  Goal: Removal from restraints as soon as assessed to be safe  Outcome: Ongoing  Goal: No harm/injury to patient while restraints in use  Outcome: Ongoing  Goal: Patient's dignity will be maintained  Outcome: Ongoing
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
## 2022-04-15 ENCOUNTER — APPOINTMENT (OUTPATIENT)
Dept: GENERAL RADIOLOGY | Age: 27
DRG: 005 | End: 2022-04-15
Payer: MEDICAID

## 2022-04-15 ENCOUNTER — ANESTHESIA EVENT (OUTPATIENT)
Dept: OPERATING ROOM | Age: 27
DRG: 005 | End: 2022-04-15
Payer: MEDICAID

## 2022-04-15 ENCOUNTER — ANESTHESIA (OUTPATIENT)
Dept: OPERATING ROOM | Age: 27
DRG: 005 | End: 2022-04-15
Payer: MEDICAID

## 2022-04-15 VITALS
DIASTOLIC BLOOD PRESSURE: 107 MMHG | SYSTOLIC BLOOD PRESSURE: 126 MMHG | OXYGEN SATURATION: 100 % | TEMPERATURE: 95.2 F | RESPIRATION RATE: 12 BRPM

## 2022-04-15 LAB
ALLEN TEST: ABNORMAL
ALLEN TEST: NORMAL
ANION GAP SERPL CALCULATED.3IONS-SCNC: 7 MMOL/L (ref 9–17)
BUN BLDV-MCNC: 7 MG/DL (ref 6–20)
CALCIUM SERPL-MCNC: 7.9 MG/DL (ref 8.6–10.4)
CHLORIDE BLD-SCNC: 112 MMOL/L (ref 98–107)
CO2: 23 MMOL/L (ref 20–31)
CREAT SERPL-MCNC: 0.71 MG/DL (ref 0.7–1.2)
FIO2: 30
FIO2: 30
GFR AFRICAN AMERICAN: >60 ML/MIN
GFR NON-AFRICAN AMERICAN: >60 ML/MIN
GFR SERPL CREATININE-BSD FRML MDRD: ABNORMAL ML/MIN/{1.73_M2}
GLUCOSE BLD-MCNC: 106 MG/DL (ref 70–99)
GLUCOSE BLD-MCNC: 116 MG/DL (ref 74–100)
GLUCOSE BLD-MCNC: 93 MG/DL (ref 74–100)
HCT VFR BLD CALC: 31.9 % (ref 40.7–50.3)
HEMOGLOBIN: 10.9 G/DL (ref 13–17)
MAGNESIUM: 1.9 MG/DL (ref 1.6–2.6)
MCH RBC QN AUTO: 33.7 PG (ref 25.2–33.5)
MCHC RBC AUTO-ENTMCNC: 34.2 G/DL (ref 28.4–34.8)
MCV RBC AUTO: 98.8 FL (ref 82.6–102.9)
MODE: ABNORMAL
MODE: NORMAL
NRBC AUTOMATED: 0 PER 100 WBC
O2 DEVICE/FLOW/%: ABNORMAL
O2 DEVICE/FLOW/%: NORMAL
PDW BLD-RTO: 13 % (ref 11.8–14.4)
PHOSPHORUS: 3.1 MG/DL (ref 2.5–4.5)
PLATELET # BLD: 148 K/UL (ref 138–453)
PMV BLD AUTO: 10 FL (ref 8.1–13.5)
POC HCO3: 24.8 MMOL/L (ref 21–28)
POC HCO3: 25.7 MMOL/L (ref 21–28)
POC LACTIC ACID: 0.5 MMOL/L (ref 0.56–1.39)
POC LACTIC ACID: 0.68 MMOL/L (ref 0.56–1.39)
POC O2 SATURATION: 92 % (ref 94–98)
POC O2 SATURATION: 97 % (ref 94–98)
POC PCO2: 41.9 MM HG (ref 35–48)
POC PCO2: 43.8 MM HG (ref 35–48)
POC PH: 7.38 (ref 7.35–7.45)
POC PH: 7.38 (ref 7.35–7.45)
POC PO2: 64.5 MM HG (ref 83–108)
POC PO2: 96.8 MM HG (ref 83–108)
POSITIVE BASE EXCESS, ART: 0 (ref 0–3)
POSITIVE BASE EXCESS, ART: 0 (ref 0–3)
POTASSIUM SERPL-SCNC: 3.9 MMOL/L (ref 3.7–5.3)
RBC # BLD: 3.23 M/UL (ref 4.21–5.77)
SAMPLE SITE: ABNORMAL
SAMPLE SITE: NORMAL
SODIUM BLD-SCNC: 142 MMOL/L (ref 135–144)
WBC # BLD: 4.9 K/UL (ref 3.5–11.3)

## 2022-04-15 PROCEDURE — 83605 ASSAY OF LACTIC ACID: CPT

## 2022-04-15 PROCEDURE — 6370000000 HC RX 637 (ALT 250 FOR IP): Performed by: EMERGENCY MEDICINE

## 2022-04-15 PROCEDURE — 2580000003 HC RX 258: Performed by: STUDENT IN AN ORGANIZED HEALTH CARE EDUCATION/TRAINING PROGRAM

## 2022-04-15 PROCEDURE — 6360000002 HC RX W HCPCS: Performed by: SURGERY

## 2022-04-15 PROCEDURE — 71045 X-RAY EXAM CHEST 1 VIEW: CPT

## 2022-04-15 PROCEDURE — 0B110F4 BYPASS TRACHEA TO CUTANEOUS WITH TRACHEOSTOMY DEVICE, OPEN APPROACH: ICD-10-PCS | Performed by: SURGERY

## 2022-04-15 PROCEDURE — 6370000000 HC RX 637 (ALT 250 FOR IP): Performed by: STUDENT IN AN ORGANIZED HEALTH CARE EDUCATION/TRAINING PROGRAM

## 2022-04-15 PROCEDURE — 2500000003 HC RX 250 WO HCPCS

## 2022-04-15 PROCEDURE — 2580000003 HC RX 258: Performed by: SURGERY

## 2022-04-15 PROCEDURE — 3700000000 HC ANESTHESIA ATTENDED CARE: Performed by: SURGERY

## 2022-04-15 PROCEDURE — 2500000003 HC RX 250 WO HCPCS: Performed by: STUDENT IN AN ORGANIZED HEALTH CARE EDUCATION/TRAINING PROGRAM

## 2022-04-15 PROCEDURE — 83735 ASSAY OF MAGNESIUM: CPT

## 2022-04-15 PROCEDURE — 2000000000 HC ICU R&B

## 2022-04-15 PROCEDURE — 94003 VENT MGMT INPAT SUBQ DAY: CPT

## 2022-04-15 PROCEDURE — 2580000003 HC RX 258: Performed by: EMERGENCY MEDICINE

## 2022-04-15 PROCEDURE — 3600000030 HC TRACHEOSTOMY: Performed by: SURGERY

## 2022-04-15 PROCEDURE — 0DH63UZ INSERTION OF FEEDING DEVICE INTO STOMACH, PERCUTANEOUS APPROACH: ICD-10-PCS | Performed by: SURGERY

## 2022-04-15 PROCEDURE — 80048 BASIC METABOLIC PNL TOTAL CA: CPT

## 2022-04-15 PROCEDURE — 82947 ASSAY GLUCOSE BLOOD QUANT: CPT

## 2022-04-15 PROCEDURE — 82803 BLOOD GASES ANY COMBINATION: CPT

## 2022-04-15 PROCEDURE — 3700000001 HC ADD 15 MINUTES (ANESTHESIA): Performed by: SURGERY

## 2022-04-15 PROCEDURE — 2720000010 HC SURG SUPPLY STERILE: Performed by: SURGERY

## 2022-04-15 PROCEDURE — 36600 WITHDRAWAL OF ARTERIAL BLOOD: CPT

## 2022-04-15 PROCEDURE — 2700000000 HC OXYGEN THERAPY PER DAY

## 2022-04-15 PROCEDURE — 36620 INSERTION CATHETER ARTERY: CPT

## 2022-04-15 PROCEDURE — 6360000002 HC RX W HCPCS

## 2022-04-15 PROCEDURE — 6360000002 HC RX W HCPCS: Performed by: EMERGENCY MEDICINE

## 2022-04-15 PROCEDURE — 84100 ASSAY OF PHOSPHORUS: CPT

## 2022-04-15 PROCEDURE — 2709999900 HC NON-CHARGEABLE SUPPLY: Performed by: SURGERY

## 2022-04-15 PROCEDURE — 94761 N-INVAS EAR/PLS OXIMETRY MLT: CPT

## 2022-04-15 PROCEDURE — 85027 COMPLETE CBC AUTOMATED: CPT

## 2022-04-15 PROCEDURE — 2580000003 HC RX 258

## 2022-04-15 PROCEDURE — 5A1955Z RESPIRATORY VENTILATION, GREATER THAN 96 CONSECUTIVE HOURS: ICD-10-PCS | Performed by: SURGERY

## 2022-04-15 PROCEDURE — 3609013300 HC EGD TUBE PLACEMENT: Performed by: SURGERY

## 2022-04-15 RX ORDER — SODIUM CHLORIDE, SODIUM LACTATE, POTASSIUM CHLORIDE, CALCIUM CHLORIDE 600; 310; 30; 20 MG/100ML; MG/100ML; MG/100ML; MG/100ML
INJECTION, SOLUTION INTRAVENOUS CONTINUOUS PRN
Status: DISCONTINUED | OUTPATIENT
Start: 2022-04-15 | End: 2022-04-15 | Stop reason: SDUPTHER

## 2022-04-15 RX ORDER — FENTANYL CITRATE 50 UG/ML
INJECTION, SOLUTION INTRAMUSCULAR; INTRAVENOUS PRN
Status: DISCONTINUED | OUTPATIENT
Start: 2022-04-15 | End: 2022-04-15 | Stop reason: SDUPTHER

## 2022-04-15 RX ORDER — MIDAZOLAM HYDROCHLORIDE 1 MG/ML
INJECTION INTRAMUSCULAR; INTRAVENOUS PRN
Status: DISCONTINUED | OUTPATIENT
Start: 2022-04-15 | End: 2022-04-15 | Stop reason: SDUPTHER

## 2022-04-15 RX ORDER — ROCURONIUM BROMIDE 10 MG/ML
INJECTION, SOLUTION INTRAVENOUS PRN
Status: DISCONTINUED | OUTPATIENT
Start: 2022-04-15 | End: 2022-04-15 | Stop reason: SDUPTHER

## 2022-04-15 RX ORDER — OXYCODONE HYDROCHLORIDE 5 MG/1
5 TABLET ORAL EVERY 6 HOURS
Status: DISCONTINUED | OUTPATIENT
Start: 2022-04-15 | End: 2022-04-23

## 2022-04-15 RX ORDER — MAGNESIUM HYDROXIDE 1200 MG/15ML
LIQUID ORAL CONTINUOUS PRN
Status: COMPLETED | OUTPATIENT
Start: 2022-04-15 | End: 2022-04-15

## 2022-04-15 RX ADMIN — ACETAMINOPHEN 1000 MG: 500 TABLET ORAL at 22:12

## 2022-04-15 RX ADMIN — IBUPROFEN 400 MG: 100 SUSPENSION ORAL at 01:34

## 2022-04-15 RX ADMIN — ENOXAPARIN SODIUM 30 MG: 100 INJECTION SUBCUTANEOUS at 22:12

## 2022-04-15 RX ADMIN — ROCURONIUM BROMIDE 50 MG: 10 INJECTION INTRAVENOUS at 13:44

## 2022-04-15 RX ADMIN — OXYCODONE HYDROCHLORIDE 5 MG: 5 TABLET ORAL at 20:34

## 2022-04-15 RX ADMIN — IBUPROFEN 400 MG: 100 SUSPENSION ORAL at 04:11

## 2022-04-15 RX ADMIN — FENTANYL CITRATE 50 MCG: 50 INJECTION, SOLUTION INTRAMUSCULAR; INTRAVENOUS at 14:33

## 2022-04-15 RX ADMIN — BACITRACIN: 500 OINTMENT TOPICAL at 08:17

## 2022-04-15 RX ADMIN — SODIUM CHLORIDE, POTASSIUM CHLORIDE, SODIUM LACTATE AND CALCIUM CHLORIDE: 600; 310; 30; 20 INJECTION, SOLUTION INTRAVENOUS at 13:31

## 2022-04-15 RX ADMIN — DOCUSATE SODIUM 50 MG AND SENNOSIDES 8.6 MG 1 TABLET: 8.6; 5 TABLET, FILM COATED ORAL at 08:19

## 2022-04-15 RX ADMIN — GABAPENTIN 300 MG: 600 TABLET ORAL at 20:33

## 2022-04-15 RX ADMIN — METOCLOPRAMIDE 10 MG: 10 TABLET ORAL at 22:11

## 2022-04-15 RX ADMIN — PROPOFOL 30 MCG/KG/MIN: 10 INJECTION, EMULSION INTRAVENOUS at 18:59

## 2022-04-15 RX ADMIN — GABAPENTIN 300 MG: 600 TABLET ORAL at 15:54

## 2022-04-15 RX ADMIN — PROPOFOL 20 MCG/KG/MIN: 10 INJECTION, EMULSION INTRAVENOUS at 00:34

## 2022-04-15 RX ADMIN — PROPOFOL 20 MCG/KG/MIN: 10 INJECTION, EMULSION INTRAVENOUS at 13:24

## 2022-04-15 RX ADMIN — IBUPROFEN 400 MG: 100 SUSPENSION ORAL at 18:44

## 2022-04-15 RX ADMIN — ROCURONIUM BROMIDE 30 MG: 10 INJECTION INTRAVENOUS at 14:41

## 2022-04-15 RX ADMIN — DIAZEPAM 10 MG: 5 TABLET ORAL at 10:25

## 2022-04-15 RX ADMIN — SODIUM CHLORIDE: 9 INJECTION, SOLUTION INTRAVENOUS at 18:25

## 2022-04-15 RX ADMIN — Medication 2 G: at 13:52

## 2022-04-15 RX ADMIN — BACITRACIN: 500 OINTMENT TOPICAL at 20:34

## 2022-04-15 RX ADMIN — ROCURONIUM BROMIDE 20 MG: 10 INJECTION INTRAVENOUS at 15:06

## 2022-04-15 RX ADMIN — ACETAMINOPHEN 1000 MG: 500 TABLET ORAL at 18:26

## 2022-04-15 RX ADMIN — IBUPROFEN 400 MG: 400 TABLET, FILM COATED ORAL at 20:33

## 2022-04-15 RX ADMIN — POLYETHYLENE GLYCOL 3350 17 G: 17 POWDER, FOR SOLUTION ORAL at 08:19

## 2022-04-15 RX ADMIN — METOCLOPRAMIDE 10 MG: 10 TABLET ORAL at 10:24

## 2022-04-15 RX ADMIN — SODIUM CHLORIDE: 9 INJECTION, SOLUTION INTRAVENOUS at 15:37

## 2022-04-15 RX ADMIN — GABAPENTIN 300 MG: 600 TABLET ORAL at 04:13

## 2022-04-15 RX ADMIN — QUETIAPINE FUMARATE 100 MG: 100 TABLET ORAL at 08:18

## 2022-04-15 RX ADMIN — PROPOFOL 20 MCG/KG/MIN: 10 INJECTION, EMULSION INTRAVENOUS at 05:19

## 2022-04-15 RX ADMIN — Medication 75 MCG/HR: at 05:18

## 2022-04-15 RX ADMIN — MIDAZOLAM HYDROCHLORIDE 2 MG: 1 INJECTION, SOLUTION INTRAMUSCULAR; INTRAVENOUS at 13:39

## 2022-04-15 RX ADMIN — ROCURONIUM BROMIDE 50 MG: 10 INJECTION INTRAVENOUS at 13:34

## 2022-04-15 RX ADMIN — FENTANYL CITRATE 50 MCG: 50 INJECTION, SOLUTION INTRAMUSCULAR; INTRAVENOUS at 14:04

## 2022-04-15 RX ADMIN — IBUPROFEN 400 MG: 100 SUSPENSION ORAL at 08:18

## 2022-04-15 RX ADMIN — QUETIAPINE FUMARATE 100 MG: 100 TABLET ORAL at 20:33

## 2022-04-15 RX ADMIN — FAMOTIDINE 20 MG: 20 TABLET, FILM COATED ORAL at 08:19

## 2022-04-15 RX ADMIN — ACETAMINOPHEN 1000 MG: 500 TABLET ORAL at 05:19

## 2022-04-15 RX ADMIN — DIAZEPAM 10 MG: 5 TABLET ORAL at 04:11

## 2022-04-15 RX ADMIN — DOCUSATE SODIUM 50 MG AND SENNOSIDES 8.6 MG 1 TABLET: 8.6; 5 TABLET, FILM COATED ORAL at 20:33

## 2022-04-15 RX ADMIN — SODIUM CHLORIDE: 9 INJECTION, SOLUTION INTRAVENOUS at 05:33

## 2022-04-15 RX ADMIN — FAMOTIDINE 20 MG: 20 TABLET, FILM COATED ORAL at 20:33

## 2022-04-15 RX ADMIN — ERYTHROMYCIN: 5 OINTMENT OPHTHALMIC at 22:12

## 2022-04-15 RX ADMIN — SODIUM CHLORIDE, PRESERVATIVE FREE 10 ML: 5 INJECTION INTRAVENOUS at 20:34

## 2022-04-15 RX ADMIN — METOCLOPRAMIDE 10 MG: 10 TABLET ORAL at 04:13

## 2022-04-15 RX ADMIN — FOLIC ACID 1 MG: 1 TABLET ORAL at 08:18

## 2022-04-15 RX ADMIN — DIAZEPAM 10 MG: 5 TABLET ORAL at 22:12

## 2022-04-15 RX ADMIN — BACITRACIN: 500 OINTMENT TOPICAL at 15:54

## 2022-04-15 RX ADMIN — KETAMINE HYDROCHLORIDE 0.2 MG/KG/HR: 50 INJECTION INTRAMUSCULAR; INTRAVENOUS at 06:48

## 2022-04-15 RX ADMIN — DIAZEPAM 10 MG: 5 TABLET ORAL at 18:28

## 2022-04-15 ASSESSMENT — PULMONARY FUNCTION TESTS
PIF_VALUE: 21
PIF_VALUE: 22
PIF_VALUE: 19
PIF_VALUE: 22
PIF_VALUE: 20
PIF_VALUE: 24
PIF_VALUE: 19
PIF_VALUE: 16
PIF_VALUE: 23
PIF_VALUE: 19
PIF_VALUE: 18
PIF_VALUE: 23
PIF_VALUE: 20
PIF_VALUE: 20
PIF_VALUE: 21
PIF_VALUE: 20
PIF_VALUE: 23
PIF_VALUE: 20
PIF_VALUE: 20
PIF_VALUE: 22
PIF_VALUE: 23
PIF_VALUE: 21
PIF_VALUE: 23
PIF_VALUE: 21
PIF_VALUE: 20
PIF_VALUE: 20
PIF_VALUE: 19
PIF_VALUE: 22
PIF_VALUE: 19
PIF_VALUE: 19
PIF_VALUE: 22
PIF_VALUE: 21
PIF_VALUE: 20
PIF_VALUE: 23
PIF_VALUE: 19
PIF_VALUE: 22
PIF_VALUE: 20
PIF_VALUE: 16
PIF_VALUE: 20
PIF_VALUE: 18
PIF_VALUE: 19
PIF_VALUE: 22
PIF_VALUE: 22
PIF_VALUE: 23
PIF_VALUE: 20
PIF_VALUE: 19
PIF_VALUE: 20
PIF_VALUE: 21
PIF_VALUE: 25
PIF_VALUE: 20
PIF_VALUE: 20
PIF_VALUE: 21
PIF_VALUE: 20
PIF_VALUE: 21
PIF_VALUE: 20
PIF_VALUE: 20
PIF_VALUE: 19
PIF_VALUE: 20
PIF_VALUE: 26
PIF_VALUE: 20
PIF_VALUE: 20
PIF_VALUE: 6
PIF_VALUE: 23
PIF_VALUE: 20
PIF_VALUE: 19
PIF_VALUE: 20
PIF_VALUE: 21
PIF_VALUE: 19
PIF_VALUE: 20
PIF_VALUE: 23
PIF_VALUE: 20
PIF_VALUE: 17
PIF_VALUE: 20
PIF_VALUE: 20
PIF_VALUE: 19
PIF_VALUE: 25
PIF_VALUE: 20
PIF_VALUE: 7
PIF_VALUE: 21
PIF_VALUE: 21
PIF_VALUE: 24
PIF_VALUE: 25
PIF_VALUE: 20
PIF_VALUE: 20
PIF_VALUE: 16
PIF_VALUE: 17
PIF_VALUE: 21
PIF_VALUE: 19
PIF_VALUE: 20
PIF_VALUE: 21
PIF_VALUE: 18
PIF_VALUE: 20
PIF_VALUE: 21
PIF_VALUE: 16
PIF_VALUE: 16
PIF_VALUE: 22
PIF_VALUE: 20
PIF_VALUE: 16
PIF_VALUE: 23
PIF_VALUE: 22
PIF_VALUE: 20
PIF_VALUE: 20
PIF_VALUE: 19
PIF_VALUE: 19
PIF_VALUE: 20
PIF_VALUE: 17
PIF_VALUE: 22
PIF_VALUE: 21
PIF_VALUE: 22

## 2022-04-15 NOTE — PLAN OF CARE
Problem: OXYGENATION/RESPIRATORY FUNCTION  Goal: Patient will maintain patent airway  4/14/2022 2145 by Amelie Mirza RN  Outcome: Ongoing  4/14/2022 1559 by Olivia Finn RN  Outcome: Ongoing  Goal: Patient will achieve/maintain normal respiratory rate/effort  Description: Respiratory rate and effort will be within normal limits for the patient  4/14/2022 2145 by Amelie Mirza RN  Outcome: Ongoing  4/14/2022 1559 by Olivia Finn RN  Outcome: Ongoing     Problem: SKIN INTEGRITY  Goal: Skin integrity is maintained or improved  4/14/2022 2145 by Amelie Mirza RN  Outcome: Ongoing  4/14/2022 1559 by Olivia Finn RN  Outcome: Ongoing     Problem: Non-Violent Restraints  Goal: Removal from restraints as soon as assessed to be safe  4/14/2022 2145 by Amelie Mirza RN  Outcome: Not Met This Shift  4/14/2022 1559 by Olivia Finn RN  Outcome: Ongoing  Goal: No harm/injury to patient while restraints in use  4/14/2022 2145 by Amelie Mirza RN  Outcome: Ongoing  4/14/2022 1559 by Olivia Finn RN  Outcome: Ongoing  Goal: Patient's dignity will be maintained  4/14/2022 2145 by Amelie Mirza RN  Outcome: Ongoing  4/14/2022 1559 by Olivia Finn RN  Outcome: Ongoing     Problem: Skin Integrity:  Goal: Will show no infection signs and symptoms  Description: Will show no infection signs and symptoms  4/14/2022 2145 by Amelie Mirza RN  Outcome: Ongoing  4/14/2022 1559 by Olivia Finn RN  Outcome: Ongoing  Goal: Absence of new skin breakdown  Description: Absence of new skin breakdown  4/14/2022 2145 by Amelie Mirza RN  Outcome: Ongoing  4/14/2022 1559 by Olivia Finn RN  Outcome: Ongoing     Problem: Falls - Risk of:  Goal: Will remain free from falls  Description: Will remain free from falls  4/14/2022 2145 by Amelie Mirza RN  Outcome: Ongoing  4/14/2022 1559 by Olivia Finn RN  Outcome: Ongoing  Goal: Absence of physical injury  Description: Absence of physical injury  4/14/2022 2145 by Amelie Mirza RN  Outcome: Ongoing  4/14/2022 1559 by Jacob Dawson RN  Outcome: Ongoing     Problem: Nutrition  Goal: Optimal nutrition therapy  4/14/2022 2145 by Oscar Vickers RN  Outcome: Ongoing  4/14/2022 1559 by Jacob Dawson RN  Outcome: Ongoing  4/14/2022 1117 by Tammy Britt RD, LD  Outcome: Ongoing  Note: Nutrition Problem #1: Inadequate oral intake  Intervention: Food and/or Nutrient Delivery:  (Continue Current Tube Feeding as tolerated; agree with goal rate of 55 mL/hr as ordered by MD.)  Nutritional Goals: Obtain >50% of nutrition needs via all sources of intake

## 2022-04-15 NOTE — ANESTHESIA POSTPROCEDURE EVALUATION
Department of Anesthesiology  Postprocedure Note    Patient: Ankur Stallings  MRN: [id number]  [place]: 1995  Date of evaluation: 4/15/2022  Time:  6:38 PM     Procedure Summary     Date: 04/15/22 Room / Location: [address]    Anesthesia Start: 4627 Anesthesia Stop: 2575    Procedures:       TRACHEOSTOMY (N/A )      EGD ESOPHAGOGASTRODUODENOSCOPY PEG TUBE INSERTION WITH GI (N/A ) Diagnosis: (RESPIRATORY FAILURE/DYSPHAGIA)    Surgeons: Theresa Pablo MD Responsible Provider: Loli Sparks MD    Anesthesia Type: general ASA Status: 3          Anesthesia Type: general    Jass Phase I:      Jass Phase II:      Last vitals: Reviewed and per EMR flowsheets.        Anesthesia Post Evaluation    Patient location during evaluation: ICU  Patient participation: complete - patient cannot participate  Level of consciousness: sedated and ventilated  Cardiovascular status: hemodynamically stable  Respiratory status: ventilator  Multimodal analgesia pain management approach

## 2022-04-15 NOTE — PROGRESS NOTES
POST OP NOTE    SUBJECTIVE  Pt s/p trach and peg placement. OBJECTIVE  VITALS:  BP (!) 139/94   Pulse (!) 46   Temp 97.7 °F (36.5 °C) (Oral)   Resp 16   Ht 5' 11\" (1.803 m)   Wt 235 lb 3.7 oz (106.7 kg)   SpO2 100%   BMI 32.81 kg/m²         GENERAL:  sedated and trached, somnolent. No acute distress  CARDIOVASCULAR:  regular rate and rhythm   LUNGS:  CTA Bilaterally  ABDOMEN:   Abdomen soft, non-tender, non-distended, peg in place  INCISION: Incision clean/dry/intact    ASSESSMENT  1. POD# 0 s/p trach and peg placement    PLAN  Continue management per previous progress note. Remain in ICU until mentation improves without significant agitation.       Lizbet Aguilar MD  Trauma/Surgery Service  4/15/2022 at 4:58 PM

## 2022-04-15 NOTE — PROCEDURES
Insert Arterial Line  Date/Time:  04/15/22, 5:40 PM  Performed by: Claire Helm RCP    Patient identity confirmed: arm band and provided demographic data   Time out: Immediately prior to procedure a \"time out\" was called to verify the correct patient, procedure, equipment, support staff. Preparation: Patient was prepped and draped in the usual sterile fashion.     Location:left radial    Oren's test normal: yes  Needle gauge: 20     Number of attempts: 1  Post-procedure: transparent dressing applied and line secured    Patient tolerance: well

## 2022-04-15 NOTE — PROGRESS NOTES
Physical Therapy        Physical Therapy Cancel Note      DATE: 4/15/2022    NAME: Mona Hinojosa  MRN: [id number]   : 1995      Patient not seen this date for Physical Therapy due to:    Surgery/Procedure: OR today for trach. PT will check back post-op.       Electronically signed by Efren Helm PT on 4/15/2022 at 2:49 PM

## 2022-04-15 NOTE — PLAN OF CARE
Problem: OXYGENATION/RESPIRATORY FUNCTION  Goal: Patient will maintain patent airway  4/15/2022 0243 by Gilbert Cooper RCP  Outcome: Ongoing  4/15/2022 0242 by Gilbert Cooper RCP  Outcome: Ongoing  4/14/2022 2145 by Inés Sanders RN  Outcome: Ongoing  4/14/2022 1559 by Lacey Alonzo RN  Outcome: Ongoing  Goal: Patient will achieve/maintain normal respiratory rate/effort  Description: Respiratory rate and effort will be within normal limits for the patient  4/15/2022 0243 by Gilbert Cooper RCP  Outcome: Ongoing  4/15/2022 0242 by Gilbert Cooper RCP  Outcome: Ongoing  4/14/2022 2145 by Inés Sanders RN  Outcome: Ongoing  4/14/2022 1559 by Lacey Alonzo RN  Outcome: Ongoing     Problem: SKIN INTEGRITY  Goal: Skin integrity is maintained or improved  4/15/2022 0243 by Gilbert Cooper RCP  Outcome: Ongoing  4/14/2022 2145 by Inés Sanders RN  Outcome: Ongoing  4/14/2022 1559 by Lacey Alonzo RN  Outcome: Ongoing     Problem: MECHANICAL VENTILATION  Goal: Patient will maintain patent airway  4/15/2022 0243 by Gilbert Cooper RCP  Outcome: Ongoing  4/15/2022 0242 by Gilbert Cooper RCP  Outcome: Ongoing  4/14/2022 2145 by Inés Sanders RN  Outcome: Ongoing  4/14/2022 1559 by Lacey Alonzo RN  Outcome: Ongoing  Goal: Oral health is maintained or improved  Outcome: Ongoing  Goal: ET tube will be managed safely  Outcome: Ongoing  Goal: Ability to express needs and understand communication  Outcome: Ongoing  Goal: Mobility/activity is maintained at optimum level for patient  Outcome: Ongoing
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
## 2022-04-15 NOTE — OP NOTE
Operative Note      Patient: Anita Summers  YOB: 1995  MRN: [id number]    Date of Procedure: 4/15/2022    Pre-Op Diagnosis: RESPIRATORY FAILURE/DYSPHAGIA    Post-Op Diagnosis: Same       Procedure(s):  TRACHEOSTOMY      Surgeon(s):  Wild Bautista MD    Assistant:   Resident: Emily Mosquera DO; Remberto Garza DO    Anesthesia: General    Estimated Blood Loss (mL): Minimal    Complications: None    Specimens:   * No specimens in log *    Implants:  * No implants in log *      Drains:   NG/OG/NJ/NE Tube Nasogastric 16 fr Left nostril (Active)   Surrounding Skin Dry; Intact 04/15/22 1200   Securement device Yes 04/15/22 1200   Status Clamped 04/15/22 1200   Placement Verified by Gastric Contents;by X-Ray (Initial);by Respiratory Status;by External Catheter Length 04/15/22 1200   NG/OG/NJ/NE External Measurement (cm) 65 cm 04/15/22 1200   Drainage Appearance Tan 04/15/22 0800   Tube Feeding Immune Enhancing 04/15/22 0400   Tube Feeding Status Stopped 04/15/22 0800   Rate/Schedule 55 mL/hr 04/15/22 0000   Tube Feeding Intake (mL) 167 ml 04/15/22 0400   Free Water Flush (mL) 30 mL 04/15/22 0800   Residual Volume (ml) 0 ml 04/14/22 0000   Output (mL) 0 ml 04/15/22 1200       Urethral Catheter Temperature probe (Active)   Catheter Indications Urinary retention (acute or chronic), continuous bladder irrigation or bladder outlet obstruction 04/15/22 1200   Securement Device Date Changed 04/13/22 04/15/22 1200   Site Assessment No urethral drainage 04/15/22 1200   Urine Color Yellow 04/15/22 1200   Urine Appearance Clear 04/15/22 1200   Urine Odor Malodorous 04/15/22 1200   Output (mL) 400 mL 04/15/22 1215   Provider Notified to Remove Yes 04/15/22 0800       [REMOVED] NG/OG/NJ/NE Tube Orogastric 18 fr Left mouth (Removed)   Surrounding Skin Dry; Intact 04/12/22 1200   Securement device Yes 04/12/22 1200   Status Clamped 04/12/22 1200   Placement Verified by Gastric Contents;by External Catheter Length 04/12/22 1200   NG/OG/NJ/NE External Measurement (cm) 65 cm 04/12/22 1200   Drainage Appearance Brown 04/12/22 1200   Free Water Flush (mL) 30 mL 04/12/22 1200   Output (mL) 500 ml 04/12/22 0522       [REMOVED] Urethral Catheter Temperature probe 16 fr (Removed)   $ Urethral catheter insertion Inserted for procedure 04/12/22 0314   Catheter Indications Need for fluid volume management of the critically ill patient in a critical care setting 04/14/22 1600   Securement Device Date Changed 04/12/22 04/12/22 2000   Site Assessment No urethral drainage 04/14/22 1600   Urine Color Yellow 04/14/22 1600   Urine Appearance Clear 04/14/22 1600   Output (mL) 50 mL 04/14/22 1700       [REMOVED] External Urinary Catheter (Removed)   Catheter changed  Yes 04/14/22 1800   Output (mL) 0 mL 04/15/22 0000   Suction N/A 04/15/22 0000   Skin Assessment No Injury 04/15/22 0000       Findings:     Detailed Description of Procedure:     CONSENT:  Consent was obtained from the patient's family prior to the procedure. Indications, risks, and benefits were explained at length. HISTORY: The patient is a 27y.o. year old male with history of above preop diagnosis. I explained the risk, benefits, expected outcome, and alternatives to the procedure. Risks included but are not limited to bleeding, infection, respiratory distress, hypotension, Family understands and is in agreement. PROCEDURE: The pt was brought to the OR. A time out was made to verify correct pt and procedure type. The patient was given sedation per anesthesia. The neck was then prepped and draped in a sterile fashion. Attention was directed at the midline trachea, where the cricothyroid membrane was palpated. Approximately two fingerbreadths above the sternal notch, a midline vertical incision was created with a scalpel. Electric cautery was then used to dissect and maintain hemostasis. Blunt dissection was then performed to the subcutaneous tissue with hemostats.  The anterior trachea was visualized with the tracheal rings. A trach hook was inserted into the 1st tracheal ring and the trachea was lifted upward to provide visualization. The third tracheal ring was identified and an incision was made into the trachea with a #15 scalpel. The 3rd tracheal ring was cut out. The ETT was visualized as it was pulled out and the #8 tracheostomy tube was inserted. The obturator was removed and the inner cannula was placed and connected to the vent. Anesthesia confirmed end tidal.  2-0 prolene was used to suture the incision site in an interrupted fashion. The tracheostomy was then secured at the anterior neck with 2-0 prolene x4. Adequate tidal volumes were noted. The cuff was inflated and no evidence of air leak was noted. No evidence of bleeding was noted. At this point, the procedure was concluded and the patient tolerated the procedure well. A stat CXR will be ordered and reviewed. Dr. Gabriele Tobias was present throughout the procedure and all instruments were accounted for.         Electronically signed by Anita Panchal DO on 4/15/2022 at 3:54 PM

## 2022-04-15 NOTE — BRIEF OP NOTE
Brief Postoperative Note      Patient: Elias Ramirez  YOB: 1995  MRN: [id number]    Date of Procedure: 4/15/2022    Pre-Op Diagnosis: RESPIRATORY FAILURE/DYSPHAGIA    Post-Op Diagnosis: Same       Procedure(s):  TRACHEOSTOMY  EGD ESOPHAGOGASTRODUODENOSCOPY PEG TUBE INSERTION WITH GI    Surgeon(s):  Andre Redmond MD    Assistant:  Resident: Erlinda Yang DO; Trista Nye DO    Anesthesia: General    Estimated Blood Loss (mL): Minimal    Complications: None    Specimens:   * No specimens in log *    Implants:  * No implants in log *      Drains:   NG/OG/NJ/NE Tube Nasogastric 16 fr Left nostril (Active)   Surrounding Skin Dry; Intact 04/15/22 1200   Securement device Yes 04/15/22 1200   Status Clamped 04/15/22 1200   Placement Verified by Gastric Contents;by X-Ray (Initial);by Respiratory Status;by External Catheter Length 04/15/22 1200   NG/OG/NJ/NE External Measurement (cm) 65 cm 04/15/22 1200   Drainage Appearance Tan 04/15/22 0800   Tube Feeding Immune Enhancing 04/15/22 0400   Tube Feeding Status Stopped 04/15/22 0800   Rate/Schedule 55 mL/hr 04/15/22 0000   Tube Feeding Intake (mL) 167 ml 04/15/22 0400   Free Water Flush (mL) 30 mL 04/15/22 0800   Residual Volume (ml) 0 ml 04/14/22 0000   Output (mL) 0 ml 04/15/22 1200       Urethral Catheter Temperature probe (Active)   Catheter Indications Urinary retention (acute or chronic), continuous bladder irrigation or bladder outlet obstruction 04/15/22 1200   Securement Device Date Changed 04/13/22 04/15/22 1200   Site Assessment No urethral drainage 04/15/22 1200   Urine Color Yellow 04/15/22 1200   Urine Appearance Clear 04/15/22 1200   Urine Odor Malodorous 04/15/22 1200   Output (mL) 400 mL 04/15/22 1215   Provider Notified to Remove Yes 04/15/22 0800       [REMOVED] NG/OG/NJ/NE Tube Orogastric 18 fr Left mouth (Removed)   Surrounding Skin Dry; Intact 04/12/22 1200   Securement device Yes 04/12/22 1200   Status Clamped 04/12/22 1200   Placement Verified by Gastric Contents;by External Catheter Length 04/12/22 1200   NG/OG/NJ/NE External Measurement (cm) 65 cm 04/12/22 1200   Drainage Appearance Brown 04/12/22 1200   Free Water Flush (mL) 30 mL 04/12/22 1200   Output (mL) 500 ml 04/12/22 0522       [REMOVED] Urethral Catheter Temperature probe 16 fr (Removed)   $ Urethral catheter insertion Inserted for procedure 04/12/22 0314   Catheter Indications Need for fluid volume management of the critically ill patient in a critical care setting 04/14/22 1600   Securement Device Date Changed 04/12/22 04/12/22 2000   Site Assessment No urethral drainage 04/14/22 1600   Urine Color Yellow 04/14/22 1600   Urine Appearance Clear 04/14/22 1600   Output (mL) 50 mL 04/14/22 1700       [REMOVED] External Urinary Catheter (Removed)   Catheter changed  Yes 04/14/22 1800   Output (mL) 0 mL 04/15/22 0000   Suction N/A 04/15/22 0000   Skin Assessment No Injury 04/15/22 0000       Findings:   PEG at 4cm    Electronically signed by Portia Hope DO on 4/15/2022 at 3:21 PM
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
## 2022-04-15 NOTE — PLAN OF CARE
Problem: Non-Violent Restraints  Goal: No harm/injury to patient while restraints in use  Outcome: Met This Shift  Goal: Patient's dignity will be maintained  Outcome: Met This Shift     Problem: OXYGENATION/RESPIRATORY FUNCTION  Goal: Patient will maintain patent airway  4/15/2022 1303 by Messi Bah RN  Outcome: Ongoing  4/15/2022 0817 by Ravi Tan RCP  Outcome: Ongoing  4/15/2022 0243 by Bel Capellan RCP  Outcome: Ongoing  4/15/2022 0242 by Bel Capellan RCP  Outcome: Ongoing  Goal: Patient will achieve/maintain normal respiratory rate/effort  Description: Respiratory rate and effort will be within normal limits for the patient  4/15/2022 1303 by Messi Bah RN  Outcome: Ongoing  4/15/2022 0817 by Ravi Tan RCP  Outcome: Ongoing  4/15/2022 0243 by Bel Capellan RCP  Outcome: Ongoing  4/15/2022 0242 by Bel Capellan RCP  Outcome: Ongoing     Problem: SKIN INTEGRITY  Goal: Skin integrity is maintained or improved  4/15/2022 1303 by Messi Bah RN  Outcome: Ongoing  4/15/2022 0817 by Ravi Tan RCP  Outcome: Ongoing  4/15/2022 0243 by Bel Capellan RCP  Outcome: Ongoing     Problem: Skin Integrity:  Goal: Will show no infection signs and symptoms  Description: Will show no infection signs and symptoms  Outcome: Ongoing  Goal: Absence of new skin breakdown  Description: Absence of new skin breakdown  Outcome: Ongoing     Problem: Falls - Risk of:  Goal: Will remain free from falls  Description: Will remain free from falls  Outcome: Ongoing  Goal: Absence of physical injury  Description: Absence of physical injury  Outcome: Ongoing     Problem: Nutrition  Goal: Optimal nutrition therapy  Outcome: Ongoing     Problem: MECHANICAL VENTILATION  Goal: Patient will maintain patent airway  4/15/2022 1303 by Messi Bah RN  Outcome: Ongoing  4/15/2022 0817 by Ravi Tan RCP  Outcome: Ongoing  4/15/2022 0243 by Bel Capellan RCP  Outcome: Ongoing  4/15/2022 0242 by Jeffery Sawant Rhys Mccarthy, RCP  Outcome: Ongoing  Goal: Oral health is maintained or improved  4/15/2022 1303 by Ross Franklin RN  Outcome: Ongoing  4/15/2022 0817 by Emily Howard, RCP  Outcome: Ongoing  4/15/2022 0243 by Damaris Ferrara, RCP  Outcome: Ongoing  Goal: ET tube will be managed safely  4/15/2022 1303 by Ross Franklin RN  Outcome: Ongoing  4/15/2022 0817 by Emily Howard, RCP  Outcome: Ongoing  4/15/2022 0243 by Damaris Ferrara, RCP  Outcome: Ongoing  Goal: Ability to express needs and understand communication  4/15/2022 1303 by Ross Franklin RN  Outcome: Ongoing  4/15/2022 0817 by Emily Howard, RCP  Outcome: Ongoing  4/15/2022 0243 by Damaris Ferrara, RCP  Outcome: Ongoing  Goal: Mobility/activity is maintained at optimum level for patient  4/15/2022 1303 by Ross Franklin RN  Outcome: Ongoing  4/15/2022 0817 by Emily Howard, RCP  Outcome: Ongoing  4/15/2022 0243 by Damaris Ferrara, RCP  Outcome: Ongoing

## 2022-04-15 NOTE — OP NOTE
Operative Note      Patient: Anita Summers  YOB: 1995  MRN: [id number]    Date of Procedure: 4/15/2022    Pre-Op Diagnosis: DYSPHAGIA    Post-Op Diagnosis: Same       EGD Percutaneous gastrostomy tube insertion     Surgeon(s):  Wild Bautista MD    Assistant:   Resident: Emily Mosquera DO; Remberto Garza DO    Anesthesia: General    Estimated Blood Loss (mL): none    Complications: None    Specimens:   * No specimens in log *    Implants:  * No implants in log *      Drains:   NG/OG/NJ/NE Tube Nasogastric 16 fr Left nostril (Active)   Surrounding Skin Dry; Intact 04/15/22 1200   Securement device Yes 04/15/22 1200   Status Clamped 04/15/22 1200   Placement Verified by Gastric Contents;by X-Ray (Initial);by Respiratory Status;by External Catheter Length 04/15/22 1200   NG/OG/NJ/NE External Measurement (cm) 65 cm 04/15/22 1200   Drainage Appearance Tan 04/15/22 0800   Tube Feeding Immune Enhancing 04/15/22 0400   Tube Feeding Status Stopped 04/15/22 0800   Rate/Schedule 55 mL/hr 04/15/22 0000   Tube Feeding Intake (mL) 167 ml 04/15/22 0400   Free Water Flush (mL) 30 mL 04/15/22 0800   Residual Volume (ml) 0 ml 04/14/22 0000   Output (mL) 0 ml 04/15/22 1200       Urethral Catheter Temperature probe (Active)   Catheter Indications Urinary retention (acute or chronic), continuous bladder irrigation or bladder outlet obstruction 04/15/22 1200   Securement Device Date Changed 04/13/22 04/15/22 1200   Site Assessment No urethral drainage 04/15/22 1200   Urine Color Yellow 04/15/22 1200   Urine Appearance Clear 04/15/22 1200   Urine Odor Malodorous 04/15/22 1200   Output (mL) 400 mL 04/15/22 1215   Provider Notified to Remove Yes 04/15/22 0800       [REMOVED] NG/OG/NJ/NE Tube Orogastric 18 fr Left mouth (Removed)   Surrounding Skin Dry; Intact 04/12/22 1200   Securement device Yes 04/12/22 1200   Status Clamped 04/12/22 1200   Placement Verified by Gastric Contents;by External Catheter Length 04/12/22 1200 NG/OG/NJ/NE External Measurement (cm) 65 cm 04/12/22 1200   Drainage Appearance Brown 04/12/22 1200   Free Water Flush (mL) 30 mL 04/12/22 1200   Output (mL) 500 ml 04/12/22 0522       [REMOVED] Urethral Catheter Temperature probe 16 fr (Removed)   $ Urethral catheter insertion Inserted for procedure 04/12/22 0314   Catheter Indications Need for fluid volume management of the critically ill patient in a critical care setting 04/14/22 1600   Securement Device Date Changed 04/12/22 04/12/22 2000   Site Assessment No urethral drainage 04/14/22 1600   Urine Color Yellow 04/14/22 1600   Urine Appearance Clear 04/14/22 1600   Output (mL) 50 mL 04/14/22 1700       [REMOVED] External Urinary Catheter (Removed)   Catheter changed  Yes 04/14/22 1800   Output (mL) 0 mL 04/15/22 0000   Suction N/A 04/15/22 0000   Skin Assessment No Injury 04/15/22 0000       Findings: normal mucosa of the esophagus and stomach, visualized internal bolster in place     Detailed Description of Procedure:     HISTORY: The patient is a 27y.o. year old male with history of above preop diagnosis. An esophagogastroduodenoscopy with placement of G tube with possible biopsy was recommended, and the risk, benefits, expected outcome, and alternatives to the procedure were explained. Risks included but are not limited to bleeding, infection, respiratory distress, hypotension, and perforation of the esophagus, stomach, or duodenum. Family understands and is in agreement. PROCEDURE: The pt was brought to the OR. The patient was intubated prior to arrival and PEG tube placement was planned to follow Tracheostomy. A time out was made to verify correct pt and procedure type. The patient was then prepped for both a tracheostomy and PEG placement, and draped initially for a tracheostomy. Please see the separate tracheostomy procedure note. After the Sa[place]l Fennel was placed and secured in place, a mouthpiece was placed in pt's mouth.   The endoscope was inserted orally and advanced under direct vision through the esophagus, through the stomach, through the pylorus, and into the descending duodenum. The stomach was then insufflated with air and positioned in the midportion and directed towards the anterior abdominal wall. With the room darkened and intensity turned up on the endoscope, a good light reflex was noted on the skin of the abdominal wall in the left upper quadrant. Finger pressure was applied at the light reflex with adequate indentation on the stomach wall on endoscopy. A polypectomy snare was passed into the stomach, opened fully, and positioned to loop encircle the point of demonstrated finger indentation. A 1.0 cm incision was made at the chosen site. The introducer needle with overlying catheter was passed through this incision and needle and catheter were gently captured by the endoscopic snare. The guide wire was passed and snared. The endoscope, snare, and guide wire were then withdrawn and pulled back out of the mouth. The gastrostomy tube was attached to the loop of the guide wire and the whole thing pulled back into the stomach under endoscopic visualization until the 4 cm gli of the gastrostomy tube was noted at skin level. Adequate placement of the gastrostomy tube and internal bolster was verified. The gastrostomy tube end was cut and the cramping appendage was placed. The tube was then secured to skin. Abdominal binder was ordered. The pt tolerated the procedure well and was taken back to TICU in stable condition. Findings:  Duodenum: not visualized    Stomach:    Antrum: normal    Body: normal    Fundus: normal    Esophagus: normal    Larynx: normal    The scope was removed and the patient tolerated the procedure well. IMPRESSION/PLAN:   1. G tube to gravity  2. Abdominal binder  3. Ok to use for meds now. 4. TF start at [place] on 4/16.        Electronically signed by Delon Adan DO on 4/15/2022 at 3:08 PM

## 2022-04-15 NOTE — PLAN OF CARE
--  NO ACUTE NEUROSURGICAL INTERVENTION AT THIS TIME    NEUROSURGERY TO SIGN OFF     Please contact Neurosurgery with any questions. Continue aspen cervical collar for occipital condyle fracture. Okay to remove while showering. Follow up in 4-6 weeks with Dr Shabana Castillo for occipital condyle fracture. PATIENT TO FOLLOW UP IN CLINIC:  ---  Follow-up with Neurosurgery  [facility]  [place] [place] [address]  [phone] for an appointment.   --

## 2022-04-15 NOTE — PROGRESS NOTES
Occupational [address]  Occupational Therapy Not Seen Note    DATE: 4/15/2022    NAME: Lucila Cruz  MRN: [id number]   : 1995      Patient not seen this date for Occupational Therapy due to:    Pt is not appropriate for active participation in OT  At this time. Pt remains Intubated & Sedated; in OR today for Tracheostomy.         Electronically signed by MEDARDO Gutierrez OTR/L on 4/15/2022 at 1:12 PM

## 2022-04-15 NOTE — PLAN OF CARE
Problem: OXYGENATION/RESPIRATORY FUNCTION  Goal: Patient will maintain patent airway  4/15/2022 0817 by Ming Do RCP  Outcome: Ongoing     Problem: OXYGENATION/RESPIRATORY FUNCTION  Goal: Patient will achieve/maintain normal respiratory rate/effort  Description: Respiratory rate and effort will be within normal limits for the patient  4/15/2022 0817 by Ming Do RCP  Outcome: Ongoing     Problem: SKIN INTEGRITY  Goal: Skin integrity is maintained or improved  4/15/2022 0817 by Ming Do RCP  Outcome: Ongoing     Problem: MECHANICAL VENTILATION  Goal: Patient will maintain patent airway  4/15/2022 0817 by Ming Do RCP  Outcome: Ongoing     Problem: MECHANICAL VENTILATION  Goal: Oral health is maintained or improved  4/15/2022 0817 by Ming Do RCP  Outcome: Ongoing     Problem: MECHANICAL VENTILATION  Goal: ET tube will be managed safely  4/15/2022 0817 by Ming Do RCP  Outcome: Ongoing     Problem: MECHANICAL VENTILATION  Goal: Ability to express needs and understand communication  4/15/2022 0817 by Ming Do RCP  Outcome: Ongoing     Problem: MECHANICAL VENTILATION  Goal: Mobility/activity is maintained at optimum level for patient  4/15/2022 0817 by Ming Do RCP  Outcome: Ongoing

## 2022-04-16 ENCOUNTER — APPOINTMENT (OUTPATIENT)
Dept: GENERAL RADIOLOGY | Age: 27
DRG: 005 | End: 2022-04-16
Payer: MEDICAID

## 2022-04-16 LAB
ANION GAP SERPL CALCULATED.3IONS-SCNC: 7 MMOL/L (ref 9–17)
BUN BLDV-MCNC: 9 MG/DL (ref 6–20)
CALCIUM SERPL-MCNC: 8.1 MG/DL (ref 8.6–10.4)
CHLORIDE BLD-SCNC: 108 MMOL/L (ref 98–107)
CO2: 25 MMOL/L (ref 20–31)
CREAT SERPL-MCNC: 0.8 MG/DL (ref 0.7–1.2)
FIO2: 30
GFR AFRICAN AMERICAN: >60 ML/MIN
GFR NON-AFRICAN AMERICAN: >60 ML/MIN
GFR SERPL CREATININE-BSD FRML MDRD: ABNORMAL ML/MIN/{1.73_M2}
GLUCOSE BLD-MCNC: 120 MG/DL (ref 70–99)
GLUCOSE BLD-MCNC: 127 MG/DL (ref 74–100)
HCT VFR BLD CALC: 31.5 % (ref 40.7–50.3)
HEMOGLOBIN: 10.5 G/DL (ref 13–17)
MAGNESIUM: 1.5 MG/DL (ref 1.6–2.6)
MCH RBC QN AUTO: 32.9 PG (ref 25.2–33.5)
MCHC RBC AUTO-ENTMCNC: 33.3 G/DL (ref 28.4–34.8)
MCV RBC AUTO: 98.7 FL (ref 82.6–102.9)
MODE: ABNORMAL
NRBC AUTOMATED: 0 PER 100 WBC
O2 DEVICE/FLOW/%: ABNORMAL
PDW BLD-RTO: 12.8 % (ref 11.8–14.4)
PHOSPHORUS: 4.4 MG/DL (ref 2.5–4.5)
PLATELET # BLD: 173 K/UL (ref 138–453)
PMV BLD AUTO: 9.7 FL (ref 8.1–13.5)
POC HCO3: 26.8 MMOL/L (ref 21–28)
POC LACTIC ACID: 0.64 MMOL/L (ref 0.56–1.39)
POC O2 SATURATION: 97 % (ref 94–98)
POC PCO2: 48.4 MM HG (ref 35–48)
POC PH: 7.35 (ref 7.35–7.45)
POC PO2: 101.4 MM HG (ref 83–108)
POSITIVE BASE EXCESS, ART: 1 (ref 0–3)
POTASSIUM SERPL-SCNC: 3.8 MMOL/L (ref 3.7–5.3)
RBC # BLD: 3.19 M/UL (ref 4.21–5.77)
SAMPLE SITE: ABNORMAL
SODIUM BLD-SCNC: 140 MMOL/L (ref 135–144)
WBC # BLD: 5.1 K/UL (ref 3.5–11.3)

## 2022-04-16 PROCEDURE — 82803 BLOOD GASES ANY COMBINATION: CPT

## 2022-04-16 PROCEDURE — 83605 ASSAY OF LACTIC ACID: CPT

## 2022-04-16 PROCEDURE — 2000000000 HC ICU R&B

## 2022-04-16 PROCEDURE — 94761 N-INVAS EAR/PLS OXIMETRY MLT: CPT

## 2022-04-16 PROCEDURE — 85027 COMPLETE CBC AUTOMATED: CPT

## 2022-04-16 PROCEDURE — 2700000000 HC OXYGEN THERAPY PER DAY

## 2022-04-16 PROCEDURE — 6360000002 HC RX W HCPCS: Performed by: EMERGENCY MEDICINE

## 2022-04-16 PROCEDURE — 2580000003 HC RX 258: Performed by: EMERGENCY MEDICINE

## 2022-04-16 PROCEDURE — 2500000003 HC RX 250 WO HCPCS: Performed by: EMERGENCY MEDICINE

## 2022-04-16 PROCEDURE — 6370000000 HC RX 637 (ALT 250 FOR IP): Performed by: STUDENT IN AN ORGANIZED HEALTH CARE EDUCATION/TRAINING PROGRAM

## 2022-04-16 PROCEDURE — 6370000000 HC RX 637 (ALT 250 FOR IP): Performed by: EMERGENCY MEDICINE

## 2022-04-16 PROCEDURE — 82947 ASSAY GLUCOSE BLOOD QUANT: CPT

## 2022-04-16 PROCEDURE — 80048 BASIC METABOLIC PNL TOTAL CA: CPT

## 2022-04-16 PROCEDURE — 83735 ASSAY OF MAGNESIUM: CPT

## 2022-04-16 PROCEDURE — 94003 VENT MGMT INPAT SUBQ DAY: CPT

## 2022-04-16 PROCEDURE — 84100 ASSAY OF PHOSPHORUS: CPT

## 2022-04-16 PROCEDURE — 2500000003 HC RX 250 WO HCPCS: Performed by: STUDENT IN AN ORGANIZED HEALTH CARE EDUCATION/TRAINING PROGRAM

## 2022-04-16 PROCEDURE — 37799 UNLISTED PX VASCULAR SURGERY: CPT

## 2022-04-16 PROCEDURE — 71045 X-RAY EXAM CHEST 1 VIEW: CPT

## 2022-04-16 RX ORDER — MAGNESIUM SULFATE IN WATER 40 MG/ML
4000 INJECTION, SOLUTION INTRAVENOUS ONCE
Status: COMPLETED | OUTPATIENT
Start: 2022-04-16 | End: 2022-04-16

## 2022-04-16 RX ORDER — GABAPENTIN 600 MG/1
600 TABLET ORAL EVERY 8 HOURS
Status: DISCONTINUED | OUTPATIENT
Start: 2022-04-16 | End: 2022-05-10

## 2022-04-16 RX ORDER — DEXMEDETOMIDINE HYDROCHLORIDE 4 UG/ML
.1-1.5 INJECTION, SOLUTION INTRAVENOUS CONTINUOUS
Status: DISCONTINUED | OUTPATIENT
Start: 2022-04-16 | End: 2022-04-16

## 2022-04-16 RX ORDER — DEXMEDETOMIDINE HYDROCHLORIDE 4 UG/ML
.1-1.5 INJECTION, SOLUTION INTRAVENOUS CONTINUOUS
Status: DISCONTINUED | OUTPATIENT
Start: 2022-04-16 | End: 2022-04-23

## 2022-04-16 RX ORDER — CLONIDINE HYDROCHLORIDE 0.1 MG/1
0.1 TABLET ORAL 3 TIMES DAILY
Status: DISCONTINUED | OUTPATIENT
Start: 2022-04-16 | End: 2022-04-27

## 2022-04-16 RX ADMIN — PROPOFOL 20 MCG/KG/MIN: 10 INJECTION, EMULSION INTRAVENOUS at 00:38

## 2022-04-16 RX ADMIN — IBUPROFEN 400 MG: 100 SUSPENSION ORAL at 13:54

## 2022-04-16 RX ADMIN — FOLIC ACID 1 MG: 1 TABLET ORAL at 07:54

## 2022-04-16 RX ADMIN — OXYCODONE HYDROCHLORIDE 5 MG: 5 TABLET ORAL at 13:54

## 2022-04-16 RX ADMIN — ENOXAPARIN SODIUM 30 MG: 100 INJECTION SUBCUTANEOUS at 20:14

## 2022-04-16 RX ADMIN — OXYCODONE HYDROCHLORIDE 5 MG: 5 TABLET ORAL at 23:56

## 2022-04-16 RX ADMIN — METOCLOPRAMIDE 10 MG: 10 TABLET ORAL at 04:37

## 2022-04-16 RX ADMIN — QUETIAPINE FUMARATE 100 MG: 100 TABLET ORAL at 20:08

## 2022-04-16 RX ADMIN — ACETAMINOPHEN 1000 MG: 500 TABLET ORAL at 06:38

## 2022-04-16 RX ADMIN — METOCLOPRAMIDE 10 MG: 10 TABLET ORAL at 15:54

## 2022-04-16 RX ADMIN — BACITRACIN: 500 OINTMENT TOPICAL at 13:54

## 2022-04-16 RX ADMIN — ERYTHROMYCIN: 5 OINTMENT OPHTHALMIC at 20:14

## 2022-04-16 RX ADMIN — DEXMEDETOMIDINE HYDROCHLORIDE 0.6 MCG/KG/HR: 4 INJECTION, SOLUTION INTRAVENOUS at 23:58

## 2022-04-16 RX ADMIN — Medication 100 MCG/HR: at 04:08

## 2022-04-16 RX ADMIN — DOCUSATE SODIUM 50 MG AND SENNOSIDES 8.6 MG 1 TABLET: 8.6; 5 TABLET, FILM COATED ORAL at 07:54

## 2022-04-16 RX ADMIN — IBUPROFEN 400 MG: 100 SUSPENSION ORAL at 04:37

## 2022-04-16 RX ADMIN — MAGNESIUM SULFATE 4000 MG: 2 INJECTION INTRAVENOUS at 08:13

## 2022-04-16 RX ADMIN — DEXMEDETOMIDINE HYDROCHLORIDE 0.6 MCG/KG/HR: 4 INJECTION, SOLUTION INTRAVENOUS at 17:41

## 2022-04-16 RX ADMIN — IBUPROFEN 400 MG: 100 SUSPENSION ORAL at 00:58

## 2022-04-16 RX ADMIN — OXYCODONE HYDROCHLORIDE 5 MG: 5 TABLET ORAL at 07:55

## 2022-04-16 RX ADMIN — PROPOFOL 15 MCG/KG/MIN: 10 INJECTION, EMULSION INTRAVENOUS at 06:36

## 2022-04-16 RX ADMIN — DIAZEPAM 10 MG: 5 TABLET ORAL at 15:52

## 2022-04-16 RX ADMIN — IBUPROFEN 400 MG: 100 SUSPENSION ORAL at 23:56

## 2022-04-16 RX ADMIN — METOCLOPRAMIDE 10 MG: 10 TABLET ORAL at 09:52

## 2022-04-16 RX ADMIN — GABAPENTIN 600 MG: 600 TABLET ORAL at 13:53

## 2022-04-16 RX ADMIN — OXYCODONE HYDROCHLORIDE 5 MG: 5 TABLET ORAL at 01:00

## 2022-04-16 RX ADMIN — DOCUSATE SODIUM 50 MG AND SENNOSIDES 8.6 MG 1 TABLET: 8.6; 5 TABLET, FILM COATED ORAL at 20:08

## 2022-04-16 RX ADMIN — IBUPROFEN 400 MG: 100 SUSPENSION ORAL at 07:55

## 2022-04-16 RX ADMIN — Medication 100 MCG/HR: at 14:01

## 2022-04-16 RX ADMIN — GABAPENTIN 600 MG: 600 TABLET ORAL at 20:08

## 2022-04-16 RX ADMIN — CLONIDINE HYDROCHLORIDE 0.1 MG: 0.1 TABLET ORAL at 20:08

## 2022-04-16 RX ADMIN — IBUPROFEN 400 MG: 100 SUSPENSION ORAL at 20:13

## 2022-04-16 RX ADMIN — OXYCODONE HYDROCHLORIDE 5 MG: 5 TABLET ORAL at 20:13

## 2022-04-16 RX ADMIN — SODIUM CHLORIDE: 9 INJECTION, SOLUTION INTRAVENOUS at 06:37

## 2022-04-16 RX ADMIN — POLYETHYLENE GLYCOL 3350 17 G: 17 POWDER, FOR SOLUTION ORAL at 07:54

## 2022-04-16 RX ADMIN — FAMOTIDINE 20 MG: 20 TABLET, FILM COATED ORAL at 20:08

## 2022-04-16 RX ADMIN — BACITRACIN: 500 OINTMENT TOPICAL at 20:14

## 2022-04-16 RX ADMIN — IBUPROFEN 400 MG: 100 SUSPENSION ORAL at 16:47

## 2022-04-16 RX ADMIN — DEXMEDETOMIDINE HYDROCHLORIDE 0.2 MCG/KG/HR: 4 INJECTION, SOLUTION INTRAVENOUS at 11:18

## 2022-04-16 RX ADMIN — GABAPENTIN 300 MG: 600 TABLET ORAL at 06:37

## 2022-04-16 RX ADMIN — ENOXAPARIN SODIUM 30 MG: 100 INJECTION SUBCUTANEOUS at 07:54

## 2022-04-16 RX ADMIN — ACETAMINOPHEN 1000 MG: 500 TABLET ORAL at 13:54

## 2022-04-16 RX ADMIN — DIAZEPAM 10 MG: 5 TABLET ORAL at 09:52

## 2022-04-16 RX ADMIN — METOCLOPRAMIDE 10 MG: 10 TABLET ORAL at 23:44

## 2022-04-16 RX ADMIN — CLONIDINE HYDROCHLORIDE 0.1 MG: 0.1 TABLET ORAL at 13:53

## 2022-04-16 RX ADMIN — QUETIAPINE FUMARATE 100 MG: 100 TABLET ORAL at 07:54

## 2022-04-16 RX ADMIN — DIAZEPAM 10 MG: 5 TABLET ORAL at 04:37

## 2022-04-16 RX ADMIN — KETAMINE HYDROCHLORIDE 0.2 MG/KG/HR: 50 INJECTION INTRAMUSCULAR; INTRAVENOUS at 19:29

## 2022-04-16 RX ADMIN — BACITRACIN: 500 OINTMENT TOPICAL at 07:55

## 2022-04-16 RX ADMIN — DIAZEPAM 10 MG: 5 TABLET ORAL at 23:44

## 2022-04-16 RX ADMIN — FAMOTIDINE 20 MG: 20 TABLET, FILM COATED ORAL at 07:54

## 2022-04-16 RX ADMIN — ACETAMINOPHEN 1000 MG: 500 TABLET ORAL at 23:44

## 2022-04-16 ASSESSMENT — PULMONARY FUNCTION TESTS
PIF_VALUE: 18
PIF_VALUE: 17
PIF_VALUE: 18
PIF_VALUE: 17
PIF_VALUE: 18

## 2022-04-16 NOTE — PLAN OF CARE
Problem: OXYGENATION/RESPIRATORY FUNCTION  Goal: Patient will maintain patent airway  Outcome: Ongoing  Goal: Patient will achieve/maintain normal respiratory rate/effort  Description: Respiratory rate and effort will be within normal limits for the patient  Outcome: Ongoing     Problem: SKIN INTEGRITY  Goal: Skin integrity is maintained or improved  Outcome: Ongoing     Problem: Skin Integrity:  Goal: Will show no infection signs and symptoms  Description: Will show no infection signs and symptoms  Outcome: Ongoing  Goal: Absence of new skin breakdown  Description: Absence of new skin breakdown  Outcome: Ongoing     Problem: Falls - Risk of:  Goal: Will remain free from falls  Description: Will remain free from falls  Outcome: Ongoing  Goal: Absence of physical injury  Description: Absence of physical injury  Outcome: Ongoing     Problem: Nutrition  Goal: Optimal nutrition therapy  Outcome: Ongoing     Problem: MECHANICAL VENTILATION  Goal: Patient will maintain patent airway  Outcome: Ongoing  Goal: Oral health is maintained or improved  Outcome: Ongoing  Goal: ET tube will be managed safely  Outcome: Ongoing  Goal: Ability to express needs and understand communication  Outcome: Ongoing  Goal: Mobility/activity is maintained at optimum level for patient  Outcome: Ongoing     Problem: Non-Violent Restraints  Goal: Removal from restraints as soon as assessed to be safe  Outcome: Ongoing     Problem: Non-Violent Restraints  Goal: No harm/injury to patient while restraints in use  Outcome: Ongoing     Problem: Non-Violent Restraints  Goal: Patient's dignity will be maintained  Outcome: Ongoing

## 2022-04-16 NOTE — PROGRESS NOTES
Occupational [address]  Occupational Therapy Not Seen Note    DATE: 2022    NAME: Veleta Dance  MRN: [id number]   : 1995      Patient not seen this date for Occupational Therapy due to:    Patient is not appropriate for active participation in OT evaluation/treatment at this time d/t trach, on sedation and SBR remains in place.      Next Scheduled Treatment: Check      Electronically signed by Charles Nunez OT on 2022 at 11:21 AM

## 2022-04-16 NOTE — PLAN OF CARE
Problem: OXYGENATION/RESPIRATORY FUNCTION  Goal: Patient will maintain patent airway  4/16/2022 1706 by Anish Cox RN  Outcome: Ongoing     Problem: OXYGENATION/RESPIRATORY FUNCTION  Goal: Patient will achieve/maintain normal respiratory rate/effort  Description: Respiratory rate and effort will be within normal limits for the patient  4/16/2022 1706 by Anish Cox RN  Outcome: Ongoing     Problem: SKIN INTEGRITY  Goal: Skin integrity is maintained or improved  4/16/2022 1706 by Anish Cox RN  Outcome: Ongoing     Problem: Non-Violent Restraints  Goal: Removal from restraints as soon as assessed to be safe  4/16/2022 1706 by Anish Cox RN  Outcome: Ongoing     Problem: Non-Violent Restraints  Goal: No harm/injury to patient while restraints in use  4/16/2022 1706 by Anish Cox RN  Outcome: Ongoing     Problem: Non-Violent Restraints  Goal: Patient's dignity will be maintained  4/16/2022 1706 by Anish Cox RN  Outcome: Ongoing     Problem: Skin Integrity:  Goal: Will show no infection signs and symptoms  Description: Will show no infection signs and symptoms  4/16/2022 1706 by Anish Cox RN  Outcome: Ongoing     Problem: Skin Integrity:  Goal: Absence of new skin breakdown  Description: Absence of new skin breakdown  4/16/2022 1706 by Anish Cox RN  Outcome: Ongoing     Problem: Falls - Risk of:  Goal: Will remain free from falls  Description: Will remain free from falls  4/16/2022 1706 by Anish Cox RN  Outcome: Ongoing     Problem: Falls - Risk of:  Goal: Absence of physical injury  Description: Absence of physical injury  4/16/2022 1706 by Anish Cox RN  Outcome: Ongoing     Problem: Nutrition  Goal: Optimal nutrition therapy  4/16/2022 1706 by Anish Cox RN  Outcome: Ongoing     Problem: MECHANICAL VENTILATION  Goal: Patient will maintain patent airway  4/16/2022 1706 by Anish Cox RN  Outcome: Ongoing     Problem: MECHANICAL VENTILATION  Goal: Oral health is maintained or improved  4/16/2022 1706 by Homero Hudson RN  Outcome: Ongoing     Problem: MECHANICAL VENTILATION  Goal: ET tube will be managed safely  4/16/2022 1706 by Homero Hudson RN  Outcome: Ongoing     Problem: MECHANICAL VENTILATION  Goal: Ability to express needs and understand communication  4/16/2022 1706 by Homero Hudson RN  Outcome: Ongoing     Problem: MECHANICAL VENTILATION  Goal: Mobility/activity is maintained at optimum level for patient  4/16/2022 1706 by Homero Hudson RN  Outcome: Ongoing

## 2022-04-16 NOTE — PLAN OF CARE
Problem: OXYGENATION/RESPIRATORY FUNCTION  Goal: Patient will maintain patent airway  4/16/2022 1206 by Dipak Rivera RCP  Outcome: Ongoing  4/16/2022 0658 by Tabitha Tejeda RN  Outcome: Ongoing  Goal: Patient will achieve/maintain normal respiratory rate/effort  Description: Respiratory rate and effort will be within normal limits for the patient  4/16/2022 1206 by Dipak Rivera RCP  Outcome: Ongoing  4/16/2022 0658 by Tabitha Tejeda RN  Outcome: Ongoing     Problem: SKIN INTEGRITY  Goal: Skin integrity is maintained or improved  4/16/2022 1206 by Dipak Rivera RCP  Outcome: Ongoing  4/16/2022 0658 by Tabitha Tejeda RN  Outcome: Ongoing     Problem: MECHANICAL VENTILATION  Goal: Patient will maintain patent airway  4/16/2022 1206 by Dipak Rivera RCP  Outcome: Ongoing  4/16/2022 0658 by Tabitha Tejeda RN  Outcome: Ongoing  Goal: Oral health is maintained or improved  4/16/2022 1206 by Dipak Rivera RCP  Outcome: Ongoing  4/16/2022 0658 by Tabitha Tejeda RN  Outcome: Ongoing  Goal: ET tube will be managed safely  4/16/2022 1206 by Dipak Rivera RCP  Outcome: Ongoing  4/16/2022 0658 by Tabitha Tejeda RN  Outcome: Ongoing  Goal: Ability to express needs and understand communication  4/16/2022 1206 by Dipak Rivera RCP  Outcome: Ongoing  4/16/2022 0658 by Tabitha Tejeda RN  Outcome: Ongoing  Goal: Mobility/activity is maintained at optimum level for patient  4/16/2022 1206 by Dipak Rivera RCP  Outcome: Ongoing  4/16/2022 0658 by Tabitha Tejeda RN  Outcome: Ongoing

## 2022-04-16 NOTE — PLAN OF CARE
Problem: OXYGENATION/RESPIRATORY FUNCTION  Goal: Patient will maintain patent airway  4/16/2022 1721 by Mata Toscano RCP  Outcome: Ongoing  4/16/2022 1706 by Nini Chapin RN  Outcome: Ongoing  4/16/2022 1206 by Mata Toscano RCP  Outcome: Ongoing  4/16/2022 0658 by Alex Toro RN  Outcome: Ongoing  Goal: Patient will achieve/maintain normal respiratory rate/effort  Description: Respiratory rate and effort will be within normal limits for the patient  4/16/2022 1721 by Mata Toscano, RCP  Outcome: Ongoing  4/16/2022 1706 by Nini Chapin RN  Outcome: Ongoing  4/16/2022 1206 by KELSEA LloydP  Outcome: Ongoing  4/16/2022 0658 by Alex Toro RN  Outcome: Ongoing     Problem: SKIN INTEGRITY  Goal: Skin integrity is maintained or improved  4/16/2022 1721 by KELSEA LloydP  Outcome: Ongoing  4/16/2022 1706 by Nini Chapin RN  Outcome: Ongoing  4/16/2022 1206 by KELSEA LloydP  Outcome: Ongoing  4/16/2022 0658 by Alex Toro RN  Outcome: Ongoing     Problem: MECHANICAL VENTILATION  Goal: Patient will maintain patent airway  4/16/2022 1721 by Mata Toscano RCP  Outcome: Ongoing  4/16/2022 1706 by Nini Chapin RN  Outcome: Ongoing  4/16/2022 1206 by KELSEA LloydP  Outcome: Ongoing  4/16/2022 0658 by Alex Toro RN  Outcome: Ongoing  Goal: Oral health is maintained or improved  4/16/2022 1721 by KELSEA LloydP  Outcome: Ongoing  4/16/2022 1706 by Nini Chapin RN  Outcome: Ongoing  4/16/2022 1206 by KELSEA LloydP  Outcome: Ongoing  4/16/2022 0658 by Alex Toro RN  Outcome: Ongoing  Goal: ET tube will be managed safely  4/16/2022 1721 by KELSEA LloydP  Outcome: Ongoing  4/16/2022 1706 by Nini Chapin RN  Outcome: Ongoing  4/16/2022 1206 by Mata Toscano RCP  Outcome: Ongoing  4/16/2022 0658 by Alex Toro RN  Outcome: Ongoing  Goal: Ability to express needs and understand communication  4/16/2022 1721 by Mata Toscano RCP  Outcome: Ongoing  4/16/2022 1706 by Nini Chapin RN  Outcome: Ongoing  4/16/2022 1206 by Mata Toscano RCP  Outcome: Ongoing  4/16/2022 0658 by Alex Toro RN  Outcome: Ongoing  Goal: Mobility/activity is maintained at optimum level for patient  4/16/2022 1721 by Mata Toscano RCP  Outcome: Ongoing  4/16/2022 1706 by Nini Chapin RN  Outcome: Ongoing  4/16/2022 1206 by Mata Toscano RCP  Outcome: Ongoing  4/16/2022 0658 by Alex Toro RN  Outcome: Ongoing
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
## 2022-04-17 ENCOUNTER — APPOINTMENT (OUTPATIENT)
Dept: GENERAL RADIOLOGY | Age: 27
DRG: 005 | End: 2022-04-17
Payer: MEDICAID

## 2022-04-17 LAB
ALLEN TEST: POSITIVE
ANION GAP SERPL CALCULATED.3IONS-SCNC: 9 MMOL/L (ref 9–17)
BUN BLDV-MCNC: 13 MG/DL (ref 6–20)
CALCIUM SERPL-MCNC: 8.2 MG/DL (ref 8.6–10.4)
CHLORIDE BLD-SCNC: 110 MMOL/L (ref 98–107)
CO2: 25 MMOL/L (ref 20–31)
CREAT SERPL-MCNC: 0.7 MG/DL (ref 0.7–1.2)
FIO2: 30
FIO2: 30
GFR AFRICAN AMERICAN: >60 ML/MIN
GFR NON-AFRICAN AMERICAN: >60 ML/MIN
GFR SERPL CREATININE-BSD FRML MDRD: ABNORMAL ML/MIN/{1.73_M2}
GLUCOSE BLD-MCNC: 102 MG/DL (ref 75–110)
GLUCOSE BLD-MCNC: 108 MG/DL (ref 70–99)
GLUCOSE BLD-MCNC: 129 MG/DL (ref 74–100)
HCT VFR BLD CALC: 33.7 % (ref 40.7–50.3)
HEMOGLOBIN: 11.4 G/DL (ref 13–17)
MAGNESIUM: 1.7 MG/DL (ref 1.6–2.6)
MCH RBC QN AUTO: 32.8 PG (ref 25.2–33.5)
MCHC RBC AUTO-ENTMCNC: 33.8 G/DL (ref 28.4–34.8)
MCV RBC AUTO: 96.8 FL (ref 82.6–102.9)
MODE: ABNORMAL
MODE: NORMAL
NRBC AUTOMATED: 0 PER 100 WBC
O2 DEVICE/FLOW/%: ABNORMAL
O2 DEVICE/FLOW/%: NORMAL
PDW BLD-RTO: 12.4 % (ref 11.8–14.4)
PHOSPHORUS: 2.7 MG/DL (ref 2.5–4.5)
PLATELET # BLD: 150 K/UL (ref 138–453)
PMV BLD AUTO: 9.5 FL (ref 8.1–13.5)
POC HCO3: 25 MMOL/L (ref 21–28)
POC HCO3: 28.6 MMOL/L (ref 21–28)
POC LACTIC ACID: 0.63 MMOL/L (ref 0.56–1.39)
POC LACTIC ACID: 0.7 MMOL/L (ref 0.56–1.39)
POC O2 SATURATION: 97 % (ref 94–98)
POC O2 SATURATION: 97 % (ref 94–98)
POC PCO2: 37.9 MM HG (ref 35–48)
POC PCO2: 41 MM HG (ref 35–48)
POC PH: 7.43 (ref 7.35–7.45)
POC PH: 7.45 (ref 7.35–7.45)
POC PO2: 89.5 MM HG (ref 83–108)
POC PO2: 91.7 MM HG (ref 83–108)
POSITIVE BASE EXCESS, ART: 1 (ref 0–3)
POSITIVE BASE EXCESS, ART: 4 (ref 0–3)
POTASSIUM SERPL-SCNC: 4.2 MMOL/L (ref 3.7–5.3)
RBC # BLD: 3.48 M/UL (ref 4.21–5.77)
SAMPLE SITE: ABNORMAL
SAMPLE SITE: NORMAL
SODIUM BLD-SCNC: 144 MMOL/L (ref 135–144)
WBC # BLD: 7.4 K/UL (ref 3.5–11.3)

## 2022-04-17 PROCEDURE — 6370000000 HC RX 637 (ALT 250 FOR IP): Performed by: STUDENT IN AN ORGANIZED HEALTH CARE EDUCATION/TRAINING PROGRAM

## 2022-04-17 PROCEDURE — 94761 N-INVAS EAR/PLS OXIMETRY MLT: CPT

## 2022-04-17 PROCEDURE — 6360000002 HC RX W HCPCS: Performed by: EMERGENCY MEDICINE

## 2022-04-17 PROCEDURE — 6360000002 HC RX W HCPCS: Performed by: STUDENT IN AN ORGANIZED HEALTH CARE EDUCATION/TRAINING PROGRAM

## 2022-04-17 PROCEDURE — 2580000003 HC RX 258: Performed by: EMERGENCY MEDICINE

## 2022-04-17 PROCEDURE — 80048 BASIC METABOLIC PNL TOTAL CA: CPT

## 2022-04-17 PROCEDURE — 83605 ASSAY OF LACTIC ACID: CPT

## 2022-04-17 PROCEDURE — 2700000000 HC OXYGEN THERAPY PER DAY

## 2022-04-17 PROCEDURE — 2500000003 HC RX 250 WO HCPCS: Performed by: STUDENT IN AN ORGANIZED HEALTH CARE EDUCATION/TRAINING PROGRAM

## 2022-04-17 PROCEDURE — 2000000000 HC ICU R&B

## 2022-04-17 PROCEDURE — 82947 ASSAY GLUCOSE BLOOD QUANT: CPT

## 2022-04-17 PROCEDURE — 36600 WITHDRAWAL OF ARTERIAL BLOOD: CPT

## 2022-04-17 PROCEDURE — 85027 COMPLETE CBC AUTOMATED: CPT

## 2022-04-17 PROCEDURE — 84100 ASSAY OF PHOSPHORUS: CPT

## 2022-04-17 PROCEDURE — 94003 VENT MGMT INPAT SUBQ DAY: CPT

## 2022-04-17 PROCEDURE — 82803 BLOOD GASES ANY COMBINATION: CPT

## 2022-04-17 PROCEDURE — 71045 X-RAY EXAM CHEST 1 VIEW: CPT

## 2022-04-17 PROCEDURE — 36415 COLL VENOUS BLD VENIPUNCTURE: CPT

## 2022-04-17 PROCEDURE — 2580000003 HC RX 258: Performed by: STUDENT IN AN ORGANIZED HEALTH CARE EDUCATION/TRAINING PROGRAM

## 2022-04-17 PROCEDURE — 6370000000 HC RX 637 (ALT 250 FOR IP): Performed by: EMERGENCY MEDICINE

## 2022-04-17 PROCEDURE — 83735 ASSAY OF MAGNESIUM: CPT

## 2022-04-17 RX ORDER — MAGNESIUM SULFATE HEPTAHYDRATE 40 MG/ML
4000 INJECTION, SOLUTION INTRAVENOUS ONCE
Status: COMPLETED | OUTPATIENT
Start: 2022-04-17 | End: 2022-04-17

## 2022-04-17 RX ORDER — DIAZEPAM 5 MG/1
5 TABLET ORAL EVERY 6 HOURS
Status: DISCONTINUED | OUTPATIENT
Start: 2022-04-17 | End: 2022-04-25

## 2022-04-17 RX ORDER — FUROSEMIDE 10 MG/ML
40 INJECTION INTRAMUSCULAR; INTRAVENOUS ONCE
Status: COMPLETED | OUTPATIENT
Start: 2022-04-17 | End: 2022-04-17

## 2022-04-17 RX ADMIN — METOCLOPRAMIDE 10 MG: 10 TABLET ORAL at 16:21

## 2022-04-17 RX ADMIN — CLONIDINE HYDROCHLORIDE 0.1 MG: 0.1 TABLET ORAL at 20:59

## 2022-04-17 RX ADMIN — FENTANYL CITRATE 100 MCG: 50 INJECTION, SOLUTION INTRAMUSCULAR; INTRAVENOUS at 04:47

## 2022-04-17 RX ADMIN — CLONIDINE HYDROCHLORIDE 0.1 MG: 0.1 TABLET ORAL at 08:08

## 2022-04-17 RX ADMIN — OXYCODONE HYDROCHLORIDE 5 MG: 5 TABLET ORAL at 13:10

## 2022-04-17 RX ADMIN — ENOXAPARIN SODIUM 30 MG: 100 INJECTION SUBCUTANEOUS at 08:07

## 2022-04-17 RX ADMIN — OXYCODONE HYDROCHLORIDE 5 MG: 5 TABLET ORAL at 21:11

## 2022-04-17 RX ADMIN — QUETIAPINE FUMARATE 100 MG: 100 TABLET ORAL at 08:07

## 2022-04-17 RX ADMIN — GABAPENTIN 600 MG: 600 TABLET ORAL at 20:59

## 2022-04-17 RX ADMIN — IBUPROFEN 400 MG: 100 SUSPENSION ORAL at 21:11

## 2022-04-17 RX ADMIN — FAMOTIDINE 20 MG: 20 TABLET, FILM COATED ORAL at 08:07

## 2022-04-17 RX ADMIN — SODIUM CHLORIDE, PRESERVATIVE FREE 10 ML: 5 INJECTION INTRAVENOUS at 08:07

## 2022-04-17 RX ADMIN — CLONIDINE HYDROCHLORIDE 0.1 MG: 0.1 TABLET ORAL at 13:11

## 2022-04-17 RX ADMIN — POLYETHYLENE GLYCOL 3350 17 G: 17 POWDER, FOR SOLUTION ORAL at 08:08

## 2022-04-17 RX ADMIN — DIAZEPAM 10 MG: 5 TABLET ORAL at 09:36

## 2022-04-17 RX ADMIN — FAMOTIDINE 20 MG: 20 TABLET, FILM COATED ORAL at 20:59

## 2022-04-17 RX ADMIN — QUETIAPINE FUMARATE 100 MG: 100 TABLET ORAL at 20:59

## 2022-04-17 RX ADMIN — FOLIC ACID 1 MG: 1 TABLET ORAL at 08:07

## 2022-04-17 RX ADMIN — IBUPROFEN 400 MG: 100 SUSPENSION ORAL at 09:36

## 2022-04-17 RX ADMIN — ENOXAPARIN SODIUM 30 MG: 100 INJECTION SUBCUTANEOUS at 21:11

## 2022-04-17 RX ADMIN — DEXMEDETOMIDINE HYDROCHLORIDE 0.5 MCG/KG/HR: 4 INJECTION, SOLUTION INTRAVENOUS at 07:15

## 2022-04-17 RX ADMIN — ACETAMINOPHEN 1000 MG: 500 TABLET ORAL at 06:28

## 2022-04-17 RX ADMIN — BACITRACIN: 500 OINTMENT TOPICAL at 13:57

## 2022-04-17 RX ADMIN — FUROSEMIDE 40 MG: 10 INJECTION, SOLUTION INTRAMUSCULAR; INTRAVENOUS at 13:15

## 2022-04-17 RX ADMIN — IBUPROFEN 400 MG: 100 SUSPENSION ORAL at 16:21

## 2022-04-17 RX ADMIN — OXYCODONE HYDROCHLORIDE 5 MG: 5 TABLET ORAL at 08:08

## 2022-04-17 RX ADMIN — DIAZEPAM 5 MG: 5 TABLET ORAL at 16:21

## 2022-04-17 RX ADMIN — DIAZEPAM 5 MG: 5 TABLET ORAL at 21:11

## 2022-04-17 RX ADMIN — METOCLOPRAMIDE 10 MG: 10 TABLET ORAL at 04:22

## 2022-04-17 RX ADMIN — ERYTHROMYCIN: 5 OINTMENT OPHTHALMIC at 21:12

## 2022-04-17 RX ADMIN — BACITRACIN: 500 OINTMENT TOPICAL at 08:07

## 2022-04-17 RX ADMIN — POTASSIUM PHOSPHATE, MONOBASIC AND POTASSIUM PHOSPHATE, DIBASIC 10 MMOL: 224; 236 INJECTION, SOLUTION, CONCENTRATE INTRAVENOUS at 13:58

## 2022-04-17 RX ADMIN — IBUPROFEN 400 MG: 100 SUSPENSION ORAL at 13:10

## 2022-04-17 RX ADMIN — DIAZEPAM 10 MG: 5 TABLET ORAL at 04:31

## 2022-04-17 RX ADMIN — METOCLOPRAMIDE 10 MG: 10 TABLET ORAL at 09:36

## 2022-04-17 RX ADMIN — ACETAMINOPHEN 1000 MG: 500 TABLET ORAL at 13:11

## 2022-04-17 RX ADMIN — DOCUSATE SODIUM 50 MG AND SENNOSIDES 8.6 MG 1 TABLET: 8.6; 5 TABLET, FILM COATED ORAL at 20:59

## 2022-04-17 RX ADMIN — ACETAMINOPHEN 1000 MG: 500 TABLET ORAL at 21:11

## 2022-04-17 RX ADMIN — GABAPENTIN 600 MG: 600 TABLET ORAL at 06:28

## 2022-04-17 RX ADMIN — GABAPENTIN 600 MG: 600 TABLET ORAL at 13:10

## 2022-04-17 RX ADMIN — DEXMEDETOMIDINE HYDROCHLORIDE 0.3 MCG/KG/HR: 4 INJECTION, SOLUTION INTRAVENOUS at 18:32

## 2022-04-17 RX ADMIN — DOCUSATE SODIUM 50 MG AND SENNOSIDES 8.6 MG 1 TABLET: 8.6; 5 TABLET, FILM COATED ORAL at 08:08

## 2022-04-17 RX ADMIN — MAGNESIUM SULFATE IN WATER 4000 MG: 40 INJECTION, SOLUTION INTRAVENOUS at 09:20

## 2022-04-17 RX ADMIN — IBUPROFEN 400 MG: 100 SUSPENSION ORAL at 06:28

## 2022-04-17 RX ADMIN — METOCLOPRAMIDE 10 MG: 10 TABLET ORAL at 20:59

## 2022-04-17 RX ADMIN — BACITRACIN: 500 OINTMENT TOPICAL at 21:11

## 2022-04-17 ASSESSMENT — PULMONARY FUNCTION TESTS
PIF_VALUE: 16
PIF_VALUE: 20
PIF_VALUE: 18
PIF_VALUE: 16
PIF_VALUE: 18
PIF_VALUE: 19
PIF_VALUE: 16
PIF_VALUE: 16

## 2022-04-17 NOTE — PLAN OF CARE
Problem: OXYGENATION/RESPIRATORY FUNCTION  Goal: Patient will maintain patent airway  4/17/2022 1625 by [facility], P  Outcome: Ongoing     Problem: OXYGENATION/RESPIRATORY FUNCTION  Goal: Patient will achieve/maintain normal respiratory rate/effort  Description: Respiratory rate and effort will be within normal limits for the patient  4/17/2022 1625 by [facility], RCP  Outcome: Ongoing     Problem: MECHANICAL VENTILATION  Goal: Patient will maintain patent airway  4/17/2022 1625 by [facility], RCP  Outcome: Ongoing     Problem: MECHANICAL VENTILATION  Goal: Oral health is maintained or improved  4/17/2022 1625 by [facility], RCP  Outcome: Ongoing     Problem: MECHANICAL VENTILATION  Goal: ET tube will be managed safely  4/17/2022 1625 by [facility], RCP  Outcome: Ongoing     Problem: MECHANICAL VENTILATION  Goal: Ability to express needs and understand communication  4/17/2022 1625 by [facility], RCP  Outcome: Ongoing     Problem: MECHANICAL VENTILATION  Goal: Mobility/activity is maintained at optimum level for patient  4/17/2022 1625 by [facility], RCP  Outcome: Ongoing
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
## 2022-04-17 NOTE — PLAN OF CARE
Problem: OXYGENATION/RESPIRATORY FUNCTION  Goal: Patient will maintain patent airway  4/17/2022 0825 by Juvenal Mantilla RN  Outcome: Ongoing     Problem: OXYGENATION/RESPIRATORY FUNCTION  Goal: Patient will achieve/maintain normal respiratory rate/effort  Description: Respiratory rate and effort will be within normal limits for the patient  4/17/2022 0825 by Juvenal Mantilla RN  Outcome: Ongoing     Problem: SKIN INTEGRITY  Goal: Skin integrity is maintained or improved  4/17/2022 0825 by Juvenal Mantilla RN  Outcome: Ongoing     Problem: Non-Violent Restraints  Goal: Removal from restraints as soon as assessed to be safe  4/17/2022 0825 by Juvenal Mantilla RN  Outcome: Ongoing     Problem: Non-Violent Restraints  Goal: No harm/injury to patient while restraints in use  4/17/2022 0825 by Juvenal Mantilla RN  Outcome: Ongoing     Problem: Non-Violent Restraints  Goal: Patient's dignity will be maintained  4/17/2022 0825 by Juvenal Mantilla RN  Outcome: Ongoing     Problem: Skin Integrity:  Goal: Will show no infection signs and symptoms  Description: Will show no infection signs and symptoms  4/17/2022 0825 by Juvenal Mantilla RN  Outcome: Ongoing     Problem: Skin Integrity:  Goal: Absence of new skin breakdown  Description: Absence of new skin breakdown  4/17/2022 0825 by Juvenal Mantilla RN  Outcome: Ongoing     Problem: Falls - Risk of:  Goal: Will remain free from falls  Description: Will remain free from falls  4/17/2022 0825 by Juvenal Mantilla RN  Outcome: Ongoing     Problem: Falls - Risk of:  Goal: Absence of physical injury  Description: Absence of physical injury  4/17/2022 0825 by Juvenal Mantilla RN  Outcome: Ongoing     Problem: Nutrition  Goal: Optimal nutrition therapy  4/17/2022 0825 by Juvenal Mantilla RN  Outcome: Ongoing     Problem: MECHANICAL VENTILATION  Goal: Patient will maintain patent airway  4/17/2022 0825 by Juvenal Mantilla RN  Outcome: Ongoing     Problem: MECHANICAL VENTILATION  Goal: Oral health is maintained or improved  4/17/2022 0825 by Claudene Patch, RN  Outcome: Ongoing     Problem: MECHANICAL VENTILATION  Goal: ET tube will be managed safely  4/17/2022 0825 by Claudene Patch, RN  Outcome: Ongoing     Problem: MECHANICAL VENTILATION  Goal: Ability to express needs and understand communication  4/17/2022 0825 by Claudene Patch, RN  Outcome: Ongoing     Problem: MECHANICAL VENTILATION  Goal: Mobility/activity is maintained at optimum level for patient  4/17/2022 0825 by Claudene Patch, RN  Outcome: Ongoing

## 2022-04-17 NOTE — PLAN OF CARE
Problem: OXYGENATION/RESPIRATORY FUNCTION  Goal: Patient will maintain patent airway  4/17/2022 0641 by Bhavani Cassidy RN  Outcome: Ongoing     Problem: OXYGENATION/RESPIRATORY FUNCTION  Goal: Patient will achieve/maintain normal respiratory rate/effort  Description: Respiratory rate and effort will be within normal limits for the patient  4/17/2022 0641 by Bhavani Cassidy RN  Outcome: Ongoing     Problem: SKIN INTEGRITY  Goal: Skin integrity is maintained or improved  4/17/2022 0641 by Bhavani Cassidy RN  Outcome: Ongoing     Problem: Non-Violent Restraints  Goal: Removal from restraints as soon as assessed to be safe  4/17/2022 0641 by Bhavani Cassidy RN  Outcome: Ongoing     Problem: Non-Violent Restraints  Goal: No harm/injury to patient while restraints in use  4/17/2022 0641 by Bhavani Cassidy RN  Outcome: Ongoing     Problem: Non-Violent Restraints  Goal: Patient's dignity will be maintained  4/17/2022 0641 by Bhavani Cassidy RN  Outcome: Ongoing     Problem: Skin Integrity:  Goal: Will show no infection signs and symptoms  Description: Will show no infection signs and symptoms  4/17/2022 0641 by Bhavani Cassidy RN  Outcome: Ongoing     Problem: Skin Integrity:  Goal: Absence of new skin breakdown  Description: Absence of new skin breakdown  4/17/2022 0641 by Bhavani Cassidy RN  Outcome: Ongoing     Problem: Falls - Risk of:  Goal: Will remain free from falls  Description: Will remain free from falls  4/17/2022 0641 by Bhavani Cassidy RN  Outcome: Ongoing     Problem: Falls - Risk of:  Goal: Absence of physical injury  Description: Absence of physical injury  4/17/2022 0641 by Bhavani Cassidy RN  Outcome: Ongoing     Problem: Nutrition  Goal: Optimal nutrition therapy  4/17/2022 0641 by Bhavani Cassidy RN  Outcome: Ongoing     Problem: MECHANICAL VENTILATION  Goal: Patient will maintain patent airway  4/17/2022 0641 by Bhavani Cassidy RN  Outcome: Ongoing     Problem: MECHANICAL VENTILATION  Goal: Oral health is maintained or improved  4/17/2022 0641 by Patience Alert, RN  Outcome: Ongoing     Problem: MECHANICAL VENTILATION  Goal: ET tube will be managed safely  4/17/2022 0641 by Patience Alert, RN  Outcome: Ongoing     Problem: MECHANICAL VENTILATION  Goal: Ability to express needs and understand communication  4/17/2022 0641 by Patience Alert, RN  Outcome: Ongoing     Problem: MECHANICAL VENTILATION  Goal: Mobility/activity is maintained at optimum level for patient  4/17/2022 0641 by Patience Alert RN  Outcome: Ongoing
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
## 2022-04-18 ENCOUNTER — APPOINTMENT (OUTPATIENT)
Dept: GENERAL RADIOLOGY | Age: 27
DRG: 005 | End: 2022-04-18
Payer: MEDICAID

## 2022-04-18 LAB
ANION GAP SERPL CALCULATED.3IONS-SCNC: 10 MMOL/L (ref 9–17)
BUN BLDV-MCNC: 16 MG/DL (ref 6–20)
CALCIUM SERPL-MCNC: 8.7 MG/DL (ref 8.6–10.4)
CHLORIDE BLD-SCNC: 106 MMOL/L (ref 98–107)
CO2: 25 MMOL/L (ref 20–31)
CREAT SERPL-MCNC: 0.72 MG/DL (ref 0.7–1.2)
FIO2: 30
GFR AFRICAN AMERICAN: >60 ML/MIN
GFR NON-AFRICAN AMERICAN: >60 ML/MIN
GFR SERPL CREATININE-BSD FRML MDRD: ABNORMAL ML/MIN/{1.73_M2}
GLUCOSE BLD-MCNC: 134 MG/DL (ref 70–99)
GLUCOSE BLD-MCNC: 135 MG/DL (ref 74–100)
HCT VFR BLD CALC: 31 % (ref 40.7–50.3)
HEMOGLOBIN: 10.8 G/DL (ref 13–17)
MAGNESIUM: 2 MG/DL (ref 1.6–2.6)
MCH RBC QN AUTO: 34.2 PG (ref 25.2–33.5)
MCHC RBC AUTO-ENTMCNC: 34.8 G/DL (ref 28.4–34.8)
MCV RBC AUTO: 98.1 FL (ref 82.6–102.9)
MODE: ABNORMAL
NRBC AUTOMATED: 0 PER 100 WBC
O2 DEVICE/FLOW/%: ABNORMAL
PDW BLD-RTO: 12.3 % (ref 11.8–14.4)
PHOSPHORUS: 3.4 MG/DL (ref 2.5–4.5)
PLATELET # BLD: 301 K/UL (ref 138–453)
PMV BLD AUTO: 10.2 FL (ref 8.1–13.5)
POC HCO3: 28.4 MMOL/L (ref 21–28)
POC LACTIC ACID: 0.7 MMOL/L (ref 0.56–1.39)
POC O2 SATURATION: 97 % (ref 94–98)
POC PCO2: 45.1 MM HG (ref 35–48)
POC PH: 7.41 (ref 7.35–7.45)
POC PO2: 93.1 MM HG (ref 83–108)
POSITIVE BASE EXCESS, ART: 3 (ref 0–3)
POTASSIUM SERPL-SCNC: 3.9 MMOL/L (ref 3.7–5.3)
RBC # BLD: 3.16 M/UL (ref 4.21–5.77)
SAMPLE SITE: ABNORMAL
SODIUM BLD-SCNC: 141 MMOL/L (ref 135–144)
WBC # BLD: 7.9 K/UL (ref 3.5–11.3)

## 2022-04-18 PROCEDURE — 2580000003 HC RX 258: Performed by: EMERGENCY MEDICINE

## 2022-04-18 PROCEDURE — 80048 BASIC METABOLIC PNL TOTAL CA: CPT

## 2022-04-18 PROCEDURE — 36600 WITHDRAWAL OF ARTERIAL BLOOD: CPT

## 2022-04-18 PROCEDURE — 6370000000 HC RX 637 (ALT 250 FOR IP): Performed by: EMERGENCY MEDICINE

## 2022-04-18 PROCEDURE — 71045 X-RAY EXAM CHEST 1 VIEW: CPT

## 2022-04-18 PROCEDURE — 97530 THERAPEUTIC ACTIVITIES: CPT

## 2022-04-18 PROCEDURE — 97167 OT EVAL HIGH COMPLEX 60 MIN: CPT

## 2022-04-18 PROCEDURE — 83605 ASSAY OF LACTIC ACID: CPT

## 2022-04-18 PROCEDURE — 2000000000 HC ICU R&B

## 2022-04-18 PROCEDURE — 84100 ASSAY OF PHOSPHORUS: CPT

## 2022-04-18 PROCEDURE — 2500000003 HC RX 250 WO HCPCS: Performed by: EMERGENCY MEDICINE

## 2022-04-18 PROCEDURE — 2500000003 HC RX 250 WO HCPCS: Performed by: STUDENT IN AN ORGANIZED HEALTH CARE EDUCATION/TRAINING PROGRAM

## 2022-04-18 PROCEDURE — 85027 COMPLETE CBC AUTOMATED: CPT

## 2022-04-18 PROCEDURE — 2700000000 HC OXYGEN THERAPY PER DAY

## 2022-04-18 PROCEDURE — 83735 ASSAY OF MAGNESIUM: CPT

## 2022-04-18 PROCEDURE — 6370000000 HC RX 637 (ALT 250 FOR IP): Performed by: STUDENT IN AN ORGANIZED HEALTH CARE EDUCATION/TRAINING PROGRAM

## 2022-04-18 PROCEDURE — 36415 COLL VENOUS BLD VENIPUNCTURE: CPT

## 2022-04-18 PROCEDURE — 82803 BLOOD GASES ANY COMBINATION: CPT

## 2022-04-18 PROCEDURE — 94761 N-INVAS EAR/PLS OXIMETRY MLT: CPT

## 2022-04-18 PROCEDURE — 6360000002 HC RX W HCPCS: Performed by: EMERGENCY MEDICINE

## 2022-04-18 PROCEDURE — 94003 VENT MGMT INPAT SUBQ DAY: CPT

## 2022-04-18 PROCEDURE — 97162 PT EVAL MOD COMPLEX 30 MIN: CPT

## 2022-04-18 PROCEDURE — 82947 ASSAY GLUCOSE BLOOD QUANT: CPT

## 2022-04-18 RX ADMIN — CLONIDINE HYDROCHLORIDE 0.1 MG: 0.1 TABLET ORAL at 13:59

## 2022-04-18 RX ADMIN — Medication 10 MG: at 08:33

## 2022-04-18 RX ADMIN — OXYCODONE HYDROCHLORIDE 5 MG: 5 TABLET ORAL at 12:49

## 2022-04-18 RX ADMIN — ENOXAPARIN SODIUM 30 MG: 100 INJECTION SUBCUTANEOUS at 20:38

## 2022-04-18 RX ADMIN — OXYCODONE HYDROCHLORIDE 5 MG: 5 TABLET ORAL at 20:38

## 2022-04-18 RX ADMIN — DEXMEDETOMIDINE HYDROCHLORIDE 0.3 MCG/KG/HR: 4 INJECTION, SOLUTION INTRAVENOUS at 14:26

## 2022-04-18 RX ADMIN — OXYCODONE HYDROCHLORIDE 5 MG: 5 TABLET ORAL at 08:34

## 2022-04-18 RX ADMIN — DEXMEDETOMIDINE HYDROCHLORIDE 0.3 MCG/KG/HR: 4 INJECTION, SOLUTION INTRAVENOUS at 05:38

## 2022-04-18 RX ADMIN — ONDANSETRON 4 MG: 2 INJECTION INTRAMUSCULAR; INTRAVENOUS at 04:22

## 2022-04-18 RX ADMIN — ERYTHROMYCIN: 5 OINTMENT OPHTHALMIC at 20:38

## 2022-04-18 RX ADMIN — DIAZEPAM 5 MG: 5 TABLET ORAL at 22:17

## 2022-04-18 RX ADMIN — IBUPROFEN 400 MG: 100 SUSPENSION ORAL at 12:48

## 2022-04-18 RX ADMIN — IBUPROFEN 400 MG: 100 SUSPENSION ORAL at 03:41

## 2022-04-18 RX ADMIN — DOCUSATE SODIUM 50 MG AND SENNOSIDES 8.6 MG 1 TABLET: 8.6; 5 TABLET, FILM COATED ORAL at 20:30

## 2022-04-18 RX ADMIN — CLONIDINE HYDROCHLORIDE 0.1 MG: 0.1 TABLET ORAL at 08:33

## 2022-04-18 RX ADMIN — FOLIC ACID 1 MG: 1 TABLET ORAL at 08:32

## 2022-04-18 RX ADMIN — KETAMINE HYDROCHLORIDE 0.2 MG/KG/HR: 50 INJECTION INTRAMUSCULAR; INTRAVENOUS at 05:40

## 2022-04-18 RX ADMIN — DIAZEPAM 5 MG: 5 TABLET ORAL at 11:11

## 2022-04-18 RX ADMIN — BACITRACIN: 500 OINTMENT TOPICAL at 20:38

## 2022-04-18 RX ADMIN — FAMOTIDINE 20 MG: 20 TABLET, FILM COATED ORAL at 08:32

## 2022-04-18 RX ADMIN — GABAPENTIN 600 MG: 600 TABLET ORAL at 05:41

## 2022-04-18 RX ADMIN — BACITRACIN: 500 OINTMENT TOPICAL at 14:00

## 2022-04-18 RX ADMIN — IBUPROFEN 400 MG: 100 SUSPENSION ORAL at 16:59

## 2022-04-18 RX ADMIN — METOCLOPRAMIDE 10 MG: 10 TABLET ORAL at 04:20

## 2022-04-18 RX ADMIN — IBUPROFEN 400 MG: 100 SUSPENSION ORAL at 08:33

## 2022-04-18 RX ADMIN — OXYCODONE HYDROCHLORIDE 5 MG: 5 TABLET ORAL at 03:41

## 2022-04-18 RX ADMIN — DIAZEPAM 5 MG: 5 TABLET ORAL at 16:59

## 2022-04-18 RX ADMIN — ACETAMINOPHEN 1000 MG: 500 TABLET ORAL at 05:47

## 2022-04-18 RX ADMIN — QUETIAPINE FUMARATE 100 MG: 100 TABLET ORAL at 08:32

## 2022-04-18 RX ADMIN — BACITRACIN: 500 OINTMENT TOPICAL at 08:34

## 2022-04-18 RX ADMIN — METOCLOPRAMIDE 10 MG: 10 TABLET ORAL at 11:13

## 2022-04-18 RX ADMIN — FENTANYL CITRATE 100 MCG: 50 INJECTION, SOLUTION INTRAMUSCULAR; INTRAVENOUS at 18:40

## 2022-04-18 RX ADMIN — QUETIAPINE FUMARATE 100 MG: 100 TABLET ORAL at 20:30

## 2022-04-18 RX ADMIN — DOCUSATE SODIUM 50 MG AND SENNOSIDES 8.6 MG 1 TABLET: 8.6; 5 TABLET, FILM COATED ORAL at 08:32

## 2022-04-18 RX ADMIN — IBUPROFEN 400 MG: 100 SUSPENSION ORAL at 05:47

## 2022-04-18 RX ADMIN — FAMOTIDINE 20 MG: 20 TABLET, FILM COATED ORAL at 20:30

## 2022-04-18 RX ADMIN — ACETAMINOPHEN 1000 MG: 500 TABLET ORAL at 22:17

## 2022-04-18 RX ADMIN — CLONIDINE HYDROCHLORIDE 0.1 MG: 0.1 TABLET ORAL at 20:30

## 2022-04-18 RX ADMIN — POLYETHYLENE GLYCOL 3350 17 G: 17 POWDER, FOR SOLUTION ORAL at 08:33

## 2022-04-18 RX ADMIN — ACETAMINOPHEN 1000 MG: 500 TABLET ORAL at 13:58

## 2022-04-18 RX ADMIN — ENOXAPARIN SODIUM 30 MG: 100 INJECTION SUBCUTANEOUS at 08:32

## 2022-04-18 RX ADMIN — GABAPENTIN 600 MG: 600 TABLET ORAL at 12:48

## 2022-04-18 RX ADMIN — GABAPENTIN 600 MG: 600 TABLET ORAL at 20:30

## 2022-04-18 RX ADMIN — DIAZEPAM 5 MG: 5 TABLET ORAL at 04:20

## 2022-04-18 RX ADMIN — IBUPROFEN 400 MG: 100 SUSPENSION ORAL at 20:38

## 2022-04-18 ASSESSMENT — PULMONARY FUNCTION TESTS
PIF_VALUE: 16
PIF_VALUE: 14
PIF_VALUE: 18
PIF_VALUE: 17
PIF_VALUE: 17
PIF_VALUE: 18
PIF_VALUE: 17
PIF_VALUE: 17
PIF_VALUE: 16
PIF_VALUE: 19

## 2022-04-18 ASSESSMENT — PAIN SCALES - WONG BAKER: WONGBAKER_NUMERICALRESPONSE: 0
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
## 2022-04-18 NOTE — PROGRESS NOTES
Comprehensive Nutrition Assessment    Type and Reason for Visit:  Reassess    Nutrition Recommendations/Plan: Continue TF as tolerated; suggest increase TF goal to 60 mL/hr now that pt is off propofol. This will provide 2160 kcal and 135 g pro/day. Will continue to monitor TF tolerance/adequacy. Nutrition Assessment:  Chart reviewed. S/p trach and PEG on 4/15/22. Immune Enhancing TF currently at 55 mL/hr. +BM yesterday. Meds/labs reviewed. Malnutrition Assessment:  Malnutrition Status: At risk for malnutrition  Context:  Acute Illness     Findings of the 6 clinical characteristics of malnutrition:  Energy Intake:  No significant decrease in energy intake (with use of TF)  Weight Loss:  No significant weight loss     Body Fat Loss:  No significant body fat loss     Muscle Mass Loss:  No significant muscle mass loss    Fluid Accumulation:  1 - Mild Extremities,Generalized   Strength:  Not Performed    Estimated Daily Nutrient Needs:  Energy (kcal):  2200 kcal/day; Weight Used for Energy Requirements:  Current     Protein (g):  120-160 g pro/day; Weight Used for Protein Requirements:  Ideal (1.5-2)        Fluid (ml/day):  Per MD; Method Used for Fluid Requirements:  Other (Comment)      Nutrition Related Findings:  Last BM noted: 4/17/22. Wounds:  None       Current Nutrition Therapies:    Diet NPO  ADULT TUBE FEEDING; Nasogastric; Immune Enhancing; Continuous; 25; Yes; 10; Q 4 hours; 55; 30; Q 4 hours  Current Tube Feeding (TF) Orders:  · Feeding Route: PEG  · Formula: Immune Enhancing  · Schedule: Continuous at 55 mL/hr  · Water Flushes: 30 mL every 4 hrs  · Current TF & Flush Orders Provides: Pivot 1.5 at 55 mL/hr =1980 kcal, 124 g pro, 990 mL water per day. Additional 180 mL free water per day if 30 mL given every 4 hrs. · Goal TF & Flush Orders Provides: Pivot 1.5 at 60 mL/hr =2160 kcal, 135 g pro, 1080 mL free water/day. Additional 180 mL water/day if 30 mL given every 4 hrs.     Additional Calorie Sources:   none    Anthropometric Measures:  · Height: 5' 11\" (180.3 cm)  · Current Body Weight: 235 lb 3.7 oz (106.7 kg)   · Admission Body Weight: 235 lb 3.7 oz (106.7 kg) (4/12, [facility])    · Usual Body Weight:  (UTO)     · Ideal Body Weight: 172 lbs; % Ideal Body Weight 136.8 %   · BMI: 32.8   · BMI Categories: Obese Class 1 (BMI 30.0-34. 9)       Nutrition Diagnosis:   · Inadequate oral intake related to impaired respiratory function as evidenced by NPO or clear liquid status due to medical condition,nutrition support - enteral nutrition    Nutrition Interventions:   Food and/or Nutrient Delivery:  Continue TF as tolerated; suggest increase TF goal to 60 mL/hr now that pt is off propofol. This will provide 2160 kcal and 135 g pro/day. Nutrition Education/Counseling:  No recommendation at this time   Coordination of Nutrition Care:  Continue to monitor while inpatient    Goals:  meet % of estimated nutrient needs -goal achieved       Nutrition Monitoring and Evaluation:   Behavioral-Environmental Outcomes:  None Identified   Food/Nutrient Intake Outcomes:  Enteral Nutrition Intake/Tolerance  Physical Signs/Symptoms Outcomes:  Biochemical Data,Nutrition Focused Physical Findings,Skin,Weight     Discharge Planning:     Too soon to determine     Electronically signed by Annalee Palomino RD, JESSICA on 4/18/22 at 12:09 PM EDT    Contact: [phone]
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
## 2022-04-18 NOTE — PLAN OF CARE
Nutrition Problem #1: Inadequate oral intake  Intervention: Food and/or Nutrient Delivery:  (Continue TF as tolerated; suggest increase TF goal to 60 mL/hr now that pt is off propofol.  This will provide 2160 kcal and 135 g pro/day.)  Nutritional Goals: meet % of estimated nutrient needs

## 2022-04-18 NOTE — PLAN OF CARE
Problem: OXYGENATION/RESPIRATORY FUNCTION  Goal: Patient will maintain patent airway  4/18/2022 0148 by Carlos Arvizu RN  Outcome: Ongoing     Problem: OXYGENATION/RESPIRATORY FUNCTION  Goal: Patient will achieve/maintain normal respiratory rate/effort  Description: Respiratory rate and effort will be within normal limits for the patient  4/18/2022 0148 by Cralos Arvizu RN  Outcome: Ongoing     Problem: SKIN INTEGRITY  Goal: Skin integrity is maintained or improved  Outcome: Ongoing     Problem: Non-Violent Restraints  Goal: Removal from restraints as soon as assessed to be safe  Outcome: Ongoing     Problem: Non-Violent Restraints  Goal: No harm/injury to patient while restraints in use  Outcome: Ongoing     Problem: Non-Violent Restraints  Goal: Patient's dignity will be maintained  Outcome: Ongoing     Problem: Skin Integrity:  Goal: Will show no infection signs and symptoms  Description: Will show no infection signs and symptoms  Outcome: Ongoing     Problem: Skin Integrity:  Goal: Absence of new skin breakdown  Description: Absence of new skin breakdown  Outcome: Ongoing     Problem: Falls - Risk of:  Goal: Will remain free from falls  Description: Will remain free from falls  Outcome: Ongoing     Problem: Falls - Risk of:  Goal: Absence of physical injury  Description: Absence of physical injury  Outcome: Ongoing     Problem: Nutrition  Goal: Optimal nutrition therapy  Outcome: Ongoing     Problem: MECHANICAL VENTILATION  Goal: Patient will maintain patent airway  4/18/2022 0148 by Carlos Arvizu RN  Outcome: Ongoing     Problem: MECHANICAL VENTILATION  Goal: Oral health is maintained or improved  4/18/2022 0148 by Carlos Arvizu RN  Outcome: Ongoing     Problem: MECHANICAL VENTILATION  Goal: ET tube will be managed safely  4/18/2022 0148 by Carlos Arvizu RN  Outcome: Ongoing     Problem: MECHANICAL VENTILATION  Goal: Ability to express needs and understand communication  4/18/2022 0148 by Carlos Arvizu RN  Outcome: Ongoing     Problem: MECHANICAL VENTILATION  Goal: Mobility/activity is maintained at optimum level for patient  4/18/2022 0148 by Calixto Albert RN  Outcome: Ongoing
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
## 2022-04-18 NOTE — PLAN OF CARE
Problem: OXYGENATION/RESPIRATORY FUNCTION  Goal: Patient will maintain patent airway  4/18/2022 1349 by Jerene Claude, RN  Outcome: Ongoing  4/18/2022 1158 by Chelsey Nesbitt RCP  Outcome: Ongoing  4/18/2022 0148 by Tabitha Tejeda RN  Outcome: Ongoing  Goal: Patient will achieve/maintain normal respiratory rate/effort  Description: Respiratory rate and effort will be within normal limits for the patient  4/18/2022 1349 by Jerene Claude, RN  Outcome: Ongoing  4/18/2022 1158 by Chelsey Nesbitt RCP  Outcome: Ongoing  4/18/2022 0148 by Tabitha Tejeda RN  Outcome: Ongoing     Problem: SKIN INTEGRITY  Goal: Skin integrity is maintained or improved  4/18/2022 1349 by Jerene Claude, RN  Outcome: Ongoing  4/18/2022 0148 by Tabitha Tejeda RN  Outcome: Ongoing     Problem: Non-Violent Restraints  Goal: Removal from restraints as soon as assessed to be safe  4/18/2022 1349 by Jerene Claude, RN  Outcome: Ongoing  4/18/2022 0148 by Tabitha Tejeda RN  Outcome: Ongoing  Goal: No harm/injury to patient while restraints in use  4/18/2022 1349 by Jerene Claude, RN  Outcome: Ongoing  4/18/2022 0148 by Tabitha Tejeda RN  Outcome: Ongoing  Goal: Patient's dignity will be maintained  4/18/2022 1349 by Jerene Claude, RN  Outcome: Ongoing  4/18/2022 0148 by Tabitha Tejeda RN  Outcome: Ongoing     Problem: Skin Integrity:  Goal: Will show no infection signs and symptoms  Description: Will show no infection signs and symptoms  4/18/2022 1349 by Jerene Claude, RN  Outcome: Ongoing  4/18/2022 1158 by Chelsey Nesbitt RCP  Outcome: Ongoing  4/18/2022 0148 by Tabitha Tejeda RN  Outcome: Ongoing  Goal: Absence of new skin breakdown  Description: Absence of new skin breakdown  4/18/2022 1349 by Jerene Claude, RN  Outcome: Ongoing  4/18/2022 0148 by Tabitha Tejeda RN  Outcome: Ongoing     Problem: Falls - Risk of:  Goal: Will remain free from falls  Description: Will remain free from falls  4/18/2022 1349 by Elise Goldberg Marshall Hutchins RN  Outcome: Ongoing  4/18/2022 0148 by Carlos Arvizu RN  Outcome: Ongoing  Goal: Absence of physical injury  Description: Absence of physical injury  4/18/2022 1349 by Corrine Gordon RN  Outcome: Ongoing  4/18/2022 0148 by Carlos Arvizu RN  Outcome: Ongoing     Problem: Nutrition  Goal: Optimal nutrition therapy  4/18/2022 1349 by Corrine Gordon RN  Outcome: Ongoing  4/18/2022 1212 by Kirsten Ball RD, LD  Outcome: Ongoing  Note: Nutrition Problem #1: Inadequate oral intake  Intervention: Food and/or Nutrient Delivery:  (Continue TF as tolerated; suggest increase TF goal to 60 mL/hr now that pt is off propofol.  This will provide 2160 kcal and 135 g pro/day.)  Nutritional Goals: meet % of estimated nutrient needs    4/18/2022 0148 by Carlos Arvizu RN  Outcome: Ongoing     Problem: MECHANICAL VENTILATION  Goal: Patient will maintain patent airway  4/18/2022 1349 by Corrine Gordon RN  Outcome: Ongoing  4/18/2022 1158 by Alla Martínez RCP  Outcome: Ongoing  4/18/2022 0148 by Carlos Arvizu RN  Outcome: Ongoing  Goal: Oral health is maintained or improved  4/18/2022 1349 by Corrine Gordon RN  Outcome: Ongoing  4/18/2022 1158 by Alla Martínez RCP  Outcome: Ongoing  4/18/2022 0148 by Carlos Arvizu RN  Outcome: Ongoing  Goal: ET tube will be managed safely  4/18/2022 1349 by Corrine Gordon RN  Outcome: Ongoing  4/18/2022 1158 by Alla Martínez RCP  Outcome: Ongoing  4/18/2022 0148 by Carlos Arvizu RN  Outcome: Ongoing  Goal: Ability to express needs and understand communication  4/18/2022 1349 by Corrine Gordon RN  Outcome: Ongoing  4/18/2022 1158 by Alla Martínez RCP  Outcome: Ongoing  4/18/2022 0148 by Carlos Arvizu RN  Outcome: Ongoing  Goal: Mobility/activity is maintained at optimum level for patient  4/18/2022 1349 by Corrine Gordon RN  Outcome: Ongoing  4/18/2022 1158 by Alla Lions, RCP  Outcome: Ongoing  4/18/2022 0148 by Carlos Arvizu RN  Outcome: Ongoing
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
## 2022-04-18 NOTE — PLAN OF CARE
Problem: OXYGENATION/RESPIRATORY FUNCTION  Goal: Patient will maintain patent airway  4/18/2022 1158 by aSlima Grant RCP  Outcome: Ongoing     Problem: OXYGENATION/RESPIRATORY FUNCTION  Goal: Patient will achieve/maintain normal respiratory rate/effort  Description: Respiratory rate and effort will be within normal limits for the patient  4/18/2022 1158 by Salima Grant RCP  Outcome: Ongoing     Problem: MECHANICAL VENTILATION  Goal: Patient will maintain patent airway  4/18/2022 1158 by Salima Grant RCP  Outcome: Ongoing     Problem: MECHANICAL VENTILATION  Goal: Oral health is maintained or improved  4/18/2022 1158 by Salima Grant RCP  Outcome: Ongoing     Problem: MECHANICAL VENTILATION  Goal: ET tube will be managed safely  4/18/2022 1158 by Salima Grant RCP  Outcome: Ongoing     Problem: MECHANICAL VENTILATION  Goal: Ability to express needs and understand communication  4/18/2022 1158 by Salima Grant RCP  Outcome: Ongoing     Problem: MECHANICAL VENTILATION  Goal: Mobility/activity is maintained at optimum level for patient  4/18/2022 1158 by Salima Grant RCP  Outcome: Ongoing     Problem: Skin Integrity:  Goal: Will show no infection signs and symptoms  Description: Will show no infection signs and symptoms  4/18/2022 1158 by Salima Grant RCP  Outcome: Ongoing    Problem: Skin Integrity:  Goal: Will show no infection signs and symptoms  Description: Will show no infection signs and symptoms  4/18/2022 1158 by Salima Grant RCP  Outcome: Ongoing
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
## 2022-04-19 ENCOUNTER — APPOINTMENT (OUTPATIENT)
Dept: GENERAL RADIOLOGY | Age: 27
DRG: 005 | End: 2022-04-19
Payer: MEDICAID

## 2022-04-19 LAB
ALLEN TEST: POSITIVE
ANION GAP SERPL CALCULATED.3IONS-SCNC: 14 MMOL/L (ref 9–17)
BUN BLDV-MCNC: 20 MG/DL (ref 6–20)
CALCIUM SERPL-MCNC: 9.2 MG/DL (ref 8.6–10.4)
CHLORIDE BLD-SCNC: 107 MMOL/L (ref 98–107)
CO2: 25 MMOL/L (ref 20–31)
CREAT SERPL-MCNC: 0.78 MG/DL (ref 0.7–1.2)
FIO2: 30
FIO2: 40
GFR AFRICAN AMERICAN: >60 ML/MIN
GFR NON-AFRICAN AMERICAN: >60 ML/MIN
GFR SERPL CREATININE-BSD FRML MDRD: ABNORMAL ML/MIN/{1.73_M2}
GLUCOSE BLD-MCNC: 115 MG/DL (ref 70–99)
GLUCOSE BLD-MCNC: 126 MG/DL (ref 74–100)
HCO3, MIXED: 26.8 MMOL/L (ref 23–29)
HCT VFR BLD CALC: 34.9 % (ref 40.7–50.3)
HEMOGLOBIN: 11.4 G/DL (ref 13–17)
MAGNESIUM: 1.9 MG/DL (ref 1.6–2.6)
MCH RBC QN AUTO: 32.8 PG (ref 25.2–33.5)
MCHC RBC AUTO-ENTMCNC: 32.7 G/DL (ref 28.4–34.8)
MCV RBC AUTO: 100.3 FL (ref 82.6–102.9)
MODE: ABNORMAL
MODE: NORMAL
NRBC AUTOMATED: 0 PER 100 WBC
O2 DEVICE/FLOW/%: ABNORMAL
O2 DEVICE/FLOW/%: NORMAL
O2 SAT, MIXED: 96 % (ref 60–80)
PCO2 MIXED: 40 MM HG (ref 42–52)
PDW BLD-RTO: 12.3 % (ref 11.8–14.4)
PH, MIXED: 7.43 (ref 7.31–7.41)
PHOSPHORUS: 4.4 MG/DL (ref 2.5–4.5)
PLATELET # BLD: 234 K/UL (ref 138–453)
PMV BLD AUTO: 9.3 FL (ref 8.1–13.5)
PO2 MIXED: 78.5 MM HG (ref 35–45)
POC HCO3: 27 MMOL/L (ref 21–28)
POC LACTIC ACID: 0.56 MMOL/L (ref 0.56–1.39)
POC LACTIC ACID: 0.63 MMOL/L (ref 0.56–1.39)
POC O2 SATURATION: 97 % (ref 94–98)
POC PCO2: 39.2 MM HG (ref 35–48)
POC PH: 7.45 (ref 7.35–7.45)
POC PO2: 84.2 MM HG (ref 83–108)
POSITIVE BASE EXCESS, ART: 3 (ref 0–3)
POSITIVE BASE EXCESS, MIXED: 2 (ref 0–3)
POTASSIUM SERPL-SCNC: 4.5 MMOL/L (ref 3.7–5.3)
RBC # BLD: 3.48 M/UL (ref 4.21–5.77)
SAMPLE SITE: ABNORMAL
SAMPLE SITE: NORMAL
SODIUM BLD-SCNC: 146 MMOL/L (ref 135–144)
WBC # BLD: 8.8 K/UL (ref 3.5–11.3)

## 2022-04-19 PROCEDURE — 2700000000 HC OXYGEN THERAPY PER DAY

## 2022-04-19 PROCEDURE — 82803 BLOOD GASES ANY COMBINATION: CPT

## 2022-04-19 PROCEDURE — 6370000000 HC RX 637 (ALT 250 FOR IP): Performed by: EMERGENCY MEDICINE

## 2022-04-19 PROCEDURE — 2580000003 HC RX 258: Performed by: EMERGENCY MEDICINE

## 2022-04-19 PROCEDURE — 6370000000 HC RX 637 (ALT 250 FOR IP): Performed by: STUDENT IN AN ORGANIZED HEALTH CARE EDUCATION/TRAINING PROGRAM

## 2022-04-19 PROCEDURE — 51798 US URINE CAPACITY MEASURE: CPT

## 2022-04-19 PROCEDURE — 2500000003 HC RX 250 WO HCPCS: Performed by: STUDENT IN AN ORGANIZED HEALTH CARE EDUCATION/TRAINING PROGRAM

## 2022-04-19 PROCEDURE — 80048 BASIC METABOLIC PNL TOTAL CA: CPT

## 2022-04-19 PROCEDURE — 84100 ASSAY OF PHOSPHORUS: CPT

## 2022-04-19 PROCEDURE — 36415 COLL VENOUS BLD VENIPUNCTURE: CPT

## 2022-04-19 PROCEDURE — 71045 X-RAY EXAM CHEST 1 VIEW: CPT

## 2022-04-19 PROCEDURE — 99253 IP/OBS CNSLTJ NEW/EST LOW 45: CPT | Performed by: STUDENT IN AN ORGANIZED HEALTH CARE EDUCATION/TRAINING PROGRAM

## 2022-04-19 PROCEDURE — 6360000002 HC RX W HCPCS: Performed by: EMERGENCY MEDICINE

## 2022-04-19 PROCEDURE — 36600 WITHDRAWAL OF ARTERIAL BLOOD: CPT

## 2022-04-19 PROCEDURE — 83735 ASSAY OF MAGNESIUM: CPT

## 2022-04-19 PROCEDURE — 85027 COMPLETE CBC AUTOMATED: CPT

## 2022-04-19 PROCEDURE — 51701 INSERT BLADDER CATHETER: CPT

## 2022-04-19 PROCEDURE — 83605 ASSAY OF LACTIC ACID: CPT

## 2022-04-19 PROCEDURE — 94003 VENT MGMT INPAT SUBQ DAY: CPT

## 2022-04-19 PROCEDURE — 2000000000 HC ICU R&B

## 2022-04-19 PROCEDURE — 82947 ASSAY GLUCOSE BLOOD QUANT: CPT

## 2022-04-19 PROCEDURE — 94761 N-INVAS EAR/PLS OXIMETRY MLT: CPT

## 2022-04-19 RX ORDER — IBUPROFEN 400 MG/1
400 TABLET ORAL EVERY 4 HOURS
Status: DISCONTINUED | OUTPATIENT
Start: 2022-04-19 | End: 2022-04-24

## 2022-04-19 RX ORDER — QUETIAPINE FUMARATE 25 MG/1
50 TABLET, FILM COATED ORAL ONCE
Status: COMPLETED | OUTPATIENT
Start: 2022-04-19 | End: 2022-04-19

## 2022-04-19 RX ORDER — SODIUM CHLORIDE, SODIUM LACTATE, POTASSIUM CHLORIDE, CALCIUM CHLORIDE 600; 310; 30; 20 MG/100ML; MG/100ML; MG/100ML; MG/100ML
INJECTION, SOLUTION INTRAVENOUS CONTINUOUS
Status: DISCONTINUED | OUTPATIENT
Start: 2022-04-19 | End: 2022-04-19

## 2022-04-19 RX ORDER — SODIUM CHLORIDE 9 MG/ML
INJECTION, SOLUTION INTRAVENOUS CONTINUOUS
Status: DISCONTINUED | OUTPATIENT
Start: 2022-04-19 | End: 2022-04-27

## 2022-04-19 RX ADMIN — DIAZEPAM 5 MG: 5 TABLET ORAL at 04:38

## 2022-04-19 RX ADMIN — IBUPROFEN 400 MG: 100 SUSPENSION ORAL at 02:04

## 2022-04-19 RX ADMIN — QUETIAPINE FUMARATE 100 MG: 100 TABLET ORAL at 08:35

## 2022-04-19 RX ADMIN — BACITRACIN: 500 OINTMENT TOPICAL at 20:07

## 2022-04-19 RX ADMIN — FAMOTIDINE 20 MG: 20 TABLET, FILM COATED ORAL at 08:35

## 2022-04-19 RX ADMIN — DEXMEDETOMIDINE HYDROCHLORIDE 0.5 MCG/KG/HR: 4 INJECTION, SOLUTION INTRAVENOUS at 09:41

## 2022-04-19 RX ADMIN — FOLIC ACID 1 MG: 1 TABLET ORAL at 08:35

## 2022-04-19 RX ADMIN — OXYCODONE HYDROCHLORIDE 5 MG: 5 TABLET ORAL at 02:05

## 2022-04-19 RX ADMIN — DIAZEPAM 5 MG: 5 TABLET ORAL at 08:35

## 2022-04-19 RX ADMIN — ACETAMINOPHEN 1000 MG: 500 TABLET ORAL at 14:00

## 2022-04-19 RX ADMIN — ERYTHROMYCIN: 5 OINTMENT OPHTHALMIC at 19:50

## 2022-04-19 RX ADMIN — OXYCODONE HYDROCHLORIDE 5 MG: 5 TABLET ORAL at 19:56

## 2022-04-19 RX ADMIN — CLONIDINE HYDROCHLORIDE 0.1 MG: 0.1 TABLET ORAL at 20:06

## 2022-04-19 RX ADMIN — SODIUM CHLORIDE, PRESERVATIVE FREE 10 ML: 5 INJECTION INTRAVENOUS at 20:06

## 2022-04-19 RX ADMIN — QUETIAPINE FUMARATE 150 MG: 100 TABLET ORAL at 20:06

## 2022-04-19 RX ADMIN — BACITRACIN: 500 OINTMENT TOPICAL at 14:02

## 2022-04-19 RX ADMIN — IBUPROFEN 400 MG: 400 TABLET, FILM COATED ORAL at 14:01

## 2022-04-19 RX ADMIN — SODIUM CHLORIDE, PRESERVATIVE FREE 10 ML: 5 INJECTION INTRAVENOUS at 19:50

## 2022-04-19 RX ADMIN — SODIUM CHLORIDE, PRESERVATIVE FREE 10 ML: 5 INJECTION INTRAVENOUS at 08:37

## 2022-04-19 RX ADMIN — OXYCODONE HYDROCHLORIDE 5 MG: 5 TABLET ORAL at 08:35

## 2022-04-19 RX ADMIN — CLONIDINE HYDROCHLORIDE 0.1 MG: 0.1 TABLET ORAL at 08:35

## 2022-04-19 RX ADMIN — BACITRACIN: 500 OINTMENT TOPICAL at 08:35

## 2022-04-19 RX ADMIN — OXYCODONE HYDROCHLORIDE 5 MG: 5 TABLET ORAL at 14:00

## 2022-04-19 RX ADMIN — IBUPROFEN 400 MG: 400 TABLET, FILM COATED ORAL at 18:58

## 2022-04-19 RX ADMIN — ENOXAPARIN SODIUM 30 MG: 100 INJECTION SUBCUTANEOUS at 20:06

## 2022-04-19 RX ADMIN — ENOXAPARIN SODIUM 30 MG: 100 INJECTION SUBCUTANEOUS at 08:35

## 2022-04-19 RX ADMIN — CLONIDINE HYDROCHLORIDE 0.1 MG: 0.1 TABLET ORAL at 14:01

## 2022-04-19 RX ADMIN — FENTANYL CITRATE 100 MCG: 50 INJECTION, SOLUTION INTRAMUSCULAR; INTRAVENOUS at 02:05

## 2022-04-19 RX ADMIN — GABAPENTIN 600 MG: 600 TABLET ORAL at 14:01

## 2022-04-19 RX ADMIN — DEXMEDETOMIDINE HYDROCHLORIDE 0.4 MCG/KG/HR: 4 INJECTION, SOLUTION INTRAVENOUS at 00:16

## 2022-04-19 RX ADMIN — QUETIAPINE FUMARATE 50 MG: 100 TABLET ORAL at 14:00

## 2022-04-19 RX ADMIN — GABAPENTIN 600 MG: 600 TABLET ORAL at 06:41

## 2022-04-19 RX ADMIN — IBUPROFEN 400 MG: 400 TABLET, FILM COATED ORAL at 08:35

## 2022-04-19 RX ADMIN — IBUPROFEN 400 MG: 100 SUSPENSION ORAL at 06:41

## 2022-04-19 RX ADMIN — GABAPENTIN 600 MG: 600 TABLET ORAL at 20:07

## 2022-04-19 RX ADMIN — ACETAMINOPHEN 1000 MG: 500 TABLET ORAL at 06:41

## 2022-04-19 RX ADMIN — SODIUM CHLORIDE 500 ML: 9 INJECTION, SOLUTION INTRAVENOUS at 08:20

## 2022-04-19 RX ADMIN — DIAZEPAM 5 MG: 5 TABLET ORAL at 22:04

## 2022-04-19 RX ADMIN — DIAZEPAM 5 MG: 5 TABLET ORAL at 16:22

## 2022-04-19 RX ADMIN — FAMOTIDINE 20 MG: 20 TABLET, FILM COATED ORAL at 20:07

## 2022-04-19 RX ADMIN — IBUPROFEN 400 MG: 400 TABLET, FILM COATED ORAL at 22:03

## 2022-04-19 RX ADMIN — ACETAMINOPHEN 1000 MG: 500 TABLET ORAL at 22:02

## 2022-04-19 RX ADMIN — SODIUM CHLORIDE, PRESERVATIVE FREE 10 ML: 5 INJECTION INTRAVENOUS at 20:10

## 2022-04-19 ASSESSMENT — PULMONARY FUNCTION TESTS
PIF_VALUE: 9
PIF_VALUE: 17
PIF_VALUE: 10
PIF_VALUE: 17
PIF_VALUE: 12
PIF_VALUE: 17
PIF_VALUE: 13
PIF_VALUE: 17
PIF_VALUE: 14
PIF_VALUE: 16
PIF_VALUE: 15
PIF_VALUE: 10
PIF_VALUE: 12
PIF_VALUE: 16

## 2022-04-19 ASSESSMENT — PAIN SCALES - GENERAL: PAINLEVEL_OUTOF10: 5

## 2022-04-19 NOTE — CARE COORDINATION
Transition planning  Spoke with patient's mother and girlfriend at bedside re: LTACH vs. Rehab choices dependent on patient's progress. Patient has trach and PEG, on vent. Patient's mother reviews LTACH choices and chooses [facility]. She will review ARU list for choices. Dr. Murillo Number at bedside, states she will follow pt's progress for ARU.

## 2022-04-19 NOTE — CONSULTS
balance/dereased ability to follow commands       Transfers  Comment: unsafe to attempt due to poor sitting balance/dereased ability to follow commands  Ambulation  Ambulation?: No                 OT:   ADL  Feeding: Setup,Verbal cueing,Increased time to complete,Moderate assistance  Grooming: Increased time to complete,Setup,Verbal cueing,Maximum assistance  UE Bathing: Maximum assistance,Increased time to complete,Setup,Verbal cueing  LE Bathing: Maximum assistance,Increased time to complete,Setup,Verbal cueing  UE Dressing: Increased time to complete,Setup,Verbal cueing,Maximum assistance  LE Dressing: Increased time to complete,Maximum assistance,Setup,Verbal cueing  Toileting: Setup,Increased time to complete,Maximum assistance  Additional Comments: Patient limited to engage in ADLs this date d/t cognitive deficits, impacting ability to follow commands and engage in tasks. Pt limited d/t weakness on the L side, impacting performance and safety with functional tasks. Balance  Sitting Balance: Maximum assistance (intermittent Mod A with posterior and lateral leans; Improved balance with use of the R UE as support on bedrail.  Tolerated ~10-11 min;)  Standing Balance: Unable to assess(comment) (d/t decreased safety awareness and L sided weakness.)            Bed mobility  Rolling to Left: Maximum assistance,2 Person assistance  Supine to Sit: Maximum assistance,2 Person assistance  Sit to Supine: 2 Person assistance,Maximum assistance  Scootin Person assistance,Maximal assistance  Comment: verbal and tactile cues for sequencing of bed mobility with poor return  Transfers  Sit to stand: Unable to assess  Stand to sit: Unable to assess                 SLP:      Past Medical History:    Bipolar disorder  Alcohol use    Past Surgical History:        Procedure Laterality Date    TRACHEOSTOMY N/A 4/15/2022    TRACHEOSTOMY performed by Mary Coe MD at [place] [id number] injection 5-40 mL, 5-40 mL, IntraVENous, 2 times per day  sodium chloride flush 0.9 % injection 5-40 mL, 5-40 mL, IntraVENous, PRN  0.9 % sodium chloride infusion, , IntraVENous, PRN  propofol injection, 5-50 mcg/kg/min, IntraVENous, Continuous  lidocaine 1 % injection 10 mL, 10 mL, IntraDERmal, Once  folic acid (FOLVITE) tablet 1 mg, 1 mg, Per NG tube, Daily    Family History:   No family history on file. Social History:  Lives with girlfriend or mother  Home setup at girlfriend's home:   Floors in home:  1. Bed/bathroom on floor:  main. Steps into home:  0. Social History     Socioeconomic History    Marital status: Single     Spouse name: Not on file    Number of children: Not on file    Years of education: Not on file    Highest education level: Not on file   Occupational History    Not on file   Tobacco Use    Smoking status: Not on file    Smokeless tobacco: Not on file   Substance and Sexual Activity    Alcohol use: Not on file    Drug use: Not on file    Sexual activity: Not on file   Other Topics Concern    Not on file   Social History Narrative    Not on file     Social Determinants of Health     Financial Resource Strain:     Difficulty of Paying Living Expenses: Not on file   Food Insecurity:     Worried About Running Out of Food in the Last Year: Not on file    Heidy of Food in the Last Year: Not on file   Transportation Needs:     Lack of Transportation (Medical): Not on file    Lack of Transportation (Non-Medical):  Not on file   Physical Activity:     Days of Exercise per Week: Not on file    Minutes of Exercise per Session: Not on file   Stress:     Feeling of Stress : Not on file   Social Connections:     Frequency of Communication with Friends and Family: Not on file    Frequency of Social Gatherings with Friends and Family: Not on file    Attends Nondenominational Services: Not on file    Active Member of Clubs or Organizations: Not on file    Attends Club or Organization Meetings: Not on file    Marital Status: Not on file   Intimate Partner Violence:     Fear of Current or Ex-Partner: Not on file    Emotionally Abused: Not on file    Physically Abused: Not on file    Sexually Abused: Not on file   Housing Stability:     Unable to Pay for Housing in the Last Year: Not on file    Number of [place] in the Last Year: Not on file    Unstable Housing in the Last Year: Not on file       Physical Exam:  BP (!) 148/95   Pulse 75   Temp 99.1 °F (37.3 °C) (Oral)   Resp 27   Ht 5' 11\" (1.803 m)   Wt 235 lb 3.7 oz (106.7 kg)   SpO2 98%   BMI 32.81 kg/m²     GEN: Well developed, well nourished, no acute distress  HEENT: Normocephalic. Eyes closed throughout evaluation. Unable to assess hearing. Trach in place, on ventilator. RESP: Normal breath sounds with no wheezing, rales, or rhonchi. Respirations WNL and unlabored. CV: Regular rate and rhythm. No murmurs, rubs, or gallops. ABD: Soft, non-distended, BS+ and equal.  NEURO:  Sedated. Unable to assess speech. Unable to assess sensation. No spasticity noted in upper or lower limbs. MSK:  Muscle bulk is normal bilaterally. Unable to assess strength. LIMBS: No edema in bilateral lower limbs. SKIN: Warm and dry with good turgor. PSYCH: Unable to assess. Diagnostics:    CBC:   Recent Labs     04/17/22  0728 04/18/22  0646 04/19/22  0649   WBC 7.4 7.9 8.8   RBC 3.48* 3.16* 3.48*   HGB 11.4* 10.8* 11.4*   HCT 33.7* 31.0* 34.9*   MCV 96.8 98.1 100.3   RDW 12.4 12.3 12.3    301 234     BMP:   Recent Labs     04/17/22  0728 04/18/22  0646 04/19/22  0649    141 146*   K 4.2 3.9 4.5   * 106 107   CO2 25 25 25   PHOS 2.7 3.4 4.4   BUN 13 16 20   CREATININE 0.70 0.72 0.78   GLUCOSE 108* 134* 115*      HbA1c: No results found for: LABA1C  BNP: No results for input(s): BNP in the last 72 hours. PT/INR: No results for input(s): PROTIME, INR in the last 72 hours. APTT: No results for input(s):  APTT in the last 72 hours. CARDIAC ENZYMES: No results for input(s): CKMB, CKMBINDEX, TROPONINT in the last 72 hours. Invalid input(s): CKTOTAL;3  FASTING LIPID PANEL:No results found for: CHOL, HDL, TRIG  LIVER PROFILE: No results for input(s): AST, ALT, ALB, BILIDIR, BILITOT, ALKPHOS in the last 72 hours. Radiology:  XR CHEST PORTABLE   Final Result   1. Pulmonary vascular congestion with a slight increase in bibasilar   atelectasis. XR CHEST PORTABLE   Final Result   No significant interval change. A tracheostomy cannula remains. XR CHEST PORTABLE   Final Result   1. Tracheostomy tube as above. 2. Mild pulmonary vascular congestion. XR CHEST PORTABLE   Final Result   Stable tracheostomy. Mild congestion. No other acute disease. XR CHEST PORTABLE   Final Result   Unremarkable appearing tracheostomy tube. XR CHEST PORTABLE   Final Result   No acute airspace disease. MRI BRAIN WO CONTRAST   Final Result   Several tiny foci of susceptibility artifact within the left frontal lobe   subcortical white matter. These changes are suspicious for tiny   microhemorrhages which may indicate mild axonal injury. Mild cerebral atrophy most apparent within the frontal lobes which is more   than expected for the patient's age. Moderate to severe paranasal sinus disease which may be acute or chronic. XR CHEST PORTABLE   Final Result   No acute cardiopulmonary process. Endotracheal tube in place with tip 6.4 cm above the johny. XR CHEST PORTABLE   Final Result   Endotracheal tube with the tip properly placed above the johny. No acute   cardiopulmonary findings. XR CHEST PORTABLE   Final Result   No acute airspace disease. XR ABDOMEN FOR NG/OG/NE TUBE PLACEMENT   Final Result      1. NG tube tip is in the body of the stomach and side port is in the fundus   of the stomach. 2.  Nonspecific bowel gas pattern.          XR CHEST PORTABLE   Final Result   Stable appearance of the chest with no acute airspace finding. CT HEAD WO CONTRAST   Final Result   Stable scattered multifocal areas of subarachnoid hemorrhage compared to the   exam 6 hours earlier. No new hemorrhage. No suggestion of developing   cerebral edema. Unchanged nearly diffuse left scalp hematoma. Increased left lateral   periorbital hematoma although this may represent redistribution. Sinus mucosal disease. Correlate clinically. RECOMMENDATIONS:   Unavailable         XR CHEST PORTABLE   Final Result   Endotracheal and OG tubes in satisfactory position. No acute cardiopulmonary abnormality evident. XR ABDOMEN FOR NG/OG/NE TUBE PLACEMENT   Final Result   OG tube in satisfactory position. CTA HEAD NECK W CONTRAST   Final Result   Addendum 1 of 1   ADDENDUM:   Additional coronal reformatted images are provided which demonstrate   comminuted fracture of right occipital condyle. The findings were sent to the Radiology Results [address] at    2:09   pm on 4/12/2022to be communicated to a licensed caregiver. Final   CT of the head: There are scattered areas of subarachnoid hemorrhage   including bilateral superior parietal sulci, right parafalcine region and   possibly right mesial temporal area and left temporal region. .      There are questionable areas of loss of gray-white matter differentiation   predominantly in the temporal region. Findings may partly be related to   technique part/decreased exposure of the examination or may be related to   hypoxic injury. .      CTA of the head and neck: No hemodynamically significant lesion within the   head and neck circulation. No dissection. No intimal injury. No aneurysm. CT of the cervical spine: No acute osseous abnormality. No fracture.       CT of thoracic spine: Subtle cortical irregularity of superior endplate of T8   and inferior endplate of T9 with no significant height loss. Findings are   suggestive of age indeterminate compression fractures. For further   evaluation thoracic spine MRI can be obtained. CT of lumbar spine: No acute osseous abnormality. No fracture. CT of the chest abdomen and pelvis: There are subtle scattered areas of ground-glass density and consolidative   change within bilateral upper lobe. There is also linear consolidative change   posterior aspect of the right lower lobe, containing small locules of air. Findings are highly suggestive of pulmonary contusion with locules of air   likely representing a small pneumatocele formations. .      No acute traumatic injury within the abdomen and pelvis. RECOMMENDATIONS:   Unavailable         CT THORACIC SPINE TRAUMA RECONSTRUCTION   Final Result   Addendum 1 of 1   ADDENDUM:   Additional coronal reformatted images are provided which demonstrate   comminuted fracture of right occipital condyle. The findings were sent to the Radiology Results [address] at    2:09   pm on 4/12/2022to be communicated to a licensed caregiver. Final   CT of the head: There are scattered areas of subarachnoid hemorrhage   including bilateral superior parietal sulci, right parafalcine region and   possibly right mesial temporal area and left temporal region. .      There are questionable areas of loss of gray-white matter differentiation   predominantly in the temporal region. Findings may partly be related to   technique part/decreased exposure of the examination or may be related to   hypoxic injury. .      CTA of the head and neck: No hemodynamically significant lesion within the   head and neck circulation. No dissection. No intimal injury. No aneurysm. CT of the cervical spine: No acute osseous abnormality. No fracture. CT of thoracic spine: Subtle cortical irregularity of superior endplate of T8   and inferior endplate of T9 with no significant height loss. Findings are   suggestive of age indeterminate compression fractures. For further   evaluation thoracic spine MRI can be obtained. CT of lumbar spine: No acute osseous abnormality. No fracture. CT of the chest abdomen and pelvis: There are subtle scattered areas of ground-glass density and consolidative   change within bilateral upper lobe. There is also linear consolidative change   posterior aspect of the right lower lobe, containing small locules of air. Findings are highly suggestive of pulmonary contusion with locules of air   likely representing a small pneumatocele formations. .      No acute traumatic injury within the abdomen and pelvis. RECOMMENDATIONS:   Unavailable         CT LUMBAR SPINE TRAUMA RECONSTRUCTION   Final Result   Addendum 1 of 1   ADDENDUM:   Additional coronal reformatted images are provided which demonstrate   comminuted fracture of right occipital condyle. The findings were sent to the Radiology Results [address] at    2:09   pm on 4/12/2022to be communicated to a licensed caregiver. Final   CT of the head: There are scattered areas of subarachnoid hemorrhage   including bilateral superior parietal sulci, right parafalcine region and   possibly right mesial temporal area and left temporal region. .      There are questionable areas of loss of gray-white matter differentiation   predominantly in the temporal region. Findings may partly be related to   technique part/decreased exposure of the examination or may be related to   hypoxic injury. .      CTA of the head and neck: No hemodynamically significant lesion within the   head and neck circulation. No dissection. No intimal injury. No aneurysm. CT of the cervical spine: No acute osseous abnormality. No fracture. CT of thoracic spine: Subtle cortical irregularity of superior endplate of T8   and inferior endplate of T9 with no significant height loss.   Findings are   suggestive of age indeterminate compression fractures. For further   evaluation thoracic spine MRI can be obtained. CT of lumbar spine: No acute osseous abnormality. No fracture. CT of the chest abdomen and pelvis: There are subtle scattered areas of ground-glass density and consolidative   change within bilateral upper lobe. There is also linear consolidative change   posterior aspect of the right lower lobe, containing small locules of air. Findings are highly suggestive of pulmonary contusion with locules of air   likely representing a small pneumatocele formations. .      No acute traumatic injury within the abdomen and pelvis. RECOMMENDATIONS:   Unavailable         CT CHEST ABDOMEN PELVIS W CONTRAST   Final Result   Addendum 1 of 1   ADDENDUM:   Additional coronal reformatted images are provided which demonstrate   comminuted fracture of right occipital condyle. The findings were sent to the Radiology Results [address] at    2:09   pm on 4/12/2022to be communicated to a licensed caregiver. Final   CT of the head: There are scattered areas of subarachnoid hemorrhage   including bilateral superior parietal sulci, right parafalcine region and   possibly right mesial temporal area and left temporal region. .      There are questionable areas of loss of gray-white matter differentiation   predominantly in the temporal region. Findings may partly be related to   technique part/decreased exposure of the examination or may be related to   hypoxic injury. .      CTA of the head and neck: No hemodynamically significant lesion within the   head and neck circulation. No dissection. No intimal injury. No aneurysm. CT of the cervical spine: No acute osseous abnormality. No fracture. CT of thoracic spine: Subtle cortical irregularity of superior endplate of T8   and inferior endplate of T9 with no significant height loss.   Findings are   suggestive of age indeterminate compression fractures. For further   evaluation thoracic spine MRI can be obtained. CT of lumbar spine: No acute osseous abnormality. No fracture. CT of the chest abdomen and pelvis: There are subtle scattered areas of ground-glass density and consolidative   change within bilateral upper lobe. There is also linear consolidative change   posterior aspect of the right lower lobe, containing small locules of air. Findings are highly suggestive of pulmonary contusion with locules of air   likely representing a small pneumatocele formations. .      No acute traumatic injury within the abdomen and pelvis. RECOMMENDATIONS:   Unavailable         CT HEAD WO CONTRAST   Final Result   Addendum 1 of 1   ADDENDUM:   Additional coronal reformatted images are provided which demonstrate   comminuted fracture of right occipital condyle. The findings were sent to the Radiology Results [address] at    2:09   pm on 4/12/2022to be communicated to a licensed caregiver. Final   CT of the head: There are scattered areas of subarachnoid hemorrhage   including bilateral superior parietal sulci, right parafalcine region and   possibly right mesial temporal area and left temporal region. .      There are questionable areas of loss of gray-white matter differentiation   predominantly in the temporal region. Findings may partly be related to   technique part/decreased exposure of the examination or may be related to   hypoxic injury. .      CTA of the head and neck: No hemodynamically significant lesion within the   head and neck circulation. No dissection. No intimal injury. No aneurysm. CT of the cervical spine: No acute osseous abnormality. No fracture. CT of thoracic spine: Subtle cortical irregularity of superior endplate of T8   and inferior endplate of T9 with no significant height loss. Findings are   suggestive of age indeterminate compression fractures.   For further evaluation thoracic spine MRI can be obtained. CT of lumbar spine: No acute osseous abnormality. No fracture. CT of the chest abdomen and pelvis: There are subtle scattered areas of ground-glass density and consolidative   change within bilateral upper lobe. There is also linear consolidative change   posterior aspect of the right lower lobe, containing small locules of air. Findings are highly suggestive of pulmonary contusion with locules of air   likely representing a small pneumatocele formations. .      No acute traumatic injury within the abdomen and pelvis. RECOMMENDATIONS:   Unavailable         CT CERVICAL SPINE WO CONTRAST   Final Result   Addendum 1 of 1   ADDENDUM:   Additional coronal reformatted images are provided which demonstrate   comminuted fracture of right occipital condyle. The findings were sent to the Radiology Results [address] at    2:09   pm on 4/12/2022to be communicated to a licensed caregiver. Final   CT of the head: There are scattered areas of subarachnoid hemorrhage   including bilateral superior parietal sulci, right parafalcine region and   possibly right mesial temporal area and left temporal region. .      There are questionable areas of loss of gray-white matter differentiation   predominantly in the temporal region. Findings may partly be related to   technique part/decreased exposure of the examination or may be related to   hypoxic injury. .      CTA of the head and neck: No hemodynamically significant lesion within the   head and neck circulation. No dissection. No intimal injury. No aneurysm. CT of the cervical spine: No acute osseous abnormality. No fracture. CT of thoracic spine: Subtle cortical irregularity of superior endplate of T8   and inferior endplate of T9 with no significant height loss. Findings are   suggestive of age indeterminate compression fractures.   For further   evaluation thoracic spine MRI can be obtained. CT of lumbar spine: No acute osseous abnormality. No fracture. CT of the chest abdomen and pelvis: There are subtle scattered areas of ground-glass density and consolidative   change within bilateral upper lobe. There is also linear consolidative change   posterior aspect of the right lower lobe, containing small locules of air. Findings are highly suggestive of pulmonary contusion with locules of air   likely representing a small pneumatocele formations. .      No acute traumatic injury within the abdomen and pelvis. RECOMMENDATIONS:   Unavailable         XR CHEST PORTABLE    (Results Pending)         Impression:    1. Traumatic brain injury, subarachnoid hemorrhage/axonal injury  2. Right occipital condyle fracture  3. Agitation  4. Possible pulmonary contusions  5. Acute respiratory failure s/p trach  6. Dysphagia s/p PEG tube placement  7. Anemia  8. Bipolar disorder  9. Alcohol use  10. Obesity    Recommendations:    1. Diagnosis:  Traumatic brain injury  2. Therapy: Has PT/OT needs, anticipate SLP needs  3. Medical Necessity: As above  4. Support:  Lives with girlfriend or mother  11. Rehab Recommendation:  Will follow progress in therapies as medical condition improves. Currently, he would likely need LTACH upon discharge, as he remains on the ventilator. However, if he is able to be weaned off of the vent, can re-evaluate for acute rehab. Due to agitation, he may require a more specialized brain injury rehab facility. 6. DVT Prophylaxis: Lovenox    It was my pleasure to evaluate Sherri [place] today. Please call with questions.     Barrett Haque MD

## 2022-04-19 NOTE — PROGRESS NOTES
Occupational [address]  Occupational Therapy Not Seen Note    DATE: 2022    NAME: Lucila Cruz  MRN: [id number]   : 1995      Patient not seen this date for Occupational Therapy due to: Not appropriate at this time per RN d/t weaning off of vent.     Next Scheduled Treatment: 22    Electronically signed by Burak Recinos on 2022

## 2022-04-19 NOTE — PLAN OF CARE
Problem: OXYGENATION/RESPIRATORY FUNCTION  Goal: Patient will maintain patent airway  4/19/2022 1841 by Flaquita Villa RCP  Outcome: Ongoing     Problem: OXYGENATION/RESPIRATORY FUNCTION  Goal: Patient will achieve/maintain normal respiratory rate/effort  Description: Respiratory rate and effort will be within normal limits for the patient  4/19/2022 1841 by Flaquita Villa RCP  Outcome: Ongoing     Problem: MECHANICAL VENTILATION  Goal: Patient will maintain patent airway  4/19/2022 1841 by Flaquita Villa RCP  Outcome: Ongoing     Problem: MECHANICAL VENTILATION  Goal: Oral health is maintained or improved  4/19/2022 1841 by Flaquita Villa RCP  Outcome: Ongoing     Problem: MECHANICAL VENTILATION  Goal: ET tube will be managed safely  4/19/2022 1841 by Flaquita Villa RCP  Outcome: Ongoing     Problem: MECHANICAL VENTILATION  Goal: Ability to express needs and understand communication  4/19/2022 1841 by Flaquita Villa RCP  Outcome: Ongoing     Problem: MECHANICAL VENTILATION  Goal: Mobility/activity is maintained at optimum level for patient  4/19/2022 1841 by Flaquita Villa RCP  Outcome: Ongoing

## 2022-04-19 NOTE — PROGRESS NOTES
Physical Therapy        Physical Therapy Cancel Note      DATE: 2022    NAME: Shaniqua Kirk  MRN: [id number]   : 1995      Patient not seen this date for Physical Therapy due to:    Patient is not appropriate for PT treatment at this time d/t Pt weanning       Electronically signed by Salvatore Habermann, PTA on 2022 at 2:59 PM
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
## 2022-04-20 ENCOUNTER — APPOINTMENT (OUTPATIENT)
Dept: GENERAL RADIOLOGY | Age: 27
DRG: 005 | End: 2022-04-20
Payer: MEDICAID

## 2022-04-20 LAB
ANION GAP SERPL CALCULATED.3IONS-SCNC: 11 MMOL/L (ref 9–17)
BUN BLDV-MCNC: 23 MG/DL (ref 6–20)
CALCIUM SERPL-MCNC: 9.2 MG/DL (ref 8.6–10.4)
CHLORIDE BLD-SCNC: 108 MMOL/L (ref 98–107)
CO2: 26 MMOL/L (ref 20–31)
CREAT SERPL-MCNC: 0.75 MG/DL (ref 0.7–1.2)
FIO2: 30
GFR AFRICAN AMERICAN: >60 ML/MIN
GFR NON-AFRICAN AMERICAN: >60 ML/MIN
GFR SERPL CREATININE-BSD FRML MDRD: ABNORMAL ML/MIN/{1.73_M2}
GLUCOSE BLD-MCNC: 124 MG/DL (ref 70–99)
GLUCOSE BLD-MCNC: 134 MG/DL (ref 74–100)
HCT VFR BLD CALC: 31.5 % (ref 40.7–50.3)
HEMOGLOBIN: 10.4 G/DL (ref 13–17)
MAGNESIUM: 2 MG/DL (ref 1.6–2.6)
MCH RBC QN AUTO: 32.1 PG (ref 25.2–33.5)
MCHC RBC AUTO-ENTMCNC: 33 G/DL (ref 28.4–34.8)
MCV RBC AUTO: 97.2 FL (ref 82.6–102.9)
MODE: NORMAL
NRBC AUTOMATED: 0 PER 100 WBC
O2 DEVICE/FLOW/%: NORMAL
PDW BLD-RTO: 12.4 % (ref 11.8–14.4)
PHOSPHORUS: 4.2 MG/DL (ref 2.5–4.5)
PLATELET # BLD: 264 K/UL (ref 138–453)
PMV BLD AUTO: 9.3 FL (ref 8.1–13.5)
POC HCO3: 27.2 MMOL/L (ref 21–28)
POC LACTIC ACID: 0.68 MMOL/L (ref 0.56–1.39)
POC O2 SATURATION: 97 % (ref 94–98)
POC PCO2: 42.4 MM HG (ref 35–48)
POC PH: 7.42 (ref 7.35–7.45)
POC PO2: 87.9 MM HG (ref 83–108)
POSITIVE BASE EXCESS, ART: 2 (ref 0–3)
POTASSIUM SERPL-SCNC: 4.4 MMOL/L (ref 3.7–5.3)
RBC # BLD: 3.24 M/UL (ref 4.21–5.77)
SAMPLE SITE: NORMAL
SODIUM BLD-SCNC: 145 MMOL/L (ref 135–144)
WBC # BLD: 7.9 K/UL (ref 3.5–11.3)

## 2022-04-20 PROCEDURE — 6370000000 HC RX 637 (ALT 250 FOR IP): Performed by: EMERGENCY MEDICINE

## 2022-04-20 PROCEDURE — 51798 US URINE CAPACITY MEASURE: CPT

## 2022-04-20 PROCEDURE — 82803 BLOOD GASES ANY COMBINATION: CPT

## 2022-04-20 PROCEDURE — 2700000000 HC OXYGEN THERAPY PER DAY

## 2022-04-20 PROCEDURE — 6370000000 HC RX 637 (ALT 250 FOR IP): Performed by: STUDENT IN AN ORGANIZED HEALTH CARE EDUCATION/TRAINING PROGRAM

## 2022-04-20 PROCEDURE — 84100 ASSAY OF PHOSPHORUS: CPT

## 2022-04-20 PROCEDURE — 80048 BASIC METABOLIC PNL TOTAL CA: CPT

## 2022-04-20 PROCEDURE — 2580000003 HC RX 258: Performed by: EMERGENCY MEDICINE

## 2022-04-20 PROCEDURE — 97110 THERAPEUTIC EXERCISES: CPT

## 2022-04-20 PROCEDURE — 83605 ASSAY OF LACTIC ACID: CPT

## 2022-04-20 PROCEDURE — 94761 N-INVAS EAR/PLS OXIMETRY MLT: CPT

## 2022-04-20 PROCEDURE — 85027 COMPLETE CBC AUTOMATED: CPT

## 2022-04-20 PROCEDURE — 71045 X-RAY EXAM CHEST 1 VIEW: CPT

## 2022-04-20 PROCEDURE — 6360000002 HC RX W HCPCS: Performed by: EMERGENCY MEDICINE

## 2022-04-20 PROCEDURE — 36600 WITHDRAWAL OF ARTERIAL BLOOD: CPT

## 2022-04-20 PROCEDURE — 82947 ASSAY GLUCOSE BLOOD QUANT: CPT

## 2022-04-20 PROCEDURE — 83735 ASSAY OF MAGNESIUM: CPT

## 2022-04-20 PROCEDURE — 51701 INSERT BLADDER CATHETER: CPT

## 2022-04-20 PROCEDURE — 2000000000 HC ICU R&B

## 2022-04-20 PROCEDURE — 94003 VENT MGMT INPAT SUBQ DAY: CPT

## 2022-04-20 RX ORDER — QUETIAPINE FUMARATE 200 MG/1
200 TABLET, FILM COATED ORAL 2 TIMES DAILY
Status: DISCONTINUED | OUTPATIENT
Start: 2022-04-20 | End: 2022-04-21

## 2022-04-20 RX ORDER — QUETIAPINE FUMARATE 25 MG/1
50 TABLET, FILM COATED ORAL ONCE
Status: COMPLETED | OUTPATIENT
Start: 2022-04-20 | End: 2022-04-20

## 2022-04-20 RX ADMIN — FENTANYL CITRATE 100 MCG: 50 INJECTION, SOLUTION INTRAMUSCULAR; INTRAVENOUS at 11:24

## 2022-04-20 RX ADMIN — GABAPENTIN 600 MG: 600 TABLET ORAL at 20:43

## 2022-04-20 RX ADMIN — OXYCODONE HYDROCHLORIDE 5 MG: 5 TABLET ORAL at 20:41

## 2022-04-20 RX ADMIN — FAMOTIDINE 20 MG: 20 TABLET, FILM COATED ORAL at 08:13

## 2022-04-20 RX ADMIN — DIAZEPAM 5 MG: 5 TABLET ORAL at 04:15

## 2022-04-20 RX ADMIN — IBUPROFEN 400 MG: 400 TABLET, FILM COATED ORAL at 20:43

## 2022-04-20 RX ADMIN — IBUPROFEN 400 MG: 400 TABLET, FILM COATED ORAL at 06:02

## 2022-04-20 RX ADMIN — BACITRACIN: 500 OINTMENT TOPICAL at 20:41

## 2022-04-20 RX ADMIN — OXYCODONE HYDROCHLORIDE 5 MG: 5 TABLET ORAL at 02:01

## 2022-04-20 RX ADMIN — FENTANYL CITRATE 100 MCG: 50 INJECTION, SOLUTION INTRAMUSCULAR; INTRAVENOUS at 05:35

## 2022-04-20 RX ADMIN — SODIUM CHLORIDE 1000 ML: 9 INJECTION, SOLUTION INTRAVENOUS at 16:35

## 2022-04-20 RX ADMIN — ACETAMINOPHEN 1000 MG: 500 TABLET ORAL at 06:04

## 2022-04-20 RX ADMIN — FENTANYL CITRATE 100 MCG: 50 INJECTION, SOLUTION INTRAMUSCULAR; INTRAVENOUS at 04:28

## 2022-04-20 RX ADMIN — DIAZEPAM 5 MG: 5 TABLET ORAL at 20:41

## 2022-04-20 RX ADMIN — FOLIC ACID 1 MG: 1 TABLET ORAL at 08:13

## 2022-04-20 RX ADMIN — ACETAMINOPHEN 1000 MG: 500 TABLET ORAL at 15:00

## 2022-04-20 RX ADMIN — FENTANYL CITRATE 100 MCG: 50 INJECTION, SOLUTION INTRAMUSCULAR; INTRAVENOUS at 12:51

## 2022-04-20 RX ADMIN — FENTANYL CITRATE 100 MCG: 50 INJECTION, SOLUTION INTRAMUSCULAR; INTRAVENOUS at 21:30

## 2022-04-20 RX ADMIN — CLONIDINE HYDROCHLORIDE 0.1 MG: 0.1 TABLET ORAL at 20:45

## 2022-04-20 RX ADMIN — ENOXAPARIN SODIUM 30 MG: 100 INJECTION SUBCUTANEOUS at 08:32

## 2022-04-20 RX ADMIN — SODIUM CHLORIDE, PRESERVATIVE FREE 10 ML: 5 INJECTION INTRAVENOUS at 20:45

## 2022-04-20 RX ADMIN — GABAPENTIN 600 MG: 600 TABLET ORAL at 04:15

## 2022-04-20 RX ADMIN — BACITRACIN: 500 OINTMENT TOPICAL at 08:13

## 2022-04-20 RX ADMIN — SODIUM CHLORIDE 70 ML/HR: 9 INJECTION, SOLUTION INTRAVENOUS at 00:22

## 2022-04-20 RX ADMIN — SODIUM CHLORIDE, PRESERVATIVE FREE 10 ML: 5 INJECTION INTRAVENOUS at 10:38

## 2022-04-20 RX ADMIN — GABAPENTIN 600 MG: 600 TABLET ORAL at 14:28

## 2022-04-20 RX ADMIN — IBUPROFEN 400 MG: 400 TABLET, FILM COATED ORAL at 02:01

## 2022-04-20 RX ADMIN — DIAZEPAM 5 MG: 5 TABLET ORAL at 10:38

## 2022-04-20 RX ADMIN — IBUPROFEN 400 MG: 400 TABLET, FILM COATED ORAL at 10:38

## 2022-04-20 RX ADMIN — FENTANYL CITRATE 100 MCG: 50 INJECTION, SOLUTION INTRAMUSCULAR; INTRAVENOUS at 20:22

## 2022-04-20 RX ADMIN — QUETIAPINE FUMARATE 200 MG: 200 TABLET ORAL at 20:44

## 2022-04-20 RX ADMIN — CLONIDINE HYDROCHLORIDE 0.1 MG: 0.1 TABLET ORAL at 08:13

## 2022-04-20 RX ADMIN — QUETIAPINE FUMARATE 150 MG: 100 TABLET ORAL at 08:13

## 2022-04-20 RX ADMIN — QUETIAPINE FUMARATE 50 MG: 25 TABLET ORAL at 12:30

## 2022-04-20 RX ADMIN — CLONIDINE HYDROCHLORIDE 0.1 MG: 0.1 TABLET ORAL at 15:00

## 2022-04-20 RX ADMIN — DIAZEPAM 5 MG: 5 TABLET ORAL at 17:39

## 2022-04-20 RX ADMIN — ERYTHROMYCIN: 5 OINTMENT OPHTHALMIC at 20:45

## 2022-04-20 RX ADMIN — FAMOTIDINE 20 MG: 20 TABLET, FILM COATED ORAL at 20:45

## 2022-04-20 RX ADMIN — ENOXAPARIN SODIUM 30 MG: 100 INJECTION SUBCUTANEOUS at 20:45

## 2022-04-20 RX ADMIN — BACITRACIN: 500 OINTMENT TOPICAL at 17:39

## 2022-04-20 RX ADMIN — OXYCODONE HYDROCHLORIDE 5 MG: 5 TABLET ORAL at 14:28

## 2022-04-20 RX ADMIN — ACETAMINOPHEN 1000 MG: 500 TABLET ORAL at 20:41

## 2022-04-20 RX ADMIN — IBUPROFEN 400 MG: 400 TABLET, FILM COATED ORAL at 14:28

## 2022-04-20 ASSESSMENT — PULMONARY FUNCTION TESTS
PIF_VALUE: 14
PIF_VALUE: 12
PIF_VALUE: 17
PIF_VALUE: 17
PIF_VALUE: 16
PIF_VALUE: 14
PIF_VALUE: 20
PIF_VALUE: 14
PIF_VALUE: 17
PIF_VALUE: 19

## 2022-04-20 NOTE — PLAN OF CARE
Problem: OXYGENATION/RESPIRATORY FUNCTION  Goal: Patient will maintain patent airway  Outcome: Progressing  Goal: Patient will achieve/maintain normal respiratory rate/effort  Description: Respiratory rate and effort will be within normal limits for the patient  Outcome: Progressing     Problem: MECHANICAL VENTILATION  Goal: Patient will maintain patent airway  Outcome: Progressing  Goal: Oral health is maintained or improved  Outcome: Progressing  Goal: ET tube will be managed safely  Outcome: Progressing  Goal: Ability to express needs and understand communication  Outcome: Progressing  Goal: Mobility/activity is maintained at optimum level for patient  Outcome: Progressing

## 2022-04-20 NOTE — PROGRESS NOTES
04/20/22 1020   Oxygen Therapy/Pulse Ox   O2 Therapy Oxygen humidified   O2 Device Trach mask   O2 Flow Rate (L/min) 10 L/min   FiO2  60 %   Resp 22   SpO2 100 %
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
## 2022-04-20 NOTE — PLAN OF CARE
Problem: Non-Violent Restraints  Goal: No harm/injury to patient while restraints in use  Outcome: Met This Shift  Goal: Patient's dignity will be maintained  Outcome: Met This Shift     Problem: OXYGENATION/RESPIRATORY FUNCTION  Goal: Patient will maintain patent airway  4/19/2022 2000 by Alejandra Gurrola RN  Outcome: Ongoing  4/19/2022 1841 by Alla Martínez RCP  Outcome: Ongoing  Goal: Patient will achieve/maintain normal respiratory rate/effort  Description: Respiratory rate and effort will be within normal limits for the patient  4/19/2022 2000 by Alejandra Gurrola RN  Outcome: Ongoing  4/19/2022 1841 by Alla Martínez RCP  Outcome: Ongoing     Problem: SKIN INTEGRITY  Goal: Skin integrity is maintained or improved  Outcome: Ongoing     Problem: Non-Violent Restraints  Goal: Removal from restraints as soon as assessed to be safe  Outcome: Ongoing     Problem: Skin Integrity:  Goal: Will show no infection signs and symptoms  Description: Will show no infection signs and symptoms  4/19/2022 2000 by Alejandra Gurrola RN  Outcome: Ongoing  4/19/2022 1841 by Alla Martínez RCP  Outcome: Ongoing  Goal: Absence of new skin breakdown  Description: Absence of new skin breakdown  4/19/2022 2000 by Alejandra Gurrola RN  Outcome: Ongoing  4/19/2022 1841 by Alla Martínez RCP  Outcome: Ongoing     Problem: Falls - Risk of:  Goal: Will remain free from falls  Description: Will remain free from falls  Outcome: Ongoing  Goal: Absence of physical injury  Description: Absence of physical injury  Outcome: Ongoing     Problem: Nutrition  Goal: Optimal nutrition therapy  Outcome: Ongoing     Problem: MECHANICAL VENTILATION  Goal: Patient will maintain patent airway  4/19/2022 2000 by Alejandra Gurrola RN  Outcome: Ongoing  4/19/2022 1841 by Alla Martínez RCP  Outcome: Ongoing  Goal: Oral health is maintained or improved  4/19/2022 2000 by Alejandra Gurrola RN  Outcome: Ongoing  4/19/2022 1841 by Alla Martínez Telephone Encounter by Shante Bianchi RN at 03/13/17 03:10 PM     Author:  Shante Bianchi RN Service:  (none) Author Type:  Registered Nurse     Filed:  03/13/17 03:12 PM Encounter Date:  3/13/2017 Status:  Signed     :  Shante Bianchi RN (Registered Nurse)              [facility] calling  Reports co-pay with insurance coverage is too high for patient for the vancomycin    Patient had #16 tablets given to him last week and the copay was $148+  [facility] state that most recent script for #40 tablets, copay will be even higher     Please advise[JY1.1M]       Revision History        User Key Date/Time User Provider Type Action    > JY1.1 03/13/17 03:12 PM Shante Bianchi RN Registered Nurse Sign    M - Manual             KELSEAP  Outcome: Ongoing  Goal: ET tube will be managed safely  4/19/2022 2000 by Nika Dickinson RN  Outcome: Ongoing  4/19/2022 1841 by Harjeet Camacho RCP  Outcome: Ongoing  Goal: Ability to express needs and understand communication  4/19/2022 2000 by Nika Dickinson RN  Outcome: Ongoing  4/19/2022 1841 by Harjeet Camacho RCP  Outcome: Ongoing  Goal: Mobility/activity is maintained at optimum level for patient  4/19/2022 2000 by Nika Dickinson RN  Outcome: Ongoing  4/19/2022 1841 by Harjeet Camacho RCP  Outcome: Ongoing

## 2022-04-20 NOTE — CARE COORDINATION
SBIRT deferred as pt with trach/vent            Alcohol Screening and Brief Intervention          I have not interviewed Doni Bingham, [id number] regarding  His alcohol consumption/drug use and risk for excessive use.    Deferred [x] trach/vent    Completed on: 4/20/2022   GIOVANNI Guillen

## 2022-04-20 NOTE — CARE COORDINATION
Transitional planning. Called Devonte at [facility] to see if he received referral but he did not. Aware pt has [place] medicaid and will need select facility in [place]. Told him I will resend after obtaining pt's SS#. Called registration and they do not have SS#  Called pt's Mom, [address] and line busy. Tried with a \"1\" also and same. 1000 Called pt's Mom Chikis again and line remains busy. Called pt's father , Susan Richardson and left a VM. [address] [facility] at [facility] and aware CM still waiting for SS#. Michelle May said to send referral anyway and CM efaxed referral.    1500 Spoke with trauma coordinator Antonietta and both parents are POA and need to agree with [facility] choice. [facility] with Mom and obtained SS#. Mom aware placement needs to be in [place]. She will research ARU/brain rehabs and get with us but is also open to [facility] select  SS# . Faxed to reg [phone].  Called Devonte at [facility] and given [employer]
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
## 2022-04-21 ENCOUNTER — APPOINTMENT (OUTPATIENT)
Dept: GENERAL RADIOLOGY | Age: 27
DRG: 005 | End: 2022-04-21
Payer: MEDICAID

## 2022-04-21 PROBLEM — S02.113A UNSP OCCIPITAL CONDYLE FRACTURE, INIT FOR CLOS FX (HCC): Status: ACTIVE | Noted: 2022-04-21

## 2022-04-21 PROBLEM — J96.00 ACUTE RESPIRATORY FAILURE (HCC): Status: ACTIVE | Noted: 2022-04-21

## 2022-04-21 LAB
ANION GAP SERPL CALCULATED.3IONS-SCNC: 8 MMOL/L (ref 9–17)
BUN BLDV-MCNC: 22 MG/DL (ref 6–20)
CALCIUM SERPL-MCNC: 9.2 MG/DL (ref 8.6–10.4)
CHLORIDE BLD-SCNC: 102 MMOL/L (ref 98–107)
CO2: 26 MMOL/L (ref 20–31)
CREAT SERPL-MCNC: 0.62 MG/DL (ref 0.7–1.2)
GFR AFRICAN AMERICAN: >60 ML/MIN
GFR NON-AFRICAN AMERICAN: >60 ML/MIN
GFR SERPL CREATININE-BSD FRML MDRD: ABNORMAL ML/MIN/{1.73_M2}
GLUCOSE BLD-MCNC: 136 MG/DL (ref 70–99)
HCT VFR BLD CALC: 31 % (ref 40.7–50.3)
HEMOGLOBIN: 10.5 G/DL (ref 13–17)
MAGNESIUM: 1.9 MG/DL (ref 1.6–2.6)
MCH RBC QN AUTO: 32.5 PG (ref 25.2–33.5)
MCHC RBC AUTO-ENTMCNC: 33.9 G/DL (ref 28.4–34.8)
MCV RBC AUTO: 96 FL (ref 82.6–102.9)
NRBC AUTOMATED: 0 PER 100 WBC
PDW BLD-RTO: 12.2 % (ref 11.8–14.4)
PHOSPHORUS: 4.3 MG/DL (ref 2.5–4.5)
PLATELET # BLD: 276 K/UL (ref 138–453)
PMV BLD AUTO: 9.1 FL (ref 8.1–13.5)
POTASSIUM SERPL-SCNC: 4 MMOL/L (ref 3.7–5.3)
RBC # BLD: 3.23 M/UL (ref 4.21–5.77)
SODIUM BLD-SCNC: 136 MMOL/L (ref 135–144)
WBC # BLD: 7.9 K/UL (ref 3.5–11.3)

## 2022-04-21 PROCEDURE — 6370000000 HC RX 637 (ALT 250 FOR IP): Performed by: STUDENT IN AN ORGANIZED HEALTH CARE EDUCATION/TRAINING PROGRAM

## 2022-04-21 PROCEDURE — 6370000000 HC RX 637 (ALT 250 FOR IP): Performed by: EMERGENCY MEDICINE

## 2022-04-21 PROCEDURE — 6360000002 HC RX W HCPCS: Performed by: EMERGENCY MEDICINE

## 2022-04-21 PROCEDURE — 2000000000 HC ICU R&B

## 2022-04-21 PROCEDURE — 80048 BASIC METABOLIC PNL TOTAL CA: CPT

## 2022-04-21 PROCEDURE — 83735 ASSAY OF MAGNESIUM: CPT

## 2022-04-21 PROCEDURE — 84100 ASSAY OF PHOSPHORUS: CPT

## 2022-04-21 PROCEDURE — 2580000003 HC RX 258: Performed by: EMERGENCY MEDICINE

## 2022-04-21 PROCEDURE — 36415 COLL VENOUS BLD VENIPUNCTURE: CPT

## 2022-04-21 PROCEDURE — 85027 COMPLETE CBC AUTOMATED: CPT

## 2022-04-21 PROCEDURE — 97110 THERAPEUTIC EXERCISES: CPT

## 2022-04-21 PROCEDURE — 71045 X-RAY EXAM CHEST 1 VIEW: CPT

## 2022-04-21 PROCEDURE — 94003 VENT MGMT INPAT SUBQ DAY: CPT

## 2022-04-21 PROCEDURE — 97530 THERAPEUTIC ACTIVITIES: CPT

## 2022-04-21 PROCEDURE — 2700000000 HC OXYGEN THERAPY PER DAY

## 2022-04-21 PROCEDURE — 94761 N-INVAS EAR/PLS OXIMETRY MLT: CPT

## 2022-04-21 RX ADMIN — SODIUM CHLORIDE, PRESERVATIVE FREE 10 ML: 5 INJECTION INTRAVENOUS at 11:12

## 2022-04-21 RX ADMIN — FENTANYL CITRATE 100 MCG: 50 INJECTION, SOLUTION INTRAMUSCULAR; INTRAVENOUS at 09:26

## 2022-04-21 RX ADMIN — FAMOTIDINE 20 MG: 20 TABLET, FILM COATED ORAL at 10:03

## 2022-04-21 RX ADMIN — FENTANYL CITRATE 100 MCG: 50 INJECTION, SOLUTION INTRAMUSCULAR; INTRAVENOUS at 07:57

## 2022-04-21 RX ADMIN — QUETIAPINE FUMARATE 200 MG: 200 TABLET ORAL at 10:03

## 2022-04-21 RX ADMIN — OXYCODONE HYDROCHLORIDE 5 MG: 5 TABLET ORAL at 07:50

## 2022-04-21 RX ADMIN — IBUPROFEN 400 MG: 400 TABLET, FILM COATED ORAL at 10:03

## 2022-04-21 RX ADMIN — BACITRACIN: 500 OINTMENT TOPICAL at 20:32

## 2022-04-21 RX ADMIN — OXYCODONE HYDROCHLORIDE 5 MG: 5 TABLET ORAL at 00:49

## 2022-04-21 RX ADMIN — OXYCODONE HYDROCHLORIDE 5 MG: 5 TABLET ORAL at 14:37

## 2022-04-21 RX ADMIN — ACETAMINOPHEN 1000 MG: 500 TABLET ORAL at 14:37

## 2022-04-21 RX ADMIN — GABAPENTIN 600 MG: 600 TABLET ORAL at 15:44

## 2022-04-21 RX ADMIN — IBUPROFEN 400 MG: 400 TABLET, FILM COATED ORAL at 05:48

## 2022-04-21 RX ADMIN — SODIUM CHLORIDE 1000 ML: 9 INJECTION, SOLUTION INTRAVENOUS at 06:15

## 2022-04-21 RX ADMIN — DOCUSATE SODIUM 50 MG AND SENNOSIDES 8.6 MG 1 TABLET: 8.6; 5 TABLET, FILM COATED ORAL at 22:59

## 2022-04-21 RX ADMIN — BACITRACIN: 500 OINTMENT TOPICAL at 10:04

## 2022-04-21 RX ADMIN — QUETIAPINE FUMARATE 150 MG: 100 TABLET ORAL at 18:19

## 2022-04-21 RX ADMIN — ERYTHROMYCIN: 5 OINTMENT OPHTHALMIC at 20:33

## 2022-04-21 RX ADMIN — FENTANYL CITRATE 100 MCG: 50 INJECTION, SOLUTION INTRAMUSCULAR; INTRAVENOUS at 03:59

## 2022-04-21 RX ADMIN — SODIUM CHLORIDE 1000 ML: 9 INJECTION, SOLUTION INTRAVENOUS at 18:31

## 2022-04-21 RX ADMIN — FENTANYL CITRATE 100 MCG: 50 INJECTION, SOLUTION INTRAMUSCULAR; INTRAVENOUS at 06:19

## 2022-04-21 RX ADMIN — IBUPROFEN 400 MG: 400 TABLET, FILM COATED ORAL at 22:55

## 2022-04-21 RX ADMIN — IBUPROFEN 400 MG: 400 TABLET, FILM COATED ORAL at 18:19

## 2022-04-21 RX ADMIN — DIAZEPAM 5 MG: 5 TABLET ORAL at 23:00

## 2022-04-21 RX ADMIN — CLONIDINE HYDROCHLORIDE 0.1 MG: 0.1 TABLET ORAL at 10:03

## 2022-04-21 RX ADMIN — IBUPROFEN 400 MG: 400 TABLET, FILM COATED ORAL at 00:49

## 2022-04-21 RX ADMIN — OXYCODONE HYDROCHLORIDE 5 MG: 5 TABLET ORAL at 19:45

## 2022-04-21 RX ADMIN — FENTANYL CITRATE 100 MCG: 50 INJECTION, SOLUTION INTRAMUSCULAR; INTRAVENOUS at 12:01

## 2022-04-21 RX ADMIN — CLONIDINE HYDROCHLORIDE 0.1 MG: 0.1 TABLET ORAL at 15:44

## 2022-04-21 RX ADMIN — DIAZEPAM 5 MG: 5 TABLET ORAL at 04:01

## 2022-04-21 RX ADMIN — FAMOTIDINE 20 MG: 20 TABLET, FILM COATED ORAL at 22:59

## 2022-04-21 RX ADMIN — BACITRACIN: 500 OINTMENT TOPICAL at 15:44

## 2022-04-21 RX ADMIN — CLONIDINE HYDROCHLORIDE 0.1 MG: 0.1 TABLET ORAL at 20:33

## 2022-04-21 RX ADMIN — DIAZEPAM 5 MG: 5 TABLET ORAL at 16:01

## 2022-04-21 RX ADMIN — GABAPENTIN 600 MG: 600 TABLET ORAL at 20:33

## 2022-04-21 RX ADMIN — ACETAMINOPHEN 1000 MG: 500 TABLET ORAL at 23:00

## 2022-04-21 RX ADMIN — FOLIC ACID 1 MG: 1 TABLET ORAL at 10:03

## 2022-04-21 RX ADMIN — GABAPENTIN 600 MG: 600 TABLET ORAL at 05:01

## 2022-04-21 RX ADMIN — ENOXAPARIN SODIUM 30 MG: 100 INJECTION SUBCUTANEOUS at 10:04

## 2022-04-21 RX ADMIN — DIAZEPAM 5 MG: 5 TABLET ORAL at 11:09

## 2022-04-21 RX ADMIN — ENOXAPARIN SODIUM 30 MG: 100 INJECTION SUBCUTANEOUS at 20:33

## 2022-04-21 ASSESSMENT — PULMONARY FUNCTION TESTS
PIF_VALUE: 19
PIF_VALUE: 14
PIF_VALUE: 18
PIF_VALUE: 18
PIF_VALUE: 14
PIF_VALUE: 18
PIF_VALUE: 18
PIF_VALUE: 19
PIF_VALUE: 14
PIF_VALUE: 14
PIF_VALUE: 18
PIF_VALUE: 14
PIF_VALUE: 19
PIF_VALUE: 18
PIF_VALUE: 14
PIF_VALUE: 18
PIF_VALUE: 14
PIF_VALUE: 14
PIF_VALUE: 19
PIF_VALUE: 19
PIF_VALUE: 13
PIF_VALUE: 19
PIF_VALUE: 18
PIF_VALUE: 14
PIF_VALUE: 18
PIF_VALUE: 18
PIF_VALUE: 19

## 2022-04-21 NOTE — PROGRESS NOTES
Comprehensive Nutrition Assessment    Type and Reason for Visit:  Reassess    Nutrition Recommendations/Plan:   Continue TF as tolerated. Suggest increase rate, as able, to 75 mL/hr to provide 2700 kcal and 169 g pro/day. Will continue to monitor TF tolerance/adequacy and care plans. Malnutrition Assessment:  Malnutrition Status: At risk for malnutrition (Comment) (04/18/22 1210)    Context:  Acute Illness     Findings of the 6 clinical characteristics of malnutrition:  Energy Intake:  No significant decrease in energy intake (with use of TF)  Weight Loss:  No significant weight loss     Body Fat Loss:  No significant body fat loss     Muscle Mass Loss:  No significant muscle mass loss    Fluid Accumulation:  1 - Mild Extremities,Generalized   Strength:  Not Performed    Nutrition Assessment:    Chart reviewed. Pt remains NPO and on tube feeding for nutrition. Immune Enhancing TF at goal of 55 mL/hr. Pt having mutiple loose BMs per RN; bowel meds held. Meds/labs reviewed. Nutrition Related Findings:    Meds/labs reviewed. Multiple loose BMs. Wound Type: None       Current Nutrition Intake & Therapies:    Average Meal Intake: NPO  Average Supplements Intake: NPO  Diet NPO  ADULT TUBE FEEDING; PEG; Immune Enhancing; Continuous; 60; No; 30; Q 4 hours  Current Tube Feeding (TF) Orders:  · Feeding Route: PEG  · Formula: Immune Enhancing  · Schedule: Continuous  · Feeding Regimen: 55 mL/hr  · Additives/Modulars:    · Water Flushes: 30 mL every 4 hrs  · Current TF & Flush Orders Provides: Pivot 1.5 at 55 mL/hr =1980 kcal, 124 g pro, 990 mL water per day. Additional 180 mL free water per day if 30 mL given every 4 hrs.   · Goal TF & Flush Orders Provides: Pivot 1.5 at 75 mL/hr would provide 2700 kcal, 169 g pro, 1350 mL free water/day    Additional Calorie Sources:  none      Anthropometric Measures:  Height: 5' 11\" (180.3 cm)  Ideal Body Weight (IBW): 172 lbs (78 kg)    Admission Body Weight: 235 lb 3.7 oz Received request for more Dexilant refills from patient's [facility]' pharmacy.  Dexilant 60 mg one tablet by mouth every day. #30 refill 1. Must make an appointment for more refills.    (106.7 kg) (4/12, bedscale)  Current Body Weight: 235 lb 3.7 oz (106.7 kg), 136.8 % IBW. Current BMI (kg/m2): 32.8  Usual Body Weight:  (UTO)     Weight Adjustment For: No Adjustment                 BMI Categories: Obese Class 1 (BMI 30.0-34. 9)    Estimated Daily Nutrient Needs:  Energy Requirements Based On: Tara Loera Used for [employer] Requirements: Current  Energy (kcal/day): 7657-6665 kcal/day  Weight Used for Protein Requirements: Ideal (1.5-2)  Protein (g/day): 120-160 g pro/day  Method Used for Fluid Requirements: Other (Comment)  Fluid (ml/day): Per MD    Nutrition Diagnosis:   · Inadequate oral intake related to impaired respiratory function,acute injury/trauma as evidenced by NPO or clear liquid status due to medical condition,nutrition support - enteral nutrition    Nutrition Interventions:   Food and/or Nutrient Delivery: Continue TF as tolerated. Suggest increase rate, as able, to 75 mL/hr to provide 2700 kcal and 169 g pro/day. Nutrition Education/Counseling: No recommendation at this time  Coordination of Nutrition Care: Continue to monitor while inpatient       Goals:  Previous Goal Met: Progressing toward Goal(s)          Nutrition Monitoring and Evaluation:   Behavioral-Environmental Outcomes: None Identified  Food/Nutrient Intake Outcomes: Enteral Nutrition Intake/Tolerance  Physical Signs/Symptoms Outcomes: Biochemical Data,Nutrition Focused Physical Findings,Skin,Weight,Fluid Status or Edema,Diarrhea    Discharge Planning:     Too soon to determine     [address], LD  Contact: [phone]
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
## 2022-04-21 NOTE — CARE COORDINATION
Transition planning:  Spoke with Katie Coello with [facility], he informs that the referral has been shared with [facility], evaluating for [employer], but will not be able to admit with restraints as well as [place] is also a barrier. CM attempts to reach both pt's parents to inform and to obtain alternate placement choices. CM phoned [place] Rodney - not able to reach receive busy signal with multiple attempts. CM reached Shannon Zhou (parent)  left message non-identified pt information and requested c/b CM number provided. CM phoned other emergency contact listed Tina Clay reached  again provided CM c/b number, no pt identification left on message. Spoke with pt's nurse Hoda Castillo informed of above and CM was attempting to discuss option for possible admission to a Rehab center that specializes in brain injury. [facility], but pt would need to be off the vent they do accept trachs. Provided Hoda Castillo with pt information flier she will share with family if they arrive later today. This CM to report off to covering CM as well.

## 2022-04-22 ENCOUNTER — APPOINTMENT (OUTPATIENT)
Dept: GENERAL RADIOLOGY | Age: 27
DRG: 005 | End: 2022-04-22
Payer: MEDICAID

## 2022-04-22 LAB
ANION GAP SERPL CALCULATED.3IONS-SCNC: 11 MMOL/L (ref 9–17)
BUN BLDV-MCNC: 23 MG/DL (ref 6–20)
CALCIUM SERPL-MCNC: 9.7 MG/DL (ref 8.6–10.4)
CHLORIDE BLD-SCNC: 105 MMOL/L (ref 98–107)
CO2: 25 MMOL/L (ref 20–31)
CREAT SERPL-MCNC: 0.68 MG/DL (ref 0.7–1.2)
GFR AFRICAN AMERICAN: >60 ML/MIN
GFR NON-AFRICAN AMERICAN: >60 ML/MIN
GFR SERPL CREATININE-BSD FRML MDRD: ABNORMAL ML/MIN/{1.73_M2}
GLUCOSE BLD-MCNC: 115 MG/DL (ref 70–99)
HCT VFR BLD CALC: 34.6 % (ref 40.7–50.3)
HEMOGLOBIN: 11.6 G/DL (ref 13–17)
MAGNESIUM: 2 MG/DL (ref 1.6–2.6)
MCH RBC QN AUTO: 32.6 PG (ref 25.2–33.5)
MCHC RBC AUTO-ENTMCNC: 33.5 G/DL (ref 28.4–34.8)
MCV RBC AUTO: 97.2 FL (ref 82.6–102.9)
NRBC AUTOMATED: 0 PER 100 WBC
PDW BLD-RTO: 11.9 % (ref 11.8–14.4)
PHOSPHORUS: 4.3 MG/DL (ref 2.5–4.5)
PLATELET # BLD: 329 K/UL (ref 138–453)
PMV BLD AUTO: 9.2 FL (ref 8.1–13.5)
POTASSIUM SERPL-SCNC: 4.5 MMOL/L (ref 3.7–5.3)
RBC # BLD: 3.56 M/UL (ref 4.21–5.77)
SODIUM BLD-SCNC: 141 MMOL/L (ref 135–144)
WBC # BLD: 7.9 K/UL (ref 3.5–11.3)

## 2022-04-22 PROCEDURE — 83735 ASSAY OF MAGNESIUM: CPT

## 2022-04-22 PROCEDURE — 36415 COLL VENOUS BLD VENIPUNCTURE: CPT

## 2022-04-22 PROCEDURE — 6370000000 HC RX 637 (ALT 250 FOR IP): Performed by: EMERGENCY MEDICINE

## 2022-04-22 PROCEDURE — 2700000000 HC OXYGEN THERAPY PER DAY

## 2022-04-22 PROCEDURE — 94003 VENT MGMT INPAT SUBQ DAY: CPT

## 2022-04-22 PROCEDURE — 71045 X-RAY EXAM CHEST 1 VIEW: CPT

## 2022-04-22 PROCEDURE — 6360000002 HC RX W HCPCS: Performed by: EMERGENCY MEDICINE

## 2022-04-22 PROCEDURE — 2000000000 HC ICU R&B

## 2022-04-22 PROCEDURE — 94761 N-INVAS EAR/PLS OXIMETRY MLT: CPT

## 2022-04-22 PROCEDURE — 84100 ASSAY OF PHOSPHORUS: CPT

## 2022-04-22 PROCEDURE — 2580000003 HC RX 258: Performed by: EMERGENCY MEDICINE

## 2022-04-22 PROCEDURE — 85027 COMPLETE CBC AUTOMATED: CPT

## 2022-04-22 PROCEDURE — 6360000002 HC RX W HCPCS: Performed by: STUDENT IN AN ORGANIZED HEALTH CARE EDUCATION/TRAINING PROGRAM

## 2022-04-22 PROCEDURE — 6370000000 HC RX 637 (ALT 250 FOR IP): Performed by: STUDENT IN AN ORGANIZED HEALTH CARE EDUCATION/TRAINING PROGRAM

## 2022-04-22 PROCEDURE — 80048 BASIC METABOLIC PNL TOTAL CA: CPT

## 2022-04-22 RX ORDER — FENTANYL CITRATE 50 UG/ML
50 INJECTION, SOLUTION INTRAMUSCULAR; INTRAVENOUS
Status: DISCONTINUED | OUTPATIENT
Start: 2022-04-22 | End: 2022-05-02

## 2022-04-22 RX ORDER — QUETIAPINE FUMARATE 200 MG/1
200 TABLET, FILM COATED ORAL EVERY 8 HOURS
Status: DISCONTINUED | OUTPATIENT
Start: 2022-04-22 | End: 2022-04-25

## 2022-04-22 RX ADMIN — GABAPENTIN 600 MG: 600 TABLET ORAL at 12:59

## 2022-04-22 RX ADMIN — GABAPENTIN 600 MG: 600 TABLET ORAL at 21:16

## 2022-04-22 RX ADMIN — ACETAMINOPHEN 1000 MG: 500 TABLET ORAL at 21:16

## 2022-04-22 RX ADMIN — OXYCODONE HYDROCHLORIDE 5 MG: 5 TABLET ORAL at 12:58

## 2022-04-22 RX ADMIN — FENTANYL CITRATE 50 MCG: 50 INJECTION INTRAMUSCULAR; INTRAVENOUS at 17:42

## 2022-04-22 RX ADMIN — SODIUM CHLORIDE, PRESERVATIVE FREE 10 ML: 5 INJECTION INTRAVENOUS at 08:58

## 2022-04-22 RX ADMIN — FENTANYL CITRATE 100 MCG: 50 INJECTION, SOLUTION INTRAMUSCULAR; INTRAVENOUS at 11:39

## 2022-04-22 RX ADMIN — FENTANYL CITRATE 50 MCG: 50 INJECTION INTRAMUSCULAR; INTRAVENOUS at 13:51

## 2022-04-22 RX ADMIN — QUETIAPINE FUMARATE 150 MG: 100 TABLET ORAL at 02:11

## 2022-04-22 RX ADMIN — IBUPROFEN 400 MG: 400 TABLET, FILM COATED ORAL at 23:13

## 2022-04-22 RX ADMIN — ERYTHROMYCIN: 5 OINTMENT OPHTHALMIC at 21:16

## 2022-04-22 RX ADMIN — DIAZEPAM 5 MG: 5 TABLET ORAL at 21:16

## 2022-04-22 RX ADMIN — BACITRACIN: 500 OINTMENT TOPICAL at 13:55

## 2022-04-22 RX ADMIN — ENOXAPARIN SODIUM 30 MG: 100 INJECTION SUBCUTANEOUS at 20:26

## 2022-04-22 RX ADMIN — OXYCODONE HYDROCHLORIDE 5 MG: 5 TABLET ORAL at 20:25

## 2022-04-22 RX ADMIN — DIAZEPAM 5 MG: 5 TABLET ORAL at 16:14

## 2022-04-22 RX ADMIN — QUETIAPINE FUMARATE 150 MG: 100 TABLET ORAL at 08:57

## 2022-04-22 RX ADMIN — ACETAMINOPHEN 1000 MG: 500 TABLET ORAL at 05:35

## 2022-04-22 RX ADMIN — BACITRACIN: 500 OINTMENT TOPICAL at 09:00

## 2022-04-22 RX ADMIN — IBUPROFEN 400 MG: 400 TABLET, FILM COATED ORAL at 13:55

## 2022-04-22 RX ADMIN — ENOXAPARIN SODIUM 30 MG: 100 INJECTION SUBCUTANEOUS at 08:57

## 2022-04-22 RX ADMIN — FENTANYL CITRATE 100 MCG: 50 INJECTION, SOLUTION INTRAMUSCULAR; INTRAVENOUS at 09:18

## 2022-04-22 RX ADMIN — DIAZEPAM 5 MG: 5 TABLET ORAL at 03:31

## 2022-04-22 RX ADMIN — CLONIDINE HYDROCHLORIDE 0.1 MG: 0.1 TABLET ORAL at 08:56

## 2022-04-22 RX ADMIN — CLONIDINE HYDROCHLORIDE 0.1 MG: 0.1 TABLET ORAL at 21:16

## 2022-04-22 RX ADMIN — CLONIDINE HYDROCHLORIDE 0.1 MG: 0.1 TABLET ORAL at 13:55

## 2022-04-22 RX ADMIN — ACETAMINOPHEN 1000 MG: 500 TABLET ORAL at 12:58

## 2022-04-22 RX ADMIN — IBUPROFEN 400 MG: 400 TABLET, FILM COATED ORAL at 18:03

## 2022-04-22 RX ADMIN — FENTANYL CITRATE 50 MCG: 50 INJECTION INTRAMUSCULAR; INTRAVENOUS at 14:52

## 2022-04-22 RX ADMIN — IBUPROFEN 400 MG: 400 TABLET, FILM COATED ORAL at 09:01

## 2022-04-22 RX ADMIN — IBUPROFEN 400 MG: 400 TABLET, FILM COATED ORAL at 05:35

## 2022-04-22 RX ADMIN — DOCUSATE SODIUM 50 MG AND SENNOSIDES 8.6 MG 1 TABLET: 8.6; 5 TABLET, FILM COATED ORAL at 21:16

## 2022-04-22 RX ADMIN — FAMOTIDINE 20 MG: 20 TABLET, FILM COATED ORAL at 21:16

## 2022-04-22 RX ADMIN — FOLIC ACID 1 MG: 1 TABLET ORAL at 08:56

## 2022-04-22 RX ADMIN — IBUPROFEN 400 MG: 400 TABLET, FILM COATED ORAL at 02:11

## 2022-04-22 RX ADMIN — BACITRACIN: 500 OINTMENT TOPICAL at 20:25

## 2022-04-22 RX ADMIN — FENTANYL CITRATE 50 MCG: 50 INJECTION INTRAMUSCULAR; INTRAVENOUS at 16:04

## 2022-04-22 RX ADMIN — FENTANYL CITRATE 100 MCG: 50 INJECTION, SOLUTION INTRAMUSCULAR; INTRAVENOUS at 06:39

## 2022-04-22 RX ADMIN — FAMOTIDINE 20 MG: 20 TABLET, FILM COATED ORAL at 08:56

## 2022-04-22 RX ADMIN — GABAPENTIN 600 MG: 600 TABLET ORAL at 05:35

## 2022-04-22 RX ADMIN — DIAZEPAM 5 MG: 5 TABLET ORAL at 08:56

## 2022-04-22 RX ADMIN — FENTANYL CITRATE 100 MCG: 50 INJECTION, SOLUTION INTRAMUSCULAR; INTRAVENOUS at 08:08

## 2022-04-22 RX ADMIN — OXYCODONE HYDROCHLORIDE 5 MG: 5 TABLET ORAL at 06:30

## 2022-04-22 RX ADMIN — QUETIAPINE FUMARATE 200 MG: 200 TABLET ORAL at 18:03

## 2022-04-22 RX ADMIN — FENTANYL CITRATE 100 MCG: 50 INJECTION, SOLUTION INTRAMUSCULAR; INTRAVENOUS at 05:34

## 2022-04-22 RX ADMIN — OXYCODONE HYDROCHLORIDE 5 MG: 5 TABLET ORAL at 00:37

## 2022-04-22 ASSESSMENT — PULMONARY FUNCTION TESTS
PIF_VALUE: 18
PIF_VALUE: 19
PIF_VALUE: 17
PIF_VALUE: 19
PIF_VALUE: 22
PIF_VALUE: 15
PIF_VALUE: 18
PIF_VALUE: 19
PIF_VALUE: 15
PIF_VALUE: 18
PIF_VALUE: 18
PIF_VALUE: 17
PIF_VALUE: 19
PIF_VALUE: 18
PIF_VALUE: 19
PIF_VALUE: 15
PIF_VALUE: 18
PIF_VALUE: 19
PIF_VALUE: 18

## 2022-04-22 ASSESSMENT — PAIN SCALES - GENERAL
PAINLEVEL_OUTOF10: 0

## 2022-04-22 ASSESSMENT — PAIN - FUNCTIONAL ASSESSMENT
PAIN_FUNCTIONAL_ASSESSMENT: ACTIVITIES ARE NOT PREVENTED

## 2022-04-22 ASSESSMENT — PAIN SCALES - WONG BAKER
WONGBAKER_NUMERICALRESPONSE: 0

## 2022-04-22 NOTE — PROGRESS NOTES
Pt thrashing around pulling at tubes and trach and trying to bite off lydia, pt treated with PRN doses of Fentanyl. Pharmacy called 2x to send morning medications. Notified charge to request guard at bedside. Guard at bedside. VS stable will continue to monitor.

## 2022-04-22 NOTE — CARE COORDINATION
Placed call to [facility] at [facility] to f/u on [facility] referrals and per [facility], he will check with [facility] to see if they have a bed available. Writer questioned restraint use as per CM note 4/21/22, pt will not be able to admit with restraints and per [facility], typically, they can accommodate up to 2-point restraints, need to know what the restraints are for, pt currently has a soft mitt. 1218 - Received message from Elizabeth Bess, did not call back, per pt contact instructions, \"no staff is to call her for any reason per pt's mother. \"    [phone] - Met with pt's parents, LTACH list printed from Medicare. gov provided for additional choices. Attempted to provide LTACH list from pt's insurance website, no facilities were listed, explained to pt's parents. 1489 - Per  note 4/21/22, flyer for [address] was left with pt's nurse; however, pt's parents today stated that they did not receive the information. Writer printed list of acute rehabilitation facilities from [employer] and provided to pt's parents, need a choice. [address] for [address] located in pt's chart, writer provided to pt's parents and per pt's mother, she stated, \"I know about this one,\" and she also stated, \"I do not want a referral there. \"    2333 - Pt's mother up to the , provided LTACH choice of [facility], placed call to Andrea Conner, left message, call back requested.

## 2022-04-22 NOTE — PLAN OF CARE
Problem: Non-Violent Restraints  Goal: Removal from restraints as soon as assessed to be safe  Outcome: Progressing  Goal: No harm/injury to patient while restraints in use  Outcome: Progressing  Goal: Patient's dignity will be maintained  Outcome: Progressing     Problem: Skin Integrity:  Goal: Will show no infection signs and symptoms  Description: Will show no infection signs and symptoms  Outcome: Progressing  Goal: Absence of new skin breakdown  Description: Absence of new skin breakdown  Outcome: Progressing     Problem: Falls - Risk of:  Goal: Will remain free from falls  Description: Will remain free from falls  Outcome: Progressing  Goal: Absence of physical injury  Description: Absence of physical injury  Outcome: Progressing     Problem: Safety - Medical Restraint  Goal: Remains free of injury from restraints (Restraint for Interference with Medical Device)  Description: INTERVENTIONS:  1. Determine that other, less restrictive measures have been tried or would not be effective before applying the restraint  2. Evaluate the patient's condition at the time of restraint application  3. Inform patient/family regarding the reason for restraint  4.  Q2H: Monitor safety, psychosocial status, comfort, nutrition and hydration  Outcome: Progressing     Problem: Pain  Goal: Verbalizes/displays adequate comfort level or baseline comfort level  Outcome: Progressing

## 2022-04-22 NOTE — PROGRESS NOTES
Occupational [address]  Occupational Therapy Not Seen Note    DATE: 2022    NAME: Charmaine Severs  MRN: [id number]   : 1995      Patient not seen this date for Occupational Therapy due to:    Patient is not appropriate for active participation in OT treatment at this time d/t pt agitated, pulling at lines/trach, biting at mitts.     Next Scheduled Treatment:     Electronically signed by WOLFGANG Avila on 2022 at 11:06 AM
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
## 2022-04-22 NOTE — PLAN OF CARE
Problem: OXYGENATION/RESPIRATORY FUNCTION  Goal: Patient will maintain patent airway  Outcome: Progressing  Goal: Patient will achieve/maintain normal respiratory rate/effort  Description: Respiratory rate and effort will be within normal limits for the patient  Outcome: Progressing     Problem: SKIN INTEGRITY  Goal: Skin integrity is maintained or improved  Outcome: Progressing     Problem: MECHANICAL VENTILATION  Goal: Patient will maintain patent airway  Outcome: Progressing  Goal: Oral health is maintained or improved  Outcome: Progressing  Goal: ET tube will be managed safely  Outcome: Progressing  Goal: Ability to express needs and understand communication  Outcome: Progressing  Goal: Mobility/activity is maintained at optimum level for patient  Outcome: Progressing     Problem: Non-Violent Restraints  Goal: Removal from restraints as soon as assessed to be safe  Outcome: Not Progressing  Goal: No harm/injury to patient while restraints in use  Outcome: Not Progressing  Goal: Patient's dignity will be maintained  Outcome: Not Progressing     Problem: Skin Integrity:  Goal: Will show no infection signs and symptoms  Description: Will show no infection signs and symptoms  Outcome: Progressing  Goal: Absence of new skin breakdown  Description: Absence of new skin breakdown  Outcome: Progressing     Problem: Falls - Risk of:  Goal: Will remain free from falls  Description: Will remain free from falls  Outcome: Progressing  Goal: Absence of physical injury  Description: Absence of physical injury  Outcome: Progressing     Problem: Nutrition  Goal: Optimal nutrition therapy  Outcome: Progressing     Problem: Discharge Planning  Goal: Discharge to home or other facility with appropriate resources  Outcome: Progressing     Problem: Safety - Medical Restraint  Goal: Remains free of injury from restraints (Restraint for Interference with Medical Device)  Description: INTERVENTIONS:  1.  Determine that other, less restrictive measures have been tried or would not be effective before applying the restraint  2. Evaluate the patient's condition at the time of restraint application  3. Inform patient/family regarding the reason for restraint  4.  Q2H: Monitor safety, psychosocial status, comfort, nutrition and hydration  Outcome: Progressing     Problem: Pain  Goal: Verbalizes/displays adequate comfort level or baseline comfort level  Outcome: Progressing

## 2022-04-23 ENCOUNTER — APPOINTMENT (OUTPATIENT)
Dept: GENERAL RADIOLOGY | Age: 27
DRG: 005 | End: 2022-04-23
Payer: MEDICAID

## 2022-04-23 LAB
ALLEN TEST: POSITIVE
ANION GAP SERPL CALCULATED.3IONS-SCNC: 14 MMOL/L (ref 9–17)
BUN BLDV-MCNC: 28 MG/DL (ref 6–20)
CALCIUM SERPL-MCNC: 9.8 MG/DL (ref 8.6–10.4)
CHLORIDE BLD-SCNC: 107 MMOL/L (ref 98–107)
CO2: 23 MMOL/L (ref 20–31)
CREAT SERPL-MCNC: 0.68 MG/DL (ref 0.7–1.2)
EKG ATRIAL RATE: 120 BPM
EKG P AXIS: 39 DEGREES
EKG P-R INTERVAL: 150 MS
EKG Q-T INTERVAL: 334 MS
EKG QRS DURATION: 92 MS
EKG QTC CALCULATION (BAZETT): 472 MS
EKG R AXIS: -2 DEGREES
EKG T AXIS: 42 DEGREES
EKG VENTRICULAR RATE: 120 BPM
FIO2: 30
GFR AFRICAN AMERICAN: >60 ML/MIN
GFR NON-AFRICAN AMERICAN: >60 ML/MIN
GFR SERPL CREATININE-BSD FRML MDRD: ABNORMAL ML/MIN/{1.73_M2}
GLUCOSE BLD-MCNC: 102 MG/DL (ref 74–100)
GLUCOSE BLD-MCNC: 121 MG/DL (ref 70–99)
HCT VFR BLD CALC: 34.7 % (ref 40.7–50.3)
HEMOGLOBIN: 11.8 G/DL (ref 13–17)
MAGNESIUM: 2 MG/DL (ref 1.6–2.6)
MCH RBC QN AUTO: 32.6 PG (ref 25.2–33.5)
MCHC RBC AUTO-ENTMCNC: 34 G/DL (ref 28.4–34.8)
MCV RBC AUTO: 95.9 FL (ref 82.6–102.9)
MODE: NORMAL
NRBC AUTOMATED: 0 PER 100 WBC
O2 DEVICE/FLOW/%: NORMAL
PDW BLD-RTO: 11.9 % (ref 11.8–14.4)
PHOSPHORUS: 4.5 MG/DL (ref 2.5–4.5)
PLATELET # BLD: 385 K/UL (ref 138–453)
PMV BLD AUTO: 9.2 FL (ref 8.1–13.5)
POC HCO3: 27.6 MMOL/L (ref 21–28)
POC LACTIC ACID: 0.73 MMOL/L (ref 0.56–1.39)
POC O2 SATURATION: 98 % (ref 94–98)
POC PCO2: 40.8 MM HG (ref 35–48)
POC PH: 7.44 (ref 7.35–7.45)
POC PO2: 98.7 MM HG (ref 83–108)
POSITIVE BASE EXCESS, ART: 3 (ref 0–3)
POTASSIUM SERPL-SCNC: 4.3 MMOL/L (ref 3.7–5.3)
RBC # BLD: 3.62 M/UL (ref 4.21–5.77)
SAMPLE SITE: NORMAL
SODIUM BLD-SCNC: 144 MMOL/L (ref 135–144)
WBC # BLD: 11.5 K/UL (ref 3.5–11.3)

## 2022-04-23 PROCEDURE — 85027 COMPLETE CBC AUTOMATED: CPT

## 2022-04-23 PROCEDURE — 6370000000 HC RX 637 (ALT 250 FOR IP): Performed by: STUDENT IN AN ORGANIZED HEALTH CARE EDUCATION/TRAINING PROGRAM

## 2022-04-23 PROCEDURE — 94003 VENT MGMT INPAT SUBQ DAY: CPT

## 2022-04-23 PROCEDURE — 82803 BLOOD GASES ANY COMBINATION: CPT

## 2022-04-23 PROCEDURE — 93005 ELECTROCARDIOGRAM TRACING: CPT | Performed by: EMERGENCY MEDICINE

## 2022-04-23 PROCEDURE — 2700000000 HC OXYGEN THERAPY PER DAY

## 2022-04-23 PROCEDURE — 83735 ASSAY OF MAGNESIUM: CPT

## 2022-04-23 PROCEDURE — 2000000000 HC ICU R&B

## 2022-04-23 PROCEDURE — 93010 ELECTROCARDIOGRAM REPORT: CPT | Performed by: INTERNAL MEDICINE

## 2022-04-23 PROCEDURE — 94761 N-INVAS EAR/PLS OXIMETRY MLT: CPT

## 2022-04-23 PROCEDURE — 83605 ASSAY OF LACTIC ACID: CPT

## 2022-04-23 PROCEDURE — 82947 ASSAY GLUCOSE BLOOD QUANT: CPT

## 2022-04-23 PROCEDURE — 36415 COLL VENOUS BLD VENIPUNCTURE: CPT

## 2022-04-23 PROCEDURE — 6370000000 HC RX 637 (ALT 250 FOR IP): Performed by: EMERGENCY MEDICINE

## 2022-04-23 PROCEDURE — 2580000003 HC RX 258: Performed by: STUDENT IN AN ORGANIZED HEALTH CARE EDUCATION/TRAINING PROGRAM

## 2022-04-23 PROCEDURE — 84100 ASSAY OF PHOSPHORUS: CPT

## 2022-04-23 PROCEDURE — 36600 WITHDRAWAL OF ARTERIAL BLOOD: CPT

## 2022-04-23 PROCEDURE — 71045 X-RAY EXAM CHEST 1 VIEW: CPT

## 2022-04-23 PROCEDURE — 6360000002 HC RX W HCPCS: Performed by: STUDENT IN AN ORGANIZED HEALTH CARE EDUCATION/TRAINING PROGRAM

## 2022-04-23 PROCEDURE — 6360000002 HC RX W HCPCS: Performed by: EMERGENCY MEDICINE

## 2022-04-23 PROCEDURE — 80048 BASIC METABOLIC PNL TOTAL CA: CPT

## 2022-04-23 RX ORDER — ENOXAPARIN SODIUM 100 MG/ML
30 INJECTION SUBCUTANEOUS 2 TIMES DAILY
Status: DISCONTINUED | OUTPATIENT
Start: 2022-04-24 | End: 2022-05-12 | Stop reason: HOSPADM

## 2022-04-23 RX ORDER — HALOPERIDOL 5 MG/ML
5 INJECTION INTRAMUSCULAR ONCE
Status: COMPLETED | OUTPATIENT
Start: 2022-04-23 | End: 2022-04-23

## 2022-04-23 RX ADMIN — BACITRACIN: 500 OINTMENT TOPICAL at 20:35

## 2022-04-23 RX ADMIN — DIAZEPAM 5 MG: 5 TABLET ORAL at 16:53

## 2022-04-23 RX ADMIN — CLONIDINE HYDROCHLORIDE 0.1 MG: 0.1 TABLET ORAL at 20:35

## 2022-04-23 RX ADMIN — ACETAMINOPHEN 1000 MG: 500 TABLET ORAL at 21:57

## 2022-04-23 RX ADMIN — IBUPROFEN 400 MG: 400 TABLET, FILM COATED ORAL at 14:17

## 2022-04-23 RX ADMIN — DIAZEPAM 5 MG: 5 TABLET ORAL at 21:57

## 2022-04-23 RX ADMIN — FENTANYL CITRATE 50 MCG: 50 INJECTION INTRAMUSCULAR; INTRAVENOUS at 09:15

## 2022-04-23 RX ADMIN — FENTANYL CITRATE 50 MCG: 50 INJECTION INTRAMUSCULAR; INTRAVENOUS at 21:58

## 2022-04-23 RX ADMIN — IBUPROFEN 400 MG: 400 TABLET, FILM COATED ORAL at 02:08

## 2022-04-23 RX ADMIN — DIAZEPAM 5 MG: 5 TABLET ORAL at 08:42

## 2022-04-23 RX ADMIN — ENOXAPARIN SODIUM 30 MG: 100 INJECTION SUBCUTANEOUS at 20:35

## 2022-04-23 RX ADMIN — DOCUSATE SODIUM 50 MG AND SENNOSIDES 8.6 MG 1 TABLET: 8.6; 5 TABLET, FILM COATED ORAL at 20:34

## 2022-04-23 RX ADMIN — FENTANYL CITRATE 50 MCG: 50 INJECTION INTRAMUSCULAR; INTRAVENOUS at 06:33

## 2022-04-23 RX ADMIN — ERYTHROMYCIN: 5 OINTMENT OPHTHALMIC at 22:25

## 2022-04-23 RX ADMIN — DOCUSATE SODIUM 50 MG AND SENNOSIDES 8.6 MG 1 TABLET: 8.6; 5 TABLET, FILM COATED ORAL at 08:41

## 2022-04-23 RX ADMIN — GABAPENTIN 600 MG: 600 TABLET ORAL at 13:17

## 2022-04-23 RX ADMIN — OXYCODONE HYDROCHLORIDE 5 MG: 5 TABLET ORAL at 13:17

## 2022-04-23 RX ADMIN — SODIUM CHLORIDE 500 ML: 9 INJECTION, SOLUTION INTRAVENOUS at 17:53

## 2022-04-23 RX ADMIN — SODIUM CHLORIDE 500 ML: 9 INJECTION, SOLUTION INTRAVENOUS at 14:40

## 2022-04-23 RX ADMIN — IBUPROFEN 400 MG: 400 TABLET, FILM COATED ORAL at 21:58

## 2022-04-23 RX ADMIN — QUETIAPINE FUMARATE 200 MG: 200 TABLET ORAL at 02:08

## 2022-04-23 RX ADMIN — BACITRACIN: 500 OINTMENT TOPICAL at 08:55

## 2022-04-23 RX ADMIN — FOLIC ACID 1 MG: 1 TABLET ORAL at 08:54

## 2022-04-23 RX ADMIN — DIAZEPAM 5 MG: 5 TABLET ORAL at 03:44

## 2022-04-23 RX ADMIN — IBUPROFEN 400 MG: 400 TABLET, FILM COATED ORAL at 18:00

## 2022-04-23 RX ADMIN — BACITRACIN: 500 OINTMENT TOPICAL at 13:18

## 2022-04-23 RX ADMIN — QUETIAPINE FUMARATE 200 MG: 200 TABLET ORAL at 08:42

## 2022-04-23 RX ADMIN — FAMOTIDINE 20 MG: 20 TABLET, FILM COATED ORAL at 22:24

## 2022-04-23 RX ADMIN — OXYCODONE HYDROCHLORIDE 5 MG: 5 TABLET ORAL at 08:42

## 2022-04-23 RX ADMIN — GABAPENTIN 600 MG: 600 TABLET ORAL at 20:34

## 2022-04-23 RX ADMIN — FAMOTIDINE 20 MG: 20 TABLET, FILM COATED ORAL at 08:42

## 2022-04-23 RX ADMIN — ACETAMINOPHEN 1000 MG: 500 TABLET ORAL at 06:33

## 2022-04-23 RX ADMIN — QUETIAPINE FUMARATE 200 MG: 200 TABLET ORAL at 18:00

## 2022-04-23 RX ADMIN — HALOPERIDOL LACTATE 5 MG: 5 INJECTION, SOLUTION INTRAMUSCULAR at 05:05

## 2022-04-23 RX ADMIN — ENOXAPARIN SODIUM 30 MG: 100 INJECTION SUBCUTANEOUS at 08:41

## 2022-04-23 RX ADMIN — OXYCODONE HYDROCHLORIDE 5 MG: 5 TABLET ORAL at 00:40

## 2022-04-23 RX ADMIN — CLONIDINE HYDROCHLORIDE 0.1 MG: 0.1 TABLET ORAL at 08:41

## 2022-04-23 RX ADMIN — ACETAMINOPHEN 1000 MG: 500 TABLET ORAL at 13:17

## 2022-04-23 RX ADMIN — IBUPROFEN 400 MG: 400 TABLET, FILM COATED ORAL at 10:38

## 2022-04-23 RX ADMIN — CLONIDINE HYDROCHLORIDE 0.1 MG: 0.1 TABLET ORAL at 13:17

## 2022-04-23 RX ADMIN — FENTANYL CITRATE 50 MCG: 50 INJECTION INTRAMUSCULAR; INTRAVENOUS at 04:36

## 2022-04-23 RX ADMIN — FENTANYL CITRATE 50 MCG: 50 INJECTION INTRAMUSCULAR; INTRAVENOUS at 07:31

## 2022-04-23 ASSESSMENT — PULMONARY FUNCTION TESTS
PIF_VALUE: 14
PIF_VALUE: 19
PIF_VALUE: 17
PIF_VALUE: 17
PIF_VALUE: 28
PIF_VALUE: 17
PIF_VALUE: 18
PIF_VALUE: 18
PIF_VALUE: 15
PIF_VALUE: 19
PIF_VALUE: 20
PIF_VALUE: 18
PIF_VALUE: 11

## 2022-04-23 ASSESSMENT — PAIN SCALES - GENERAL
PAINLEVEL_OUTOF10: 0
PAINLEVEL_OUTOF10: 8

## 2022-04-23 NOTE — PLAN OF CARE
Problem: OXYGENATION/RESPIRATORY FUNCTION  Goal: Patient will maintain patent airway  Outcome: Progressing  Goal: Patient will achieve/maintain normal respiratory rate/effort  Description: Respiratory rate and effort will be within normal limits for the patient  Outcome: Progressing     Problem: SKIN INTEGRITY  Goal: Skin integrity is maintained or improved  Outcome: Progressing     Problem: MECHANICAL VENTILATION  Goal: Patient will maintain patent airway  Outcome: Progressing  Goal: Oral health is maintained or improved  Outcome: Progressing  Goal: ET tube will be managed safely  Outcome: Progressing  Goal: Ability to express needs and understand communication  Outcome: Progressing  Goal: Mobility/activity is maintained at optimum level for patient  Outcome: Progressing     Problem: Non-Violent Restraints  Goal: Removal from restraints as soon as assessed to be safe  Outcome: Progressing  Goal: No harm/injury to patient while restraints in use  Outcome: Progressing  Goal: Patient's dignity will be maintained  Outcome: Progressing     Problem: Skin Integrity:  Goal: Will show no infection signs and symptoms  Description: Will show no infection signs and symptoms  Outcome: Progressing  Goal: Absence of new skin breakdown  Description: Absence of new skin breakdown  Outcome: Progressing     Problem: Falls - Risk of:  Goal: Will remain free from falls  Description: Will remain free from falls  Outcome: Progressing  Goal: Absence of physical injury  Description: Absence of physical injury  Outcome: Progressing     Problem: Nutrition  Goal: Optimal nutrition therapy  Outcome: Progressing     Problem: Discharge Planning  Goal: Discharge to home or other facility with appropriate resources  Outcome: Progressing     Problem: Safety - Medical Restraint  Goal: Remains free of injury from restraints (Restraint for Interference with Medical Device)  Description: INTERVENTIONS:  1.  Determine that other, less restrictive measures have been tried or would not be effective before applying the restraint  2. Evaluate the patient's condition at the time of restraint application  3. Inform patient/family regarding the reason for restraint  4.  Q2H: Monitor safety, psychosocial status, comfort, nutrition and hydration  Outcome: Progressing     Problem: Pain  Goal: Verbalizes/displays adequate comfort level or baseline comfort level  Outcome: Progressing

## 2022-04-23 NOTE — CARE COORDINATION
Placed call to [facility] at [phone], left message, call back requested.

## 2022-04-23 NOTE — PLAN OF CARE
Problem: Non-Violent Restraints  Goal: Removal from restraints as soon as assessed to be safe  4/23/2022 0116 by Edna Croft RN  Outcome: Progressing  4/22/2022 1642 by Cyn Cesar RN  Outcome: Not Progressing  Goal: No harm/injury to patient while restraints in use  4/23/2022 0116 by Edna Croft RN  Outcome: Progressing  4/22/2022 1642 by Cyn Cesar RN  Outcome: Not Progressing  Goal: Patient's dignity will be maintained  4/23/2022 0116 by Edna Croft RN  Outcome: Progressing  4/22/2022 1642 by Cyn Cesar RN  Outcome: Not Progressing     Problem: Skin Integrity:  Goal: Will show no infection signs and symptoms  Description: Will show no infection signs and symptoms  4/23/2022 0116 by Edna Croft RN  Outcome: Progressing  4/22/2022 1642 by Cyn Cesar RN  Outcome: Progressing  Goal: Absence of new skin breakdown  Description: Absence of new skin breakdown  4/23/2022 0116 by Edna Croft RN  Outcome: Progressing  4/22/2022 1642 by Cyn Cesar RN  Outcome: Progressing     Problem: Falls - Risk of:  Goal: Will remain free from falls  Description: Will remain free from falls  4/23/2022 0116 by Edna Croft RN  Outcome: Progressing  4/22/2022 1642 by Cyn Cesar RN  Outcome: Progressing  Goal: Absence of physical injury  Description: Absence of physical injury  4/23/2022 0116 by Edna Croft RN  Outcome: Progressing  4/22/2022 1642 by Cyn Cesar RN  Outcome: Progressing     Problem: Safety - Medical Restraint  Goal: Remains free of injury from restraints (Restraint for Interference with Medical Device)  Description: INTERVENTIONS:  1. Determine that other, less restrictive measures have been tried or would not be effective before applying the restraint  2. Evaluate the patient's condition at the time of restraint application  3. Inform patient/family regarding the reason for restraint  4.  Q2H: Monitor safety, psychosocial status, comfort, nutrition and hydration  4/23/2022 0116 by Isaac Wiley RN  Outcome: Progressing  4/22/2022 1642 by Lazarus Minion, RN  Outcome: Progressing     Problem: Pain  Goal: Verbalizes/displays adequate comfort level or baseline comfort level  4/23/2022 0116 by Isaac Wiley RN  Outcome: Progressing  4/22/2022 1642 by Lazarus Minion, RN  Outcome: Progressing
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
## 2022-04-23 NOTE — PROGRESS NOTES
[facility] [employer]  Occupational Therapy Not Seen Note    DATE: 2022    NAME: Danielle Lorenzo  MRN: [id number]   : 1995      Patient not seen this date for Occupational Therapy due to: RN states pt in 4 pt restraints, agitated and will not follow commands for functional activity at this time.     Next Scheduled Treatment: 22    Electronically signed by W[place] on 2022 at 8:51 AM

## 2022-04-24 LAB
ALLEN TEST: POSITIVE
ANION GAP SERPL CALCULATED.3IONS-SCNC: 15 MMOL/L (ref 9–17)
BUN BLDV-MCNC: 29 MG/DL (ref 6–20)
CALCIUM SERPL-MCNC: 9.8 MG/DL (ref 8.6–10.4)
CHLORIDE BLD-SCNC: 104 MMOL/L (ref 98–107)
CO2: 24 MMOL/L (ref 20–31)
CREAT SERPL-MCNC: 0.75 MG/DL (ref 0.7–1.2)
FIO2: 30
FIO2: 30
GFR AFRICAN AMERICAN: >60 ML/MIN
GFR NON-AFRICAN AMERICAN: >60 ML/MIN
GFR SERPL CREATININE-BSD FRML MDRD: ABNORMAL ML/MIN/{1.73_M2}
GLUCOSE BLD-MCNC: 104 MG/DL (ref 74–100)
GLUCOSE BLD-MCNC: 106 MG/DL (ref 74–100)
GLUCOSE BLD-MCNC: 115 MG/DL (ref 70–99)
HCT VFR BLD CALC: 37 % (ref 40.7–50.3)
HEMOGLOBIN: 12.4 G/DL (ref 13–17)
LACTIC ACID, WHOLE BLOOD: 1.3 MMOL/L (ref 0.7–2.1)
MAGNESIUM: 2 MG/DL (ref 1.6–2.6)
MCH RBC QN AUTO: 32.4 PG (ref 25.2–33.5)
MCHC RBC AUTO-ENTMCNC: 33.5 G/DL (ref 28.4–34.8)
MCV RBC AUTO: 96.6 FL (ref 82.6–102.9)
MODE: ABNORMAL
NRBC AUTOMATED: 0 PER 100 WBC
O2 DEVICE/FLOW/%: ABNORMAL
O2 DEVICE/FLOW/%: ABNORMAL
PDW BLD-RTO: 11.9 % (ref 11.8–14.4)
PHOSPHORUS: 4.7 MG/DL (ref 2.5–4.5)
PLATELET # BLD: 407 K/UL (ref 138–453)
PMV BLD AUTO: 9.4 FL (ref 8.1–13.5)
POC HCO3: 26.3 MMOL/L (ref 21–28)
POC HCO3: 27.2 MMOL/L (ref 21–28)
POC LACTIC ACID: 0.68 MMOL/L (ref 0.56–1.39)
POC LACTIC ACID: 0.97 MMOL/L (ref 0.56–1.39)
POC O2 SATURATION: 96 % (ref 94–98)
POC O2 SATURATION: 98 % (ref 94–98)
POC PCO2: 30 MM HG (ref 35–48)
POC PCO2: 37.6 MM HG (ref 35–48)
POC PH: 7.45 (ref 7.35–7.45)
POC PH: 7.57 (ref 7.35–7.45)
POC PO2: 74.5 MM HG (ref 83–108)
POC PO2: 94.5 MM HG (ref 83–108)
POSITIVE BASE EXCESS, ART: 2 (ref 0–3)
POSITIVE BASE EXCESS, ART: 6 (ref 0–3)
POTASSIUM SERPL-SCNC: 4.1 MMOL/L (ref 3.7–5.3)
RBC # BLD: 3.83 M/UL (ref 4.21–5.77)
SAMPLE SITE: ABNORMAL
SAMPLE SITE: ABNORMAL
SODIUM BLD-SCNC: 143 MMOL/L (ref 135–144)
WBC # BLD: 15.6 K/UL (ref 3.5–11.3)

## 2022-04-24 PROCEDURE — 36415 COLL VENOUS BLD VENIPUNCTURE: CPT

## 2022-04-24 PROCEDURE — 2500000003 HC RX 250 WO HCPCS: Performed by: STUDENT IN AN ORGANIZED HEALTH CARE EDUCATION/TRAINING PROGRAM

## 2022-04-24 PROCEDURE — 6370000000 HC RX 637 (ALT 250 FOR IP): Performed by: EMERGENCY MEDICINE

## 2022-04-24 PROCEDURE — 2580000003 HC RX 258: Performed by: STUDENT IN AN ORGANIZED HEALTH CARE EDUCATION/TRAINING PROGRAM

## 2022-04-24 PROCEDURE — 83735 ASSAY OF MAGNESIUM: CPT

## 2022-04-24 PROCEDURE — 6360000002 HC RX W HCPCS: Performed by: EMERGENCY MEDICINE

## 2022-04-24 PROCEDURE — 82803 BLOOD GASES ANY COMBINATION: CPT

## 2022-04-24 PROCEDURE — 2580000003 HC RX 258: Performed by: EMERGENCY MEDICINE

## 2022-04-24 PROCEDURE — 6360000002 HC RX W HCPCS: Performed by: STUDENT IN AN ORGANIZED HEALTH CARE EDUCATION/TRAINING PROGRAM

## 2022-04-24 PROCEDURE — 2000000000 HC ICU R&B

## 2022-04-24 PROCEDURE — 85027 COMPLETE CBC AUTOMATED: CPT

## 2022-04-24 PROCEDURE — 83605 ASSAY OF LACTIC ACID: CPT

## 2022-04-24 PROCEDURE — 6370000000 HC RX 637 (ALT 250 FOR IP): Performed by: STUDENT IN AN ORGANIZED HEALTH CARE EDUCATION/TRAINING PROGRAM

## 2022-04-24 PROCEDURE — 82947 ASSAY GLUCOSE BLOOD QUANT: CPT

## 2022-04-24 PROCEDURE — 80048 BASIC METABOLIC PNL TOTAL CA: CPT

## 2022-04-24 PROCEDURE — 36600 WITHDRAWAL OF ARTERIAL BLOOD: CPT

## 2022-04-24 PROCEDURE — 84100 ASSAY OF PHOSPHORUS: CPT

## 2022-04-24 PROCEDURE — 94003 VENT MGMT INPAT SUBQ DAY: CPT

## 2022-04-24 RX ORDER — DEXMEDETOMIDINE HYDROCHLORIDE 4 UG/ML
.1-1.5 INJECTION, SOLUTION INTRAVENOUS CONTINUOUS
Status: DISCONTINUED | OUTPATIENT
Start: 2022-04-24 | End: 2022-05-05

## 2022-04-24 RX ORDER — SODIUM CHLORIDE, SODIUM LACTATE, POTASSIUM CHLORIDE, AND CALCIUM CHLORIDE .6; .31; .03; .02 G/100ML; G/100ML; G/100ML; G/100ML
1000 INJECTION, SOLUTION INTRAVENOUS ONCE
Status: COMPLETED | OUTPATIENT
Start: 2022-04-24 | End: 2022-04-24

## 2022-04-24 RX ORDER — FUROSEMIDE 10 MG/ML
20 INJECTION INTRAMUSCULAR; INTRAVENOUS ONCE
Status: COMPLETED | OUTPATIENT
Start: 2022-04-24 | End: 2022-04-24

## 2022-04-24 RX ORDER — GLYCOPYRROLATE 0.2 MG/ML
0.2 INJECTION INTRAMUSCULAR; INTRAVENOUS ONCE
Status: COMPLETED | OUTPATIENT
Start: 2022-04-24 | End: 2022-04-24

## 2022-04-24 RX ADMIN — GABAPENTIN 600 MG: 600 TABLET ORAL at 04:30

## 2022-04-24 RX ADMIN — FENTANYL CITRATE 50 MCG: 50 INJECTION INTRAMUSCULAR; INTRAVENOUS at 09:14

## 2022-04-24 RX ADMIN — SODIUM CHLORIDE, POTASSIUM CHLORIDE, SODIUM LACTATE AND CALCIUM CHLORIDE 1000 ML: 600; 310; 30; 20 INJECTION, SOLUTION INTRAVENOUS at 15:31

## 2022-04-24 RX ADMIN — SODIUM CHLORIDE, PRESERVATIVE FREE 10 ML: 5 INJECTION INTRAVENOUS at 10:39

## 2022-04-24 RX ADMIN — FOLIC ACID 1 MG: 1 TABLET ORAL at 10:38

## 2022-04-24 RX ADMIN — FENTANYL CITRATE 50 MCG: 50 INJECTION INTRAMUSCULAR; INTRAVENOUS at 20:55

## 2022-04-24 RX ADMIN — PIPERACILLIN AND TAZOBACTAM 3375 MG: 3; .375 INJECTION, POWDER, LYOPHILIZED, FOR SOLUTION INTRAVENOUS at 23:13

## 2022-04-24 RX ADMIN — IBUPROFEN 400 MG: 200 SUSPENSION ORAL at 20:54

## 2022-04-24 RX ADMIN — FAMOTIDINE 20 MG: 20 TABLET, FILM COATED ORAL at 09:14

## 2022-04-24 RX ADMIN — FENTANYL CITRATE 50 MCG: 50 INJECTION INTRAMUSCULAR; INTRAVENOUS at 01:29

## 2022-04-24 RX ADMIN — DIAZEPAM 5 MG: 5 TABLET ORAL at 16:18

## 2022-04-24 RX ADMIN — GABAPENTIN 600 MG: 600 TABLET ORAL at 12:13

## 2022-04-24 RX ADMIN — QUETIAPINE FUMARATE 200 MG: 200 TABLET ORAL at 09:14

## 2022-04-24 RX ADMIN — QUETIAPINE FUMARATE 200 MG: 200 TABLET ORAL at 01:24

## 2022-04-24 RX ADMIN — ACETAMINOPHEN 1000 MG: 500 TABLET ORAL at 20:54

## 2022-04-24 RX ADMIN — CLONIDINE HYDROCHLORIDE 0.1 MG: 0.1 TABLET ORAL at 21:00

## 2022-04-24 RX ADMIN — IBUPROFEN 400 MG: 400 TABLET, FILM COATED ORAL at 02:18

## 2022-04-24 RX ADMIN — ERYTHROMYCIN: 5 OINTMENT OPHTHALMIC at 21:00

## 2022-04-24 RX ADMIN — FENTANYL CITRATE 50 MCG: 50 INJECTION INTRAMUSCULAR; INTRAVENOUS at 07:02

## 2022-04-24 RX ADMIN — IBUPROFEN 400 MG: 400 TABLET, FILM COATED ORAL at 06:01

## 2022-04-24 RX ADMIN — BACITRACIN: 500 OINTMENT TOPICAL at 14:45

## 2022-04-24 RX ADMIN — BACITRACIN: 500 OINTMENT TOPICAL at 09:14

## 2022-04-24 RX ADMIN — ENOXAPARIN SODIUM 30 MG: 100 INJECTION SUBCUTANEOUS at 10:38

## 2022-04-24 RX ADMIN — DIAZEPAM 5 MG: 5 TABLET ORAL at 20:53

## 2022-04-24 RX ADMIN — ACETAMINOPHEN 1000 MG: 500 TABLET ORAL at 06:01

## 2022-04-24 RX ADMIN — POLYETHYLENE GLYCOL 3350 17 G: 17 POWDER, FOR SOLUTION ORAL at 09:14

## 2022-04-24 RX ADMIN — BACITRACIN: 500 OINTMENT TOPICAL at 21:00

## 2022-04-24 RX ADMIN — GLYCOPYRROLATE 0.2 MG: 0.2 INJECTION INTRAMUSCULAR; INTRAVENOUS at 20:54

## 2022-04-24 RX ADMIN — ENOXAPARIN SODIUM 30 MG: 100 INJECTION SUBCUTANEOUS at 21:00

## 2022-04-24 RX ADMIN — DEXMEDETOMIDINE HYDROCHLORIDE 0.2 MCG/KG/HR: 4 INJECTION, SOLUTION INTRAVENOUS at 23:11

## 2022-04-24 RX ADMIN — CLONIDINE HYDROCHLORIDE 0.1 MG: 0.1 TABLET ORAL at 10:38

## 2022-04-24 RX ADMIN — FUROSEMIDE 20 MG: 10 INJECTION, SOLUTION INTRAMUSCULAR; INTRAVENOUS at 23:25

## 2022-04-24 RX ADMIN — ACETAMINOPHEN 1000 MG: 500 TABLET ORAL at 14:45

## 2022-04-24 RX ADMIN — DIAZEPAM 5 MG: 5 TABLET ORAL at 04:20

## 2022-04-24 RX ADMIN — IBUPROFEN 400 MG: 200 SUSPENSION ORAL at 12:13

## 2022-04-24 RX ADMIN — CLONIDINE HYDROCHLORIDE 0.1 MG: 0.1 TABLET ORAL at 16:18

## 2022-04-24 RX ADMIN — FAMOTIDINE 20 MG: 20 TABLET, FILM COATED ORAL at 21:00

## 2022-04-24 RX ADMIN — QUETIAPINE FUMARATE 200 MG: 200 TABLET ORAL at 21:02

## 2022-04-24 RX ADMIN — PIPERACILLIN AND TAZOBACTAM 3375 MG: 3; .375 INJECTION, POWDER, LYOPHILIZED, FOR SOLUTION INTRAVENOUS at 12:15

## 2022-04-24 RX ADMIN — IBUPROFEN 400 MG: 200 SUSPENSION ORAL at 16:18

## 2022-04-24 RX ADMIN — FENTANYL CITRATE 50 MCG: 50 INJECTION INTRAMUSCULAR; INTRAVENOUS at 14:45

## 2022-04-24 RX ADMIN — DIAZEPAM 5 MG: 5 TABLET ORAL at 09:14

## 2022-04-24 RX ADMIN — GABAPENTIN 600 MG: 600 TABLET ORAL at 21:00

## 2022-04-24 RX ADMIN — SODIUM CHLORIDE, POTASSIUM CHLORIDE, SODIUM LACTATE AND CALCIUM CHLORIDE 1000 ML: 600; 310; 30; 20 INJECTION, SOLUTION INTRAVENOUS at 17:15

## 2022-04-24 ASSESSMENT — PULMONARY FUNCTION TESTS
PIF_VALUE: 17
PIF_VALUE: 18
PIF_VALUE: 16
PIF_VALUE: 17
PIF_VALUE: 17
PIF_VALUE: 16
PIF_VALUE: 17
PIF_VALUE: 18
PIF_VALUE: 17
PIF_VALUE: 17
PIF_VALUE: 18
PIF_VALUE: 17
PIF_VALUE: 16
PIF_VALUE: 18
PIF_VALUE: 19
PIF_VALUE: 16
PIF_VALUE: 19
PIF_VALUE: 18
PIF_VALUE: 20

## 2022-04-24 NOTE — PLAN OF CARE
Problem: Non-Violent Restraints  Goal: Removal from restraints as soon as assessed to be safe  4/24/2022 0439 by Kamryn Melgar RN  Outcome: Progressing     Problem: Non-Violent Restraints  Goal: No harm/injury to patient while restraints in use  4/24/2022 0439 by Kamryn Melgar RN  Outcome: Progressing     Problem: Non-Violent Restraints  Goal: Patient's dignity will be maintained  4/24/2022 0439 by Kamryn Melgar RN  Outcome: Progressing
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
## 2022-04-24 NOTE — PLAN OF CARE
Problem: OXYGENATION/RESPIRATORY FUNCTION  Goal: Patient will maintain patent airway  4/24/2022 0439 by Sasha Frankel RN  Outcome: Progressing  4/23/2022 1811 by John Quiles RN  Outcome: Progressing  Goal: Patient will achieve/maintain normal respiratory rate/effort  Description: Respiratory rate and effort will be within normal limits for the patient  4/24/2022 0439 by Sasha Frankel RN  Outcome: Progressing  4/23/2022 1811 by John Quiles RN  Outcome: Progressing     Problem: SKIN INTEGRITY  Goal: Skin integrity is maintained or improved  4/24/2022 0439 by Sasha Frankel RN  Outcome: Progressing  4/23/2022 1811 by John Quiles RN  Outcome: Progressing     Problem: Non-Violent Restraints  Goal: Removal from restraints as soon as assessed to be safe  4/23/2022 1811 by John Quiles RN  Outcome: Progressing  Goal: No harm/injury to patient while restraints in use  4/23/2022 1811 by John Quiles RN  Outcome: Progressing  Goal: Patient's dignity will be maintained  4/23/2022 1811 by John Quiles RN  Outcome: Progressing     Problem: Skin Integrity:  Goal: Will show no infection signs and symptoms  Description: Will show no infection signs and symptoms  4/24/2022 0439 by Sasha Frankel RN  Outcome: Progressing  4/23/2022 1811 by John Quiles RN  Outcome: Progressing  Goal: Absence of new skin breakdown  Description: Absence of new skin breakdown  4/24/2022 0439 by Sasha Frankel RN  Outcome: Progressing  4/23/2022 1811 by John Quiles RN  Outcome: Progressing     Problem: Falls - Risk of:  Goal: Will remain free from falls  Description: Will remain free from falls  4/24/2022 0439 by Sasha Frankel RN  Outcome: Progressing  4/23/2022 1811 by John Quiles RN  Outcome: Progressing  Goal: Absence of physical injury  Description: Absence of physical injury  4/24/2022 0439 by Sasha Frankel RN  Outcome: Progressing  4/23/2022 1811 by John Quiles RN  Outcome: Progressing     Problem: Nutrition  Goal: Optimal nutrition therapy  4/24/2022 0439 by Eugenie Horn RN  Outcome: Progressing  4/23/2022 1811 by Alan Plaza RN  Outcome: Progressing     Problem: MECHANICAL VENTILATION  Goal: Patient will maintain patent airway  4/24/2022 0439 by Eugenie Horn RN  Outcome: Progressing  4/23/2022 1811 by Alan Plaza RN  Outcome: Progressing  Goal: Oral health is maintained or improved  4/24/2022 0439 by Eugenie Horn RN  Outcome: Progressing  4/23/2022 1811 by Alan Plaza RN  Outcome: Progressing  Goal: ET tube will be managed safely  4/24/2022 0439 by Eugenie Horn RN  Outcome: Progressing  4/23/2022 1811 by Alan Plaza RN  Outcome: Progressing  Goal: Ability to express needs and understand communication  4/24/2022 0439 by Eugenie Horn RN  Outcome: Progressing  4/23/2022 1811 by Alan Plaza RN  Outcome: Progressing  Goal: Mobility/activity is maintained at optimum level for patient  4/24/2022 0439 by Eugenie Horn RN  Outcome: Progressing  4/23/2022 1811 by Alan Plaza RN  Outcome: Progressing     Problem: Discharge Planning  Goal: Discharge to home or other facility with appropriate resources  4/24/2022 0439 by Eugenie Horn RN  Outcome: Progressing  4/23/2022 1811 by Alan Plaza RN  Outcome: Progressing     Problem: Safety - Medical Restraint  Goal: Remains free of injury from restraints (Restraint for Interference with Medical Device)  Description: INTERVENTIONS:  1. Determine that other, less restrictive measures have been tried or would not be effective before applying the restraint  2. Evaluate the patient's condition at the time of restraint application  3. Inform patient/family regarding the reason for restraint  4.  Q2H: Monitor safety, psychosocial status, comfort, nutrition and hydration  4/24/2022 0439 by Eugenie Horn RN  Outcome: Progressing  4/23/2022 1811 by Alan Plaza RN  Outcome: Progressing Problem: Pain  Goal: Verbalizes/displays adequate comfort level or baseline comfort level  4/24/2022 0439 by Sindy Xavier RN  Outcome: Progressing  4/23/2022 1811 by Blessing Burnett RN  Outcome: Progressing

## 2022-04-25 LAB
ALLEN TEST: POSITIVE
ANION GAP SERPL CALCULATED.3IONS-SCNC: 12 MMOL/L (ref 9–17)
BUN BLDV-MCNC: 26 MG/DL (ref 6–20)
CALCIUM SERPL-MCNC: 9.8 MG/DL (ref 8.6–10.4)
CHLORIDE BLD-SCNC: 102 MMOL/L (ref 98–107)
CO2: 25 MMOL/L (ref 20–31)
CREAT SERPL-MCNC: 0.75 MG/DL (ref 0.7–1.2)
FIO2: 30
GFR AFRICAN AMERICAN: >60 ML/MIN
GFR NON-AFRICAN AMERICAN: >60 ML/MIN
GFR SERPL CREATININE-BSD FRML MDRD: ABNORMAL ML/MIN/{1.73_M2}
GLUCOSE BLD-MCNC: 105 MG/DL (ref 70–99)
GLUCOSE BLD-MCNC: 125 MG/DL (ref 74–100)
HCT VFR BLD CALC: 36.3 % (ref 40.7–50.3)
HEMOGLOBIN: 12.4 G/DL (ref 13–17)
MAGNESIUM: 2.2 MG/DL (ref 1.6–2.6)
MCH RBC QN AUTO: 32.7 PG (ref 25.2–33.5)
MCHC RBC AUTO-ENTMCNC: 34.2 G/DL (ref 28.4–34.8)
MCV RBC AUTO: 95.8 FL (ref 82.6–102.9)
MODE: ABNORMAL
NRBC AUTOMATED: 0 PER 100 WBC
O2 DEVICE/FLOW/%: ABNORMAL
PDW BLD-RTO: 12.2 % (ref 11.8–14.4)
PHOSPHORUS: 4.5 MG/DL (ref 2.5–4.5)
PLATELET # BLD: 385 K/UL (ref 138–453)
PMV BLD AUTO: 9.6 FL (ref 8.1–13.5)
POC HCO3: 26.8 MMOL/L (ref 21–28)
POC LACTIC ACID: 0.82 MMOL/L (ref 0.56–1.39)
POC O2 SATURATION: 96 % (ref 94–98)
POC PCO2: 37.5 MM HG (ref 35–48)
POC PH: 7.46 (ref 7.35–7.45)
POC PO2: 73.4 MM HG (ref 83–108)
POSITIVE BASE EXCESS, ART: 3 (ref 0–3)
POTASSIUM SERPL-SCNC: 4.4 MMOL/L (ref 3.7–5.3)
RBC # BLD: 3.79 M/UL (ref 4.21–5.77)
SAMPLE SITE: ABNORMAL
SODIUM BLD-SCNC: 139 MMOL/L (ref 135–144)
WBC # BLD: 15.4 K/UL (ref 3.5–11.3)

## 2022-04-25 PROCEDURE — 6360000002 HC RX W HCPCS: Performed by: STUDENT IN AN ORGANIZED HEALTH CARE EDUCATION/TRAINING PROGRAM

## 2022-04-25 PROCEDURE — 6370000000 HC RX 637 (ALT 250 FOR IP): Performed by: EMERGENCY MEDICINE

## 2022-04-25 PROCEDURE — 93005 ELECTROCARDIOGRAM TRACING: CPT | Performed by: SURGERY

## 2022-04-25 PROCEDURE — 36600 WITHDRAWAL OF ARTERIAL BLOOD: CPT

## 2022-04-25 PROCEDURE — 85027 COMPLETE CBC AUTOMATED: CPT

## 2022-04-25 PROCEDURE — 2580000003 HC RX 258: Performed by: STUDENT IN AN ORGANIZED HEALTH CARE EDUCATION/TRAINING PROGRAM

## 2022-04-25 PROCEDURE — 2500000003 HC RX 250 WO HCPCS: Performed by: STUDENT IN AN ORGANIZED HEALTH CARE EDUCATION/TRAINING PROGRAM

## 2022-04-25 PROCEDURE — 6370000000 HC RX 637 (ALT 250 FOR IP): Performed by: STUDENT IN AN ORGANIZED HEALTH CARE EDUCATION/TRAINING PROGRAM

## 2022-04-25 PROCEDURE — 99232 SBSQ HOSP IP/OBS MODERATE 35: CPT | Performed by: PHYSICAL MEDICINE & REHABILITATION

## 2022-04-25 PROCEDURE — 2700000000 HC OXYGEN THERAPY PER DAY

## 2022-04-25 PROCEDURE — 80048 BASIC METABOLIC PNL TOTAL CA: CPT

## 2022-04-25 PROCEDURE — 2000000000 HC ICU R&B

## 2022-04-25 PROCEDURE — 82803 BLOOD GASES ANY COMBINATION: CPT

## 2022-04-25 PROCEDURE — 6360000002 HC RX W HCPCS: Performed by: EMERGENCY MEDICINE

## 2022-04-25 PROCEDURE — 94761 N-INVAS EAR/PLS OXIMETRY MLT: CPT

## 2022-04-25 PROCEDURE — 97110 THERAPEUTIC EXERCISES: CPT

## 2022-04-25 PROCEDURE — 84100 ASSAY OF PHOSPHORUS: CPT

## 2022-04-25 PROCEDURE — 83735 ASSAY OF MAGNESIUM: CPT

## 2022-04-25 PROCEDURE — 2580000003 HC RX 258: Performed by: EMERGENCY MEDICINE

## 2022-04-25 PROCEDURE — 94003 VENT MGMT INPAT SUBQ DAY: CPT

## 2022-04-25 PROCEDURE — 83605 ASSAY OF LACTIC ACID: CPT

## 2022-04-25 PROCEDURE — 97530 THERAPEUTIC ACTIVITIES: CPT

## 2022-04-25 PROCEDURE — 82947 ASSAY GLUCOSE BLOOD QUANT: CPT

## 2022-04-25 PROCEDURE — 36415 COLL VENOUS BLD VENIPUNCTURE: CPT

## 2022-04-25 RX ORDER — OXYCODONE HYDROCHLORIDE 5 MG/1
5 TABLET ORAL EVERY 6 HOURS PRN
Status: DISCONTINUED | OUTPATIENT
Start: 2022-04-25 | End: 2022-04-25

## 2022-04-25 RX ORDER — PROPRANOLOL HYDROCHLORIDE 20 MG/1
20 TABLET ORAL EVERY 8 HOURS
Status: DISCONTINUED | OUTPATIENT
Start: 2022-04-25 | End: 2022-04-26

## 2022-04-25 RX ORDER — DIAZEPAM 5 MG/1
10 TABLET ORAL EVERY 6 HOURS
Status: DISCONTINUED | OUTPATIENT
Start: 2022-04-25 | End: 2022-04-27

## 2022-04-25 RX ORDER — PROPRANOLOL HYDROCHLORIDE 20 MG/1
20 TABLET ORAL 3 TIMES DAILY
Status: DISCONTINUED | OUTPATIENT
Start: 2022-04-25 | End: 2022-04-25

## 2022-04-25 RX ORDER — OXYCODONE HYDROCHLORIDE 5 MG/1
5 TABLET ORAL EVERY 6 HOURS SCHEDULED
Status: DISCONTINUED | OUTPATIENT
Start: 2022-04-25 | End: 2022-04-26

## 2022-04-25 RX ADMIN — QUETIAPINE FUMARATE 200 MG: 200 TABLET ORAL at 09:54

## 2022-04-25 RX ADMIN — GABAPENTIN 600 MG: 600 TABLET ORAL at 22:43

## 2022-04-25 RX ADMIN — IBUPROFEN 400 MG: 200 SUSPENSION ORAL at 09:54

## 2022-04-25 RX ADMIN — BACITRACIN: 500 OINTMENT TOPICAL at 13:41

## 2022-04-25 RX ADMIN — ERYTHROMYCIN: 5 OINTMENT OPHTHALMIC at 20:55

## 2022-04-25 RX ADMIN — ACETAMINOPHEN 1000 MG: 500 TABLET ORAL at 22:40

## 2022-04-25 RX ADMIN — DIAZEPAM 5 MG: 5 TABLET ORAL at 03:14

## 2022-04-25 RX ADMIN — ENOXAPARIN SODIUM 30 MG: 100 INJECTION SUBCUTANEOUS at 09:54

## 2022-04-25 RX ADMIN — IBUPROFEN 400 MG: 200 SUSPENSION ORAL at 16:17

## 2022-04-25 RX ADMIN — PROPRANOLOL HYDROCHLORIDE 20 MG: 20 TABLET ORAL at 13:09

## 2022-04-25 RX ADMIN — PIPERACILLIN AND TAZOBACTAM 3375 MG: 3; .375 INJECTION, POWDER, LYOPHILIZED, FOR SOLUTION INTRAVENOUS at 07:45

## 2022-04-25 RX ADMIN — FAMOTIDINE 20 MG: 20 TABLET, FILM COATED ORAL at 09:54

## 2022-04-25 RX ADMIN — BACITRACIN: 500 OINTMENT TOPICAL at 08:21

## 2022-04-25 RX ADMIN — FOLIC ACID 1 MG: 1 TABLET ORAL at 08:21

## 2022-04-25 RX ADMIN — PIPERACILLIN AND TAZOBACTAM 3375 MG: 3; .375 INJECTION, POWDER, LYOPHILIZED, FOR SOLUTION INTRAVENOUS at 15:43

## 2022-04-25 RX ADMIN — FENTANYL CITRATE 50 MCG: 50 INJECTION INTRAMUSCULAR; INTRAVENOUS at 18:47

## 2022-04-25 RX ADMIN — DIAZEPAM 10 MG: 5 TABLET ORAL at 22:40

## 2022-04-25 RX ADMIN — BACITRACIN: 500 OINTMENT TOPICAL at 20:56

## 2022-04-25 RX ADMIN — DEXMEDETOMIDINE HYDROCHLORIDE 1 MCG/KG/HR: 4 INJECTION, SOLUTION INTRAVENOUS at 19:43

## 2022-04-25 RX ADMIN — SODIUM CHLORIDE, PRESERVATIVE FREE 10 ML: 5 INJECTION INTRAVENOUS at 10:16

## 2022-04-25 RX ADMIN — CLONIDINE HYDROCHLORIDE 0.1 MG: 0.1 TABLET ORAL at 15:47

## 2022-04-25 RX ADMIN — OXYCODONE 5 MG: 5 TABLET ORAL at 17:56

## 2022-04-25 RX ADMIN — QUETIAPINE FUMARATE 200 MG: 200 TABLET ORAL at 03:09

## 2022-04-25 RX ADMIN — FAMOTIDINE 20 MG: 20 TABLET, FILM COATED ORAL at 22:43

## 2022-04-25 RX ADMIN — GABAPENTIN 600 MG: 600 TABLET ORAL at 13:10

## 2022-04-25 RX ADMIN — CLONIDINE HYDROCHLORIDE 0.1 MG: 0.1 TABLET ORAL at 09:54

## 2022-04-25 RX ADMIN — OXYCODONE 5 MG: 5 TABLET ORAL at 13:19

## 2022-04-25 RX ADMIN — IBUPROFEN 400 MG: 200 SUSPENSION ORAL at 03:10

## 2022-04-25 RX ADMIN — DEXMEDETOMIDINE HYDROCHLORIDE 0.2 MCG/KG/HR: 4 INJECTION, SOLUTION INTRAVENOUS at 10:19

## 2022-04-25 RX ADMIN — DIAZEPAM 5 MG: 5 TABLET ORAL at 09:54

## 2022-04-25 RX ADMIN — DIAZEPAM 10 MG: 5 TABLET ORAL at 15:47

## 2022-04-25 RX ADMIN — IBUPROFEN 400 MG: 200 SUSPENSION ORAL at 13:08

## 2022-04-25 RX ADMIN — QUETIAPINE FUMARATE 150 MG: 100 TABLET ORAL at 17:58

## 2022-04-25 RX ADMIN — IBUPROFEN 400 MG: 200 SUSPENSION ORAL at 21:33

## 2022-04-25 RX ADMIN — CLONIDINE HYDROCHLORIDE 0.1 MG: 0.1 TABLET ORAL at 22:43

## 2022-04-25 RX ADMIN — ENOXAPARIN SODIUM 30 MG: 100 INJECTION SUBCUTANEOUS at 22:44

## 2022-04-25 RX ADMIN — SODIUM CHLORIDE, PRESERVATIVE FREE 10 ML: 5 INJECTION INTRAVENOUS at 20:54

## 2022-04-25 RX ADMIN — GABAPENTIN 600 MG: 600 TABLET ORAL at 04:43

## 2022-04-25 RX ADMIN — ACETAMINOPHEN 1000 MG: 500 TABLET ORAL at 06:45

## 2022-04-25 RX ADMIN — IBUPROFEN 400 MG: 200 SUSPENSION ORAL at 00:16

## 2022-04-25 RX ADMIN — PIPERACILLIN AND TAZOBACTAM 3375 MG: 3; .375 INJECTION, POWDER, LYOPHILIZED, FOR SOLUTION INTRAVENOUS at 23:25

## 2022-04-25 RX ADMIN — ACETAMINOPHEN 1000 MG: 500 TABLET ORAL at 13:10

## 2022-04-25 ASSESSMENT — PULMONARY FUNCTION TESTS
PIF_VALUE: 16
PIF_VALUE: 17
PIF_VALUE: 17
PIF_VALUE: 16
PIF_VALUE: 16
PIF_VALUE: 17
PIF_VALUE: 15
PIF_VALUE: 16
PIF_VALUE: 17
PIF_VALUE: 16

## 2022-04-25 ASSESSMENT — PAIN SCALES - GENERAL: PAINLEVEL_OUTOF10: 1
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
## 2022-04-25 NOTE — PROGRESS NOTES
Comprehensive Nutrition Assessment    Type and Reason for Visit:  Reassess    Nutrition Recommendations/Plan:   Continue Current Tube Feeding. Will continue to monitor TF tolerance/adequacy. Malnutrition Assessment:  Malnutrition Status: At risk for malnutrition     Context:  Acute Illness     Findings of the 6 clinical characteristics of malnutrition:  Energy Intake:  No significant decrease in energy intake (with use of TF)  Weight Loss:  No significant weight loss     Body Fat Loss:  No significant body fat loss     Muscle Mass Loss:  No significant muscle mass loss    Fluid Accumulation:  1 - Mild Extremities,Generalized   Strength:  Not Performed    Nutrition Assessment:    Chart reviewed. Pt remains NPO and on TF for nutrition. Immune Enhancing TF at goal rate of 75 mL/hr. Last BM noted: 4/24/22. Meds/labs reviewed. Nutrition Related Findings:    Last BM: 4/24/22 Wound Type: None       Current Nutrition Intake & Therapies:    Average Meal Intake: NPO  Average Supplements Intake: NPO  Diet NPO  ADULT TUBE FEEDING; PEG; Immune Enhancing; Continuous; 75; No; 30; Q 4 hours  Current Tube Feeding (TF) Orders:  · Feeding Route: PEG  · Formula: Immune Enhancing  · Schedule: Continuous  · Feeding Regimen: 75 ml/hr  · Water Flushes: 30 mL every 4 hrs  · Current TF & Flush Orders Provides: Pivot 1.5 at 75 mL/hr = 2700 kcal, 169 g pro, 1350 mL free water/day. Additional 180 mL free water/day if 30 mL given every 4 hrs. · Goal TF & Flush Orders Provides: Pivot 1.5 at 75 mL/hr = 2700 kcal, 169 g pro, 1350 mL free water/day. Additional 180 mL free water/day if 30 mL given every 4 hrs. Additional Calorie Sources:  · none      Anthropometric Measures:  Height: 5' 11\" (180.3 cm)  Ideal Body Weight (IBW): 172 lbs (78 kg)    Admission Body Weight: 235 lb 3.7 oz (106.7 kg) (4/12, [facility])  Current Body Weight: 231 lb 7.7 oz (105 kg), 134.6 % IBW.     Current BMI (kg/m2): 32.3  Usual Body Weight:  (UTO)     Weight Adjustment For: No Adjustment                 BMI Categories: Obese Class 1 (BMI 30.0-34. 9)    Estimated Daily Nutrient Needs:  Energy Requirements Based On: Formula  Weight Used for Energy Requirements: Current  Energy (kcal/day): 7125-6161 kcal/day  Weight Used for Protein Requirements: Ideal (1.5-2)  Protein (g/day): 120-160 g pro/day    Nutrition Diagnosis:   · Inadequate oral intake related to impaired respiratory function,acute injury/trauma as evidenced by NPO or clear liquid status due to medical condition,nutrition support - enteral nutrition    Nutrition Interventions:   Food and/or Nutrient Delivery: Continue Current Tube Feeding  Nutrition Education/Counseling: No recommendation at this time  Coordination of Nutrition Care: Continue to monitor while inpatient       Goals:  Previous Goal Met: Goal(s) Achieved  Goals: Meet at least 75% of estimated needs       Nutrition Monitoring and Evaluation:   Behavioral-Environmental Outcomes: None Identified  Food/Nutrient Intake Outcomes: Enteral Nutrition Intake/Tolerance  Physical Signs/Symptoms Outcomes: Biochemical Data,Nutrition Focused Physical Findings,Skin,Weight    Discharge Planning:     Too soon to determine     [address], LD  Contact: [phone]

## 2022-04-26 LAB
ANION GAP SERPL CALCULATED.3IONS-SCNC: 10 MMOL/L (ref 9–17)
BUN BLDV-MCNC: 38 MG/DL (ref 6–20)
CALCIUM SERPL-MCNC: 9.3 MG/DL (ref 8.6–10.4)
CHLORIDE BLD-SCNC: 106 MMOL/L (ref 98–107)
CO2: 26 MMOL/L (ref 20–31)
CREAT SERPL-MCNC: 0.78 MG/DL (ref 0.7–1.2)
EKG ATRIAL RATE: 60 BPM
EKG ATRIAL RATE: 89 BPM
EKG P AXIS: 38 DEGREES
EKG P AXIS: 44 DEGREES
EKG P-R INTERVAL: 148 MS
EKG P-R INTERVAL: 162 MS
EKG Q-T INTERVAL: 388 MS
EKG Q-T INTERVAL: 430 MS
EKG QRS DURATION: 102 MS
EKG QRS DURATION: 108 MS
EKG QTC CALCULATION (BAZETT): 430 MS
EKG QTC CALCULATION (BAZETT): 472 MS
EKG R AXIS: 27 DEGREES
EKG R AXIS: 8 DEGREES
EKG T AXIS: 44 DEGREES
EKG T AXIS: 60 DEGREES
EKG VENTRICULAR RATE: 60 BPM
EKG VENTRICULAR RATE: 89 BPM
GFR AFRICAN AMERICAN: >60 ML/MIN
GFR NON-AFRICAN AMERICAN: >60 ML/MIN
GFR SERPL CREATININE-BSD FRML MDRD: ABNORMAL ML/MIN/{1.73_M2}
GLUCOSE BLD-MCNC: 102 MG/DL (ref 70–99)
HCT VFR BLD CALC: 32.3 % (ref 40.7–50.3)
HEMOGLOBIN: 10.8 G/DL (ref 13–17)
MAGNESIUM: 2.1 MG/DL (ref 1.6–2.6)
MCH RBC QN AUTO: 32.5 PG (ref 25.2–33.5)
MCHC RBC AUTO-ENTMCNC: 33.4 G/DL (ref 28.4–34.8)
MCV RBC AUTO: 97.3 FL (ref 82.6–102.9)
NRBC AUTOMATED: 0 PER 100 WBC
PDW BLD-RTO: 12.1 % (ref 11.8–14.4)
PHOSPHORUS: 4.2 MG/DL (ref 2.5–4.5)
PLATELET # BLD: 380 K/UL (ref 138–453)
PMV BLD AUTO: 9.5 FL (ref 8.1–13.5)
POTASSIUM SERPL-SCNC: 4.4 MMOL/L (ref 3.7–5.3)
RBC # BLD: 3.32 M/UL (ref 4.21–5.77)
SODIUM BLD-SCNC: 142 MMOL/L (ref 135–144)
WBC # BLD: 7.6 K/UL (ref 3.5–11.3)

## 2022-04-26 PROCEDURE — 94761 N-INVAS EAR/PLS OXIMETRY MLT: CPT

## 2022-04-26 PROCEDURE — 83735 ASSAY OF MAGNESIUM: CPT

## 2022-04-26 PROCEDURE — 6360000002 HC RX W HCPCS: Performed by: EMERGENCY MEDICINE

## 2022-04-26 PROCEDURE — 6370000000 HC RX 637 (ALT 250 FOR IP): Performed by: STUDENT IN AN ORGANIZED HEALTH CARE EDUCATION/TRAINING PROGRAM

## 2022-04-26 PROCEDURE — 85027 COMPLETE CBC AUTOMATED: CPT

## 2022-04-26 PROCEDURE — 93005 ELECTROCARDIOGRAM TRACING: CPT | Performed by: SURGERY

## 2022-04-26 PROCEDURE — 80048 BASIC METABOLIC PNL TOTAL CA: CPT

## 2022-04-26 PROCEDURE — 2500000003 HC RX 250 WO HCPCS: Performed by: STUDENT IN AN ORGANIZED HEALTH CARE EDUCATION/TRAINING PROGRAM

## 2022-04-26 PROCEDURE — 6370000000 HC RX 637 (ALT 250 FOR IP): Performed by: EMERGENCY MEDICINE

## 2022-04-26 PROCEDURE — 94003 VENT MGMT INPAT SUBQ DAY: CPT

## 2022-04-26 PROCEDURE — 36415 COLL VENOUS BLD VENIPUNCTURE: CPT

## 2022-04-26 PROCEDURE — 6360000002 HC RX W HCPCS: Performed by: STUDENT IN AN ORGANIZED HEALTH CARE EDUCATION/TRAINING PROGRAM

## 2022-04-26 PROCEDURE — 2000000000 HC ICU R&B

## 2022-04-26 PROCEDURE — 2580000003 HC RX 258: Performed by: STUDENT IN AN ORGANIZED HEALTH CARE EDUCATION/TRAINING PROGRAM

## 2022-04-26 PROCEDURE — 84100 ASSAY OF PHOSPHORUS: CPT

## 2022-04-26 PROCEDURE — 2700000000 HC OXYGEN THERAPY PER DAY

## 2022-04-26 PROCEDURE — 2580000003 HC RX 258: Performed by: EMERGENCY MEDICINE

## 2022-04-26 RX ORDER — PROPRANOLOL HYDROCHLORIDE 40 MG/1
40 TABLET ORAL EVERY 8 HOURS
Status: DISCONTINUED | OUTPATIENT
Start: 2022-04-26 | End: 2022-04-27

## 2022-04-26 RX ORDER — HALOPERIDOL 5 MG/ML
5 INJECTION INTRAMUSCULAR EVERY 6 HOURS PRN
Status: DISCONTINUED | OUTPATIENT
Start: 2022-04-26 | End: 2022-05-02

## 2022-04-26 RX ORDER — OXYCODONE HYDROCHLORIDE 5 MG/1
10 TABLET ORAL EVERY 6 HOURS SCHEDULED
Status: DISCONTINUED | OUTPATIENT
Start: 2022-04-26 | End: 2022-05-06

## 2022-04-26 RX ADMIN — SODIUM CHLORIDE, PRESERVATIVE FREE 10 ML: 5 INJECTION INTRAVENOUS at 08:15

## 2022-04-26 RX ADMIN — CLONIDINE HYDROCHLORIDE 0.1 MG: 0.1 TABLET ORAL at 22:22

## 2022-04-26 RX ADMIN — PIPERACILLIN AND TAZOBACTAM 3375 MG: 3; .375 INJECTION, POWDER, LYOPHILIZED, FOR SOLUTION INTRAVENOUS at 08:18

## 2022-04-26 RX ADMIN — QUETIAPINE FUMARATE 150 MG: 100 TABLET ORAL at 18:09

## 2022-04-26 RX ADMIN — IBUPROFEN 400 MG: 200 SUSPENSION ORAL at 09:15

## 2022-04-26 RX ADMIN — OXYCODONE HYDROCHLORIDE 10 MG: 5 TABLET ORAL at 18:08

## 2022-04-26 RX ADMIN — DIAZEPAM 10 MG: 5 TABLET ORAL at 04:15

## 2022-04-26 RX ADMIN — IBUPROFEN 400 MG: 200 SUSPENSION ORAL at 22:29

## 2022-04-26 RX ADMIN — OXYCODONE 5 MG: 5 TABLET ORAL at 00:33

## 2022-04-26 RX ADMIN — SODIUM CHLORIDE, PRESERVATIVE FREE 10 ML: 5 INJECTION INTRAVENOUS at 22:21

## 2022-04-26 RX ADMIN — BACITRACIN: 500 OINTMENT TOPICAL at 22:23

## 2022-04-26 RX ADMIN — IBUPROFEN 400 MG: 200 SUSPENSION ORAL at 23:27

## 2022-04-26 RX ADMIN — FENTANYL CITRATE 50 MCG: 50 INJECTION INTRAMUSCULAR; INTRAVENOUS at 22:42

## 2022-04-26 RX ADMIN — FAMOTIDINE 20 MG: 20 TABLET, FILM COATED ORAL at 08:15

## 2022-04-26 RX ADMIN — QUETIAPINE FUMARATE 150 MG: 100 TABLET ORAL at 10:28

## 2022-04-26 RX ADMIN — IBUPROFEN 400 MG: 200 SUSPENSION ORAL at 15:48

## 2022-04-26 RX ADMIN — POLYETHYLENE GLYCOL 3350 17 G: 17 POWDER, FOR SOLUTION ORAL at 08:11

## 2022-04-26 RX ADMIN — ACETAMINOPHEN 1000 MG: 500 TABLET ORAL at 15:48

## 2022-04-26 RX ADMIN — DEXMEDETOMIDINE HYDROCHLORIDE 1.3 MCG/KG/HR: 4 INJECTION, SOLUTION INTRAVENOUS at 22:48

## 2022-04-26 RX ADMIN — FAMOTIDINE 20 MG: 20 TABLET, FILM COATED ORAL at 23:29

## 2022-04-26 RX ADMIN — IBUPROFEN 400 MG: 200 SUSPENSION ORAL at 12:27

## 2022-04-26 RX ADMIN — DEXMEDETOMIDINE HYDROCHLORIDE 0.6 MCG/KG/HR: 4 INJECTION, SOLUTION INTRAVENOUS at 09:21

## 2022-04-26 RX ADMIN — CLONIDINE HYDROCHLORIDE 0.1 MG: 0.1 TABLET ORAL at 15:53

## 2022-04-26 RX ADMIN — IBUPROFEN 400 MG: 200 SUSPENSION ORAL at 04:15

## 2022-04-26 RX ADMIN — DEXMEDETOMIDINE HYDROCHLORIDE 1 MCG/KG/HR: 4 INJECTION, SOLUTION INTRAVENOUS at 19:25

## 2022-04-26 RX ADMIN — OXYCODONE 5 MG: 5 TABLET ORAL at 05:48

## 2022-04-26 RX ADMIN — Medication 10 MG: at 08:12

## 2022-04-26 RX ADMIN — SODIUM CHLORIDE, PRESERVATIVE FREE 10 ML: 5 INJECTION INTRAVENOUS at 22:20

## 2022-04-26 RX ADMIN — FOLIC ACID 1 MG: 1 TABLET ORAL at 08:19

## 2022-04-26 RX ADMIN — OXYCODONE 5 MG: 5 TABLET ORAL at 12:28

## 2022-04-26 RX ADMIN — ENOXAPARIN SODIUM 30 MG: 100 INJECTION SUBCUTANEOUS at 08:14

## 2022-04-26 RX ADMIN — ENOXAPARIN SODIUM 30 MG: 100 INJECTION SUBCUTANEOUS at 23:29

## 2022-04-26 RX ADMIN — PIPERACILLIN AND TAZOBACTAM 3375 MG: 3; .375 INJECTION, POWDER, LYOPHILIZED, FOR SOLUTION INTRAVENOUS at 22:34

## 2022-04-26 RX ADMIN — IBUPROFEN 400 MG: 200 SUSPENSION ORAL at 00:33

## 2022-04-26 RX ADMIN — QUETIAPINE FUMARATE 150 MG: 100 TABLET ORAL at 03:05

## 2022-04-26 RX ADMIN — DIAZEPAM 10 MG: 5 TABLET ORAL at 10:27

## 2022-04-26 RX ADMIN — DEXMEDETOMIDINE HYDROCHLORIDE 1 MCG/KG/HR: 4 INJECTION, SOLUTION INTRAVENOUS at 15:09

## 2022-04-26 RX ADMIN — ERYTHROMYCIN: 5 OINTMENT OPHTHALMIC at 22:22

## 2022-04-26 RX ADMIN — BACITRACIN: 500 OINTMENT TOPICAL at 08:10

## 2022-04-26 RX ADMIN — OXYCODONE HYDROCHLORIDE 10 MG: 5 TABLET ORAL at 23:26

## 2022-04-26 RX ADMIN — BACITRACIN: 500 OINTMENT TOPICAL at 15:50

## 2022-04-26 RX ADMIN — GABAPENTIN 600 MG: 600 TABLET ORAL at 04:15

## 2022-04-26 RX ADMIN — PIPERACILLIN AND TAZOBACTAM 3375 MG: 3; .375 INJECTION, POWDER, LYOPHILIZED, FOR SOLUTION INTRAVENOUS at 15:52

## 2022-04-26 RX ADMIN — ACETAMINOPHEN 1000 MG: 500 TABLET ORAL at 22:29

## 2022-04-26 RX ADMIN — DIAZEPAM 10 MG: 5 TABLET ORAL at 15:48

## 2022-04-26 RX ADMIN — ACETAMINOPHEN 1000 MG: 500 TABLET ORAL at 05:47

## 2022-04-26 RX ADMIN — CLONIDINE HYDROCHLORIDE 0.1 MG: 0.1 TABLET ORAL at 08:15

## 2022-04-26 RX ADMIN — DOCUSATE SODIUM 50 MG AND SENNOSIDES 8.6 MG 1 TABLET: 8.6; 5 TABLET, FILM COATED ORAL at 22:22

## 2022-04-26 RX ADMIN — DOCUSATE SODIUM 50 MG AND SENNOSIDES 8.6 MG 1 TABLET: 8.6; 5 TABLET, FILM COATED ORAL at 08:16

## 2022-04-26 RX ADMIN — GABAPENTIN 600 MG: 600 TABLET ORAL at 13:56

## 2022-04-26 RX ADMIN — PROPRANOLOL HYDROCHLORIDE 40 MG: 40 TABLET ORAL at 23:26

## 2022-04-26 RX ADMIN — HALOPERIDOL LACTATE 5 MG: 5 INJECTION, SOLUTION INTRAMUSCULAR at 23:26

## 2022-04-26 RX ADMIN — DIAZEPAM 10 MG: 5 TABLET ORAL at 22:29

## 2022-04-26 RX ADMIN — DEXMEDETOMIDINE HYDROCHLORIDE 0.4 MCG/KG/HR: 4 INJECTION, SOLUTION INTRAVENOUS at 02:03

## 2022-04-26 RX ADMIN — GABAPENTIN 600 MG: 600 TABLET ORAL at 23:29

## 2022-04-26 RX ADMIN — FENTANYL CITRATE 50 MCG: 50 INJECTION INTRAMUSCULAR; INTRAVENOUS at 02:59

## 2022-04-26 ASSESSMENT — PULMONARY FUNCTION TESTS
PIF_VALUE: 16
PIF_VALUE: 17
PIF_VALUE: 16
PIF_VALUE: 17
PIF_VALUE: 16
PIF_VALUE: 17
PIF_VALUE: 16
PIF_VALUE: 17

## 2022-04-26 NOTE — CARE COORDINATION
Received message from pt's mother requesting update on Alen Watters. Placed call to Odinshanta Smith at [phone], spoke with Madonna Darling in admissions, and per Kamron Ayala is working on referral, he provided Merlene's number, [phone]. Writer placed call to David Cardona, left message asking for update, call back requested. [address] call to pt's mother to discuss that we are awaiting a decision from [facility], line was busy, will try back later. 1041 - Received message from David Cardona at [facility], called back, and per David Cardona, she is having referral reviewed, need auth from [facility] for Medicaid pts, and per David Cardona, she will call writer back with a decision on acceptance. 1230 - Received call from David Cardona at [facility], she requested pt's medication list, list faxed to David Cardona at [phone].    [phone] - Placed call to David Cardona at [facility], she stated that they cannot accept at this time as medication costs are too high; however, when pt is off precedex and/or antibiotics, send a new medication list and she will re-evaluate. They will follow (need to send updates as she does not have Epic access). [phone] - Placed call to Lester Navarro, pt's mother, to update, line busy.

## 2022-04-26 NOTE — PLAN OF CARE
Problem: OXYGENATION/RESPIRATORY FUNCTION  Goal: Patient will maintain patent airway  4/25/2022 0938 by Elvira Pump, RCP  Outcome: Progressing  Goal: Patient will achieve/maintain normal respiratory rate/effort  Description: Respiratory rate and effort will be within normal limits for the patient  4/25/2022 7166 by Elvira Pump, RCP  Outcome: Progressing     Problem: SKIN INTEGRITY  Goal: Skin integrity is maintained or improved  Outcome: Progressing     Problem: MECHANICAL VENTILATION  Goal: Patient will maintain patent airway  4/25/2022 0938 by Elvira Pump, RCP  Outcome: Progressing  Goal: Oral health is maintained or improved  4/25/2022 0938 by Elvira Pump, RCP  Outcome: Progressing  Goal: ET tube will be managed safely  4/25/2022 0938 by Elvira Pump, RCP  Outcome: Progressing  Goal: Ability to express needs and understand communication  4/25/2022 0938 by Elvira Pump, RCP  Outcome: Progressing  Goal: Mobility/activity is maintained at optimum level for patient  4/25/2022 8181 by Elvira Pump, RCP  Outcome: Progressing     Problem: Non-Violent Restraints  Goal: Removal from restraints as soon as assessed to be safe  Outcome: Not Progressing  Goal: No harm/injury to patient while restraints in use  Outcome: Progressing  Goal: Patient's dignity will be maintained  Outcome: Progressing     Problem: Skin Integrity:  Goal: Will show no infection signs and symptoms  Description: Will show no infection signs and symptoms  Outcome: Progressing  Goal: Absence of new skin breakdown  Description: Absence of new skin breakdown  Outcome: Progressing     Problem: Falls - Risk of:  Goal: Will remain free from falls  Description: Will remain free from falls  Outcome: Progressing  Goal: Absence of physical injury  Description: Absence of physical injury  Outcome: Progressing   ROund on patient hourly. Reposition patient every two hours and maintain clean and dry skin.   Gela Armas RN

## 2022-04-26 NOTE — PROGRESS NOTES
Discontinued Sitter per patient adequately sedated with Precedex, appears to be resting comfortably, family at bedside.
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
## 2022-04-26 NOTE — PLAN OF CARE
Problem: Non-Violent Restraints  Goal: Removal from restraints as soon as assessed to be safe  4/26/2022 0047 by Gunnar Banerjee RN  Outcome: Progressing  4/25/2022 2056 by Douglas Corbin RN  Outcome: Not Progressing     Problem: Non-Violent Restraints  Goal: No harm/injury to patient while restraints in use  4/26/2022 0047 by Gunnar Banerjee RN  Outcome: Progressing  4/25/2022 2056 by Douglas Corbin RN  Outcome: Progressing     Problem: Non-Violent Restraints  Goal: Patient's dignity will be maintained  4/26/2022 0047 by Gunnar Banerjee RN  Outcome: Progressing  4/25/2022 2056 by Douglas Corbin RN  Outcome: Progressing

## 2022-04-27 LAB
ANION GAP SERPL CALCULATED.3IONS-SCNC: 11 MMOL/L (ref 9–17)
BUN BLDV-MCNC: 29 MG/DL (ref 6–20)
CALCIUM SERPL-MCNC: 9.5 MG/DL (ref 8.6–10.4)
CHLORIDE BLD-SCNC: 107 MMOL/L (ref 98–107)
CO2: 24 MMOL/L (ref 20–31)
CREAT SERPL-MCNC: 0.75 MG/DL (ref 0.7–1.2)
GFR AFRICAN AMERICAN: >60 ML/MIN
GFR NON-AFRICAN AMERICAN: >60 ML/MIN
GFR SERPL CREATININE-BSD FRML MDRD: ABNORMAL ML/MIN/{1.73_M2}
GLUCOSE BLD-MCNC: 112 MG/DL (ref 70–99)
HCT VFR BLD CALC: 28.7 % (ref 40.7–50.3)
HEMOGLOBIN: 9.9 G/DL (ref 13–17)
MAGNESIUM: 2.1 MG/DL (ref 1.6–2.6)
MCH RBC QN AUTO: 32.8 PG (ref 25.2–33.5)
MCHC RBC AUTO-ENTMCNC: 34.5 G/DL (ref 28.4–34.8)
MCV RBC AUTO: 95 FL (ref 82.6–102.9)
NRBC AUTOMATED: 0 PER 100 WBC
PDW BLD-RTO: 11.9 % (ref 11.8–14.4)
PHOSPHORUS: 4.4 MG/DL (ref 2.5–4.5)
PLATELET # BLD: 387 K/UL (ref 138–453)
PMV BLD AUTO: 9.4 FL (ref 8.1–13.5)
POTASSIUM SERPL-SCNC: 4.7 MMOL/L (ref 3.7–5.3)
RBC # BLD: 3.02 M/UL (ref 4.21–5.77)
SODIUM BLD-SCNC: 142 MMOL/L (ref 135–144)
WBC # BLD: 7.4 K/UL (ref 3.5–11.3)

## 2022-04-27 PROCEDURE — 2700000000 HC OXYGEN THERAPY PER DAY

## 2022-04-27 PROCEDURE — 94761 N-INVAS EAR/PLS OXIMETRY MLT: CPT

## 2022-04-27 PROCEDURE — 84100 ASSAY OF PHOSPHORUS: CPT

## 2022-04-27 PROCEDURE — 97530 THERAPEUTIC ACTIVITIES: CPT

## 2022-04-27 PROCEDURE — 6370000000 HC RX 637 (ALT 250 FOR IP): Performed by: STUDENT IN AN ORGANIZED HEALTH CARE EDUCATION/TRAINING PROGRAM

## 2022-04-27 PROCEDURE — 93005 ELECTROCARDIOGRAM TRACING: CPT | Performed by: SURGERY

## 2022-04-27 PROCEDURE — 80048 BASIC METABOLIC PNL TOTAL CA: CPT

## 2022-04-27 PROCEDURE — 6360000002 HC RX W HCPCS: Performed by: EMERGENCY MEDICINE

## 2022-04-27 PROCEDURE — 36415 COLL VENOUS BLD VENIPUNCTURE: CPT

## 2022-04-27 PROCEDURE — 83735 ASSAY OF MAGNESIUM: CPT

## 2022-04-27 PROCEDURE — 2000000000 HC ICU R&B

## 2022-04-27 PROCEDURE — 6370000000 HC RX 637 (ALT 250 FOR IP): Performed by: NURSE PRACTITIONER

## 2022-04-27 PROCEDURE — 2500000003 HC RX 250 WO HCPCS: Performed by: STUDENT IN AN ORGANIZED HEALTH CARE EDUCATION/TRAINING PROGRAM

## 2022-04-27 PROCEDURE — 2580000003 HC RX 258: Performed by: STUDENT IN AN ORGANIZED HEALTH CARE EDUCATION/TRAINING PROGRAM

## 2022-04-27 PROCEDURE — 6370000000 HC RX 637 (ALT 250 FOR IP): Performed by: EMERGENCY MEDICINE

## 2022-04-27 PROCEDURE — 97110 THERAPEUTIC EXERCISES: CPT

## 2022-04-27 PROCEDURE — 6370000000 HC RX 637 (ALT 250 FOR IP): Performed by: SURGERY

## 2022-04-27 PROCEDURE — 6360000002 HC RX W HCPCS: Performed by: STUDENT IN AN ORGANIZED HEALTH CARE EDUCATION/TRAINING PROGRAM

## 2022-04-27 PROCEDURE — 85027 COMPLETE CBC AUTOMATED: CPT

## 2022-04-27 PROCEDURE — 94003 VENT MGMT INPAT SUBQ DAY: CPT

## 2022-04-27 PROCEDURE — 2580000003 HC RX 258: Performed by: EMERGENCY MEDICINE

## 2022-04-27 RX ORDER — GINSENG 100 MG
CAPSULE ORAL DAILY PRN
Status: DISCONTINUED | OUTPATIENT
Start: 2022-04-27 | End: 2022-05-12 | Stop reason: HOSPADM

## 2022-04-27 RX ORDER — SENNA AND DOCUSATE SODIUM 50; 8.6 MG/1; MG/1
1 TABLET, FILM COATED ORAL 2 TIMES DAILY
Qty: 60 TABLET | Refills: 0
Start: 2022-04-27 | End: 2022-05-27

## 2022-04-27 RX ORDER — OLANZAPINE 5 MG/1
5 TABLET ORAL ONCE
Status: COMPLETED | OUTPATIENT
Start: 2022-04-27 | End: 2022-04-27

## 2022-04-27 RX ORDER — ACETAMINOPHEN 500 MG
1000 TABLET ORAL EVERY 8 HOURS SCHEDULED
Qty: 42 TABLET | Refills: 0
Start: 2022-04-27 | End: 2022-05-04

## 2022-04-27 RX ORDER — FENTANYL CITRATE 50 UG/ML
50 INJECTION, SOLUTION INTRAMUSCULAR; INTRAVENOUS ONCE
Status: DISCONTINUED | OUTPATIENT
Start: 2022-04-27 | End: 2022-04-27

## 2022-04-27 RX ORDER — GABAPENTIN 600 MG/1
600 TABLET ORAL EVERY 8 HOURS
Qty: 21 TABLET | Refills: 0
Start: 2022-04-27 | End: 2022-05-04

## 2022-04-27 RX ORDER — BISACODYL 10 MG
10 SUPPOSITORY, RECTAL RECTAL DAILY
Qty: 30 SUPPOSITORY | Refills: 0
Start: 2022-04-28 | End: 2022-05-28

## 2022-04-27 RX ORDER — FAMOTIDINE 20 MG/1
20 TABLET, FILM COATED ORAL 2 TIMES DAILY
Qty: 60 TABLET | Refills: 0
Start: 2022-04-27

## 2022-04-27 RX ORDER — ERYTHROMYCIN 5 MG/G
OINTMENT OPHTHALMIC
Qty: 1 EACH | Refills: 0
Start: 2022-04-27 | End: 2022-05-10 | Stop reason: HOSPADM

## 2022-04-27 RX ORDER — PROPRANOLOL HYDROCHLORIDE 20 MG/1
20 TABLET ORAL EVERY 8 HOURS
Status: DISCONTINUED | OUTPATIENT
Start: 2022-04-27 | End: 2022-04-28

## 2022-04-27 RX ORDER — FOLIC ACID 1 MG/1
1 TABLET ORAL DAILY
Qty: 30 TABLET | Refills: 0
Start: 2022-04-28

## 2022-04-27 RX ORDER — CLONIDINE HYDROCHLORIDE 0.1 MG/1
0.1 TABLET ORAL EVERY 8 HOURS
Status: DISCONTINUED | OUTPATIENT
Start: 2022-04-27 | End: 2022-04-28

## 2022-04-27 RX ORDER — CLONIDINE HYDROCHLORIDE 0.1 MG/1
0.1 TABLET ORAL EVERY 8 HOURS
Qty: 60 TABLET | Refills: 0
Start: 2022-04-27

## 2022-04-27 RX ORDER — ENOXAPARIN SODIUM 100 MG/ML
30 INJECTION SUBCUTANEOUS 2 TIMES DAILY
Qty: 18 ML | Refills: 0
Start: 2022-04-27 | End: 2022-05-27

## 2022-04-27 RX ORDER — QUETIAPINE FUMARATE 100 MG/1
100 TABLET, FILM COATED ORAL EVERY 8 HOURS
Status: DISCONTINUED | OUTPATIENT
Start: 2022-04-27 | End: 2022-04-30

## 2022-04-27 RX ORDER — GABAPENTIN 300 MG/1
300 CAPSULE ORAL 3 TIMES DAILY
Qty: 21 CAPSULE | Refills: 0
Start: 2022-05-05 | End: 2022-05-12

## 2022-04-27 RX ORDER — POLYETHYLENE GLYCOL 3350 17 G/17G
17 POWDER, FOR SOLUTION ORAL DAILY
Qty: 30 EACH | Refills: 0
Start: 2022-04-28 | End: 2022-05-28

## 2022-04-27 RX ORDER — OLANZAPINE 5 MG/1
5 TABLET ORAL NIGHTLY
Status: DISCONTINUED | OUTPATIENT
Start: 2022-04-27 | End: 2022-05-10

## 2022-04-27 RX ADMIN — DEXMEDETOMIDINE HYDROCHLORIDE 1.1 MCG/KG/HR: 4 INJECTION, SOLUTION INTRAVENOUS at 08:36

## 2022-04-27 RX ADMIN — DEXMEDETOMIDINE HYDROCHLORIDE 1.5 MCG/KG/HR: 4 INJECTION, SOLUTION INTRAVENOUS at 23:24

## 2022-04-27 RX ADMIN — DOCUSATE SODIUM 50 MG AND SENNOSIDES 8.6 MG 1 TABLET: 8.6; 5 TABLET, FILM COATED ORAL at 09:13

## 2022-04-27 RX ADMIN — FAMOTIDINE 20 MG: 20 TABLET, FILM COATED ORAL at 21:15

## 2022-04-27 RX ADMIN — OXYCODONE HYDROCHLORIDE 10 MG: 5 TABLET ORAL at 06:45

## 2022-04-27 RX ADMIN — FENTANYL CITRATE 50 MCG: 50 INJECTION INTRAMUSCULAR; INTRAVENOUS at 07:51

## 2022-04-27 RX ADMIN — SODIUM CHLORIDE, PRESERVATIVE FREE 10 ML: 5 INJECTION INTRAVENOUS at 07:51

## 2022-04-27 RX ADMIN — DIAZEPAM 10 MG: 5 TABLET ORAL at 16:44

## 2022-04-27 RX ADMIN — FAMOTIDINE 20 MG: 20 TABLET, FILM COATED ORAL at 09:13

## 2022-04-27 RX ADMIN — OLANZAPINE 5 MG: 5 TABLET, FILM COATED ORAL at 03:12

## 2022-04-27 RX ADMIN — ENOXAPARIN SODIUM 30 MG: 100 INJECTION SUBCUTANEOUS at 21:15

## 2022-04-27 RX ADMIN — IBUPROFEN 400 MG: 200 SUSPENSION ORAL at 08:39

## 2022-04-27 RX ADMIN — FOLIC ACID 1 MG: 1 TABLET ORAL at 09:13

## 2022-04-27 RX ADMIN — DEXMEDETOMIDINE HYDROCHLORIDE 1.5 MCG/KG/HR: 4 INJECTION, SOLUTION INTRAVENOUS at 01:30

## 2022-04-27 RX ADMIN — ACETAMINOPHEN 1000 MG: 500 TABLET ORAL at 06:45

## 2022-04-27 RX ADMIN — DIAZEPAM 10 MG: 5 TABLET ORAL at 10:00

## 2022-04-27 RX ADMIN — DEXMEDETOMIDINE HYDROCHLORIDE 1.3 MCG/KG/HR: 4 INJECTION, SOLUTION INTRAVENOUS at 04:19

## 2022-04-27 RX ADMIN — POLYETHYLENE GLYCOL 3350 17 G: 17 POWDER, FOR SOLUTION ORAL at 08:40

## 2022-04-27 RX ADMIN — DEXMEDETOMIDINE HYDROCHLORIDE 0.9 MCG/KG/HR: 4 INJECTION, SOLUTION INTRAVENOUS at 16:45

## 2022-04-27 RX ADMIN — FENTANYL CITRATE 50 MCG: 50 INJECTION INTRAMUSCULAR; INTRAVENOUS at 17:07

## 2022-04-27 RX ADMIN — OLANZAPINE 5 MG: 5 TABLET, FILM COATED ORAL at 21:15

## 2022-04-27 RX ADMIN — OXYCODONE HYDROCHLORIDE 10 MG: 5 TABLET ORAL at 17:46

## 2022-04-27 RX ADMIN — GABAPENTIN 600 MG: 600 TABLET ORAL at 13:59

## 2022-04-27 RX ADMIN — ENOXAPARIN SODIUM 30 MG: 100 INJECTION SUBCUTANEOUS at 09:13

## 2022-04-27 RX ADMIN — GABAPENTIN 600 MG: 600 TABLET ORAL at 21:15

## 2022-04-27 RX ADMIN — Medication 10 MG: at 08:40

## 2022-04-27 RX ADMIN — DEXMEDETOMIDINE HYDROCHLORIDE 0.9 MCG/KG/HR: 4 INJECTION, SOLUTION INTRAVENOUS at 11:25

## 2022-04-27 RX ADMIN — PIPERACILLIN AND TAZOBACTAM 3375 MG: 3; .375 INJECTION, POWDER, LYOPHILIZED, FOR SOLUTION INTRAVENOUS at 07:41

## 2022-04-27 RX ADMIN — ACETAMINOPHEN 1000 MG: 500 TABLET ORAL at 21:15

## 2022-04-27 RX ADMIN — ACETAMINOPHEN 1000 MG: 500 TABLET ORAL at 13:20

## 2022-04-27 RX ADMIN — ERYTHROMYCIN: 5 OINTMENT OPHTHALMIC at 21:15

## 2022-04-27 RX ADMIN — SODIUM CHLORIDE, PRESERVATIVE FREE 10 ML: 5 INJECTION INTRAVENOUS at 21:15

## 2022-04-27 RX ADMIN — PIPERACILLIN AND TAZOBACTAM 3375 MG: 3; .375 INJECTION, POWDER, LYOPHILIZED, FOR SOLUTION INTRAVENOUS at 15:04

## 2022-04-27 RX ADMIN — GABAPENTIN 600 MG: 600 TABLET ORAL at 05:01

## 2022-04-27 RX ADMIN — CLONIDINE HYDROCHLORIDE 0.1 MG: 0.1 TABLET ORAL at 17:18

## 2022-04-27 RX ADMIN — DIAZEPAM 10 MG: 5 TABLET ORAL at 05:04

## 2022-04-27 RX ADMIN — FENTANYL CITRATE 50 MCG: 50 INJECTION INTRAMUSCULAR; INTRAVENOUS at 20:54

## 2022-04-27 RX ADMIN — QUETIAPINE FUMARATE 100 MG: 100 TABLET ORAL at 17:46

## 2022-04-27 RX ADMIN — IBUPROFEN 400 MG: 200 SUSPENSION ORAL at 05:04

## 2022-04-27 RX ADMIN — OXYCODONE HYDROCHLORIDE 10 MG: 5 TABLET ORAL at 12:05

## 2022-04-27 RX ADMIN — DEXMEDETOMIDINE HYDROCHLORIDE 1.3 MCG/KG/HR: 4 INJECTION, SOLUTION INTRAVENOUS at 20:00

## 2022-04-27 RX ADMIN — QUETIAPINE FUMARATE 150 MG: 100 TABLET ORAL at 03:12

## 2022-04-27 RX ADMIN — BACITRACIN: 500 OINTMENT TOPICAL at 10:01

## 2022-04-27 RX ADMIN — FENTANYL CITRATE 50 MCG: 50 INJECTION INTRAMUSCULAR; INTRAVENOUS at 13:51

## 2022-04-27 RX ADMIN — QUETIAPINE FUMARATE 150 MG: 100 TABLET ORAL at 10:01

## 2022-04-27 RX ADMIN — CLONIDINE HYDROCHLORIDE 0.1 MG: 0.1 TABLET ORAL at 09:13

## 2022-04-27 RX ADMIN — FENTANYL CITRATE 50 MCG: 50 INJECTION INTRAMUSCULAR; INTRAVENOUS at 22:46

## 2022-04-27 ASSESSMENT — PULMONARY FUNCTION TESTS
PIF_VALUE: 16
PIF_VALUE: 11
PIF_VALUE: 16
PIF_VALUE: 16
PIF_VALUE: 11

## 2022-04-27 NOTE — PLAN OF CARE
Problem: OXYGENATION/RESPIRATORY FUNCTION  Goal: Patient will maintain patent airway  4/27/2022 1410 by Karlee Lynch RN  Outcome: Progressing  4/27/2022 0608 by Anali Hammond RN  Outcome: Progressing  Goal: Patient will achieve/maintain normal respiratory rate/effort  Description: Respiratory rate and effort will be within normal limits for the patient  4/27/2022 1410 by Karlee Lynch RN  Outcome: Progressing  4/27/2022 0608 by Anali Hammond RN  Outcome: Progressing     Problem: SKIN INTEGRITY  Goal: Skin integrity is maintained or improved  4/27/2022 1410 by Karlee Lynch RN  Outcome: Progressing  4/27/2022 0608 by Anali Hammond RN  Outcome: Progressing     Problem: Non-Violent Restraints  Goal: Removal from restraints as soon as assessed to be safe  4/27/2022 1410 by Karlee Lynch RN  Outcome: Progressing  4/27/2022 0608 by Anali Hammond RN  Outcome: Progressing  Goal: No harm/injury to patient while restraints in use  4/27/2022 1410 by Karlee Lynch RN  Outcome: Progressing  4/27/2022 0608 by Anali Hammond RN  Outcome: Progressing  Goal: Patient's dignity will be maintained  4/27/2022 1410 by Karlee Lynch RN  Outcome: Progressing  4/27/2022 0608 by Anali Hammond RN  Outcome: Progressing     Problem: Skin Integrity:  Goal: Will show no infection signs and symptoms  Description: Will show no infection signs and symptoms  4/27/2022 1410 by Karlee Lynch RN  Outcome: Progressing  4/27/2022 0608 by Anali Hammond RN  Outcome: Progressing  Goal: Absence of new skin breakdown  Description: Absence of new skin breakdown  4/27/2022 1410 by Karlee Lynch RN  Outcome: Progressing  4/27/2022 0608 by Anali Hammond RN  Outcome: Progressing     Problem: Falls - Risk of:  Goal: Will remain free from falls  Description: Will remain free from falls  4/27/2022 1410 by Karlee Lynch RN  Outcome: Progressing  4/27/2022 0608 by Anali Hammond RN  Outcome: Progressing  Goal: Absence of physical injury  Description: Absence of physical injury  4/27/2022 1410 by Charity Sawyer RN  Outcome: Progressing  4/27/2022 0608 by Saleem Wise RN  Outcome: Progressing     Problem: Nutrition  Goal: Optimal nutrition therapy  4/27/2022 1410 by Charity Sawyer RN  Outcome: Progressing  4/27/2022 0608 by Saleem Wise RN  Outcome: Progressing     Problem: MECHANICAL VENTILATION  Goal: Patient will maintain patent airway  4/27/2022 1410 by Charity Sawyer RN  Outcome: Progressing  4/27/2022 0608 by Saleem Wise RN  Outcome: Progressing  Goal: Oral health is maintained or improved  4/27/2022 1410 by Charity Sawyer RN  Outcome: Progressing  4/27/2022 0608 by Saleem Wise RN  Outcome: Progressing  Goal: ET tube will be managed safely  4/27/2022 1410 by Charity Sawyer RN  Outcome: Progressing  4/27/2022 0608 by Saleem Wise RN  Outcome: Progressing  Goal: Ability to express needs and understand communication  4/27/2022 1410 by Charity Sawyer RN  Outcome: Progressing  4/27/2022 0608 by Saleem Wise RN  Outcome: Progressing  Goal: Mobility/activity is maintained at optimum level for patient  4/27/2022 1410 by Charity Sawyer RN  Outcome: Progressing  4/27/2022 0608 by Saleem Wise RN  Outcome: Progressing     Problem: Discharge Planning  Goal: Discharge to home or other facility with appropriate resources  4/27/2022 1410 by Charity Sawyer RN  Outcome: Progressing  4/27/2022 0608 by Saleem Wise RN  Outcome: Progressing     Problem: Safety - Medical Restraint  Goal: Remains free of injury from restraints (Restraint for Interference with Medical Device)  Description: INTERVENTIONS:  1. Determine that other, less restrictive measures have been tried or would not be effective before applying the restraint  2. Evaluate the patient's condition at the time of restraint application  3. Inform patient/family regarding the reason for restraint  4.  Q2H: Monitor safety, psychosocial status, comfort, nutrition and hydration  4/27/2022 1410 by Jean Pierre Sanders RN  Outcome: Progressing  4/27/2022 0608 by Keri Mccormick RN  Outcome: Progressing     Problem: Pain  Goal: Verbalizes/displays adequate comfort level or baseline comfort level  4/27/2022 1410 by Jean Pierre Sanders RN  Outcome: Progressing  4/27/2022 0608 by Keri Mccormick RN  Outcome: Progressing
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
## 2022-04-28 LAB
EKG ATRIAL RATE: 50 BPM
EKG P AXIS: 47 DEGREES
EKG P-R INTERVAL: 176 MS
EKG Q-T INTERVAL: 456 MS
EKG QRS DURATION: 110 MS
EKG QTC CALCULATION (BAZETT): 415 MS
EKG R AXIS: 54 DEGREES
EKG T AXIS: 55 DEGREES
EKG VENTRICULAR RATE: 50 BPM
FIO2: 30
GLUCOSE BLD-MCNC: 78 MG/DL (ref 74–100)
MODE: ABNORMAL
O2 DEVICE/FLOW/%: ABNORMAL
POC HCO3: 29 MMOL/L (ref 21–28)
POC LACTIC ACID: 0.6 MMOL/L (ref 0.56–1.39)
POC O2 SATURATION: 95 % (ref 94–98)
POC PCO2: 45.3 MM HG (ref 35–48)
POC PH: 7.41 (ref 7.35–7.45)
POC PO2: 76.3 MM HG (ref 83–108)
POSITIVE BASE EXCESS, ART: 4 (ref 0–3)
SAMPLE SITE: ABNORMAL

## 2022-04-28 PROCEDURE — 2580000003 HC RX 258: Performed by: EMERGENCY MEDICINE

## 2022-04-28 PROCEDURE — 93005 ELECTROCARDIOGRAM TRACING: CPT | Performed by: SURGERY

## 2022-04-28 PROCEDURE — 6360000002 HC RX W HCPCS: Performed by: NURSE PRACTITIONER

## 2022-04-28 PROCEDURE — 82947 ASSAY GLUCOSE BLOOD QUANT: CPT

## 2022-04-28 PROCEDURE — 51798 US URINE CAPACITY MEASURE: CPT

## 2022-04-28 PROCEDURE — 83605 ASSAY OF LACTIC ACID: CPT

## 2022-04-28 PROCEDURE — 2580000003 HC RX 258: Performed by: NURSE PRACTITIONER

## 2022-04-28 PROCEDURE — 94003 VENT MGMT INPAT SUBQ DAY: CPT

## 2022-04-28 PROCEDURE — 36600 WITHDRAWAL OF ARTERIAL BLOOD: CPT

## 2022-04-28 PROCEDURE — 2000000000 HC ICU R&B

## 2022-04-28 PROCEDURE — 94761 N-INVAS EAR/PLS OXIMETRY MLT: CPT

## 2022-04-28 PROCEDURE — 93010 ELECTROCARDIOGRAM REPORT: CPT | Performed by: INTERNAL MEDICINE

## 2022-04-28 PROCEDURE — 82803 BLOOD GASES ANY COMBINATION: CPT

## 2022-04-28 PROCEDURE — 6360000002 HC RX W HCPCS: Performed by: EMERGENCY MEDICINE

## 2022-04-28 PROCEDURE — 51702 INSERT TEMP BLADDER CATH: CPT

## 2022-04-28 PROCEDURE — 6360000002 HC RX W HCPCS: Performed by: STUDENT IN AN ORGANIZED HEALTH CARE EDUCATION/TRAINING PROGRAM

## 2022-04-28 PROCEDURE — 2500000003 HC RX 250 WO HCPCS: Performed by: STUDENT IN AN ORGANIZED HEALTH CARE EDUCATION/TRAINING PROGRAM

## 2022-04-28 PROCEDURE — 6370000000 HC RX 637 (ALT 250 FOR IP): Performed by: EMERGENCY MEDICINE

## 2022-04-28 PROCEDURE — 6370000000 HC RX 637 (ALT 250 FOR IP): Performed by: NURSE PRACTITIONER

## 2022-04-28 PROCEDURE — 2580000003 HC RX 258: Performed by: STUDENT IN AN ORGANIZED HEALTH CARE EDUCATION/TRAINING PROGRAM

## 2022-04-28 PROCEDURE — 6370000000 HC RX 637 (ALT 250 FOR IP): Performed by: STUDENT IN AN ORGANIZED HEALTH CARE EDUCATION/TRAINING PROGRAM

## 2022-04-28 PROCEDURE — 6370000000 HC RX 637 (ALT 250 FOR IP): Performed by: SURGERY

## 2022-04-28 PROCEDURE — 2700000000 HC OXYGEN THERAPY PER DAY

## 2022-04-28 RX ORDER — HALOPERIDOL 5 MG/ML
5 INJECTION INTRAMUSCULAR ONCE
Status: COMPLETED | OUTPATIENT
Start: 2022-04-28 | End: 2022-04-28

## 2022-04-28 RX ORDER — CLONIDINE HYDROCHLORIDE 0.2 MG/1
0.2 TABLET ORAL EVERY 8 HOURS SCHEDULED
Status: DISCONTINUED | OUTPATIENT
Start: 2022-04-28 | End: 2022-05-04

## 2022-04-28 RX ORDER — DIAZEPAM 5 MG/1
10 TABLET ORAL EVERY 6 HOURS SCHEDULED
Status: DISCONTINUED | OUTPATIENT
Start: 2022-04-28 | End: 2022-05-09

## 2022-04-28 RX ADMIN — OLANZAPINE 5 MG: 5 TABLET, FILM COATED ORAL at 21:03

## 2022-04-28 RX ADMIN — SODIUM CHLORIDE, PRESERVATIVE FREE 10 ML: 5 INJECTION INTRAVENOUS at 21:03

## 2022-04-28 RX ADMIN — CLONIDINE HYDROCHLORIDE 0.2 MG: 0.2 TABLET ORAL at 13:44

## 2022-04-28 RX ADMIN — QUETIAPINE FUMARATE 100 MG: 100 TABLET ORAL at 10:34

## 2022-04-28 RX ADMIN — OXYCODONE HYDROCHLORIDE 10 MG: 5 TABLET ORAL at 00:16

## 2022-04-28 RX ADMIN — FENTANYL CITRATE 50 MCG: 50 INJECTION INTRAMUSCULAR; INTRAVENOUS at 12:37

## 2022-04-28 RX ADMIN — POLYETHYLENE GLYCOL 3350 17 G: 17 POWDER, FOR SOLUTION ORAL at 08:57

## 2022-04-28 RX ADMIN — DEXMEDETOMIDINE HYDROCHLORIDE 1.5 MCG/KG/HR: 4 INJECTION, SOLUTION INTRAVENOUS at 07:01

## 2022-04-28 RX ADMIN — DEXMEDETOMIDINE HYDROCHLORIDE 1.5 MCG/KG/HR: 4 INJECTION, SOLUTION INTRAVENOUS at 12:10

## 2022-04-28 RX ADMIN — OXYCODONE HYDROCHLORIDE 10 MG: 5 TABLET ORAL at 18:03

## 2022-04-28 RX ADMIN — DEXMEDETOMIDINE HYDROCHLORIDE 1.5 MCG/KG/HR: 4 INJECTION, SOLUTION INTRAVENOUS at 17:29

## 2022-04-28 RX ADMIN — DEXMEDETOMIDINE HYDROCHLORIDE 1.5 MCG/KG/HR: 4 INJECTION, SOLUTION INTRAVENOUS at 04:20

## 2022-04-28 RX ADMIN — ACETAMINOPHEN 1000 MG: 500 TABLET ORAL at 13:39

## 2022-04-28 RX ADMIN — HALOPERIDOL LACTATE 5 MG: 5 INJECTION, SOLUTION INTRAMUSCULAR at 12:42

## 2022-04-28 RX ADMIN — ACETAMINOPHEN 1000 MG: 500 TABLET ORAL at 21:09

## 2022-04-28 RX ADMIN — DOCUSATE SODIUM 50 MG AND SENNOSIDES 8.6 MG 1 TABLET: 8.6; 5 TABLET, FILM COATED ORAL at 21:03

## 2022-04-28 RX ADMIN — CLONIDINE HYDROCHLORIDE 0.1 MG: 0.1 TABLET ORAL at 02:38

## 2022-04-28 RX ADMIN — FAMOTIDINE 20 MG: 20 TABLET, FILM COATED ORAL at 08:56

## 2022-04-28 RX ADMIN — DEXMEDETOMIDINE HYDROCHLORIDE 1.5 MCG/KG/HR: 4 INJECTION, SOLUTION INTRAVENOUS at 14:48

## 2022-04-28 RX ADMIN — PIPERACILLIN AND TAZOBACTAM 3375 MG: 3; .375 INJECTION, POWDER, LYOPHILIZED, FOR SOLUTION INTRAVENOUS at 08:52

## 2022-04-28 RX ADMIN — ENOXAPARIN SODIUM 30 MG: 100 INJECTION SUBCUTANEOUS at 21:04

## 2022-04-28 RX ADMIN — FENTANYL CITRATE 50 MCG: 50 INJECTION INTRAMUSCULAR; INTRAVENOUS at 02:38

## 2022-04-28 RX ADMIN — DEXMEDETOMIDINE HYDROCHLORIDE 1.5 MCG/KG/HR: 4 INJECTION, SOLUTION INTRAVENOUS at 02:44

## 2022-04-28 RX ADMIN — DIAZEPAM 10 MG: 5 TABLET ORAL at 12:06

## 2022-04-28 RX ADMIN — GABAPENTIN 600 MG: 600 TABLET ORAL at 06:14

## 2022-04-28 RX ADMIN — QUETIAPINE FUMARATE 100 MG: 100 TABLET ORAL at 02:38

## 2022-04-28 RX ADMIN — GABAPENTIN 600 MG: 600 TABLET ORAL at 21:05

## 2022-04-28 RX ADMIN — ENOXAPARIN SODIUM 30 MG: 100 INJECTION SUBCUTANEOUS at 08:56

## 2022-04-28 RX ADMIN — FENTANYL CITRATE 50 MCG: 50 INJECTION INTRAMUSCULAR; INTRAVENOUS at 04:51

## 2022-04-28 RX ADMIN — ACETAMINOPHEN 1000 MG: 500 TABLET ORAL at 06:13

## 2022-04-28 RX ADMIN — DOCUSATE SODIUM 50 MG AND SENNOSIDES 8.6 MG 1 TABLET: 8.6; 5 TABLET, FILM COATED ORAL at 08:57

## 2022-04-28 RX ADMIN — DEXMEDETOMIDINE HYDROCHLORIDE 1.5 MCG/KG/HR: 4 INJECTION, SOLUTION INTRAVENOUS at 22:34

## 2022-04-28 RX ADMIN — QUETIAPINE FUMARATE 100 MG: 100 TABLET ORAL at 18:03

## 2022-04-28 RX ADMIN — FAMOTIDINE 20 MG: 20 TABLET, FILM COATED ORAL at 21:04

## 2022-04-28 RX ADMIN — ERYTHROMYCIN: 5 OINTMENT OPHTHALMIC at 21:05

## 2022-04-28 RX ADMIN — DEXMEDETOMIDINE HYDROCHLORIDE 1.5 MCG/KG/HR: 4 INJECTION, SOLUTION INTRAVENOUS at 19:50

## 2022-04-28 RX ADMIN — OXYCODONE HYDROCHLORIDE 10 MG: 5 TABLET ORAL at 12:06

## 2022-04-28 RX ADMIN — CLONIDINE HYDROCHLORIDE 0.2 MG: 0.2 TABLET ORAL at 21:05

## 2022-04-28 RX ADMIN — FOLIC ACID 1 MG: 1 TABLET ORAL at 08:56

## 2022-04-28 RX ADMIN — SODIUM CHLORIDE, PRESERVATIVE FREE 10 ML: 5 INJECTION INTRAVENOUS at 08:52

## 2022-04-28 RX ADMIN — GABAPENTIN 600 MG: 600 TABLET ORAL at 14:23

## 2022-04-28 RX ADMIN — DEXMEDETOMIDINE HYDROCHLORIDE 1.5 MCG/KG/HR: 4 INJECTION, SOLUTION INTRAVENOUS at 09:37

## 2022-04-28 RX ADMIN — Medication 10 MG: at 10:34

## 2022-04-28 RX ADMIN — OXYCODONE HYDROCHLORIDE 10 MG: 5 TABLET ORAL at 06:13

## 2022-04-28 RX ADMIN — PIPERACILLIN AND TAZOBACTAM 3375 MG: 3; .375 INJECTION, POWDER, LYOPHILIZED, FOR SOLUTION INTRAVENOUS at 00:15

## 2022-04-28 RX ADMIN — DIAZEPAM 10 MG: 5 TABLET ORAL at 18:03

## 2022-04-28 RX ADMIN — PIPERACILLIN AND TAZOBACTAM 3375 MG: 3; .375 INJECTION, POWDER, FOR SOLUTION INTRAVENOUS at 18:04

## 2022-04-28 RX ADMIN — CLONIDINE HYDROCHLORIDE 0.1 MG: 0.1 TABLET ORAL at 08:56

## 2022-04-28 ASSESSMENT — PULMONARY FUNCTION TESTS
PIF_VALUE: 10
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
## 2022-04-28 NOTE — PROGRESS NOTES
Occupational [address]  Occupational Therapy Not Seen Note    DATE: 2022    NAME: Cheikh Garcia  MRN: [id number]   : 1995      Patient not seen this date for Occupational Therapy due to:    Sedation:  Pt sedated prior to AARON arrival this PM.     Next Scheduled Treatment: Attempt     Electronically signed by WOLFGANG Gould on 2022 at 1:21 PM

## 2022-04-28 NOTE — CARE COORDINATION
Spoke with Cyndi Davison at [facility], no Medicaid LTACH beds available at [facility] or [facility] . Per Ju Bailon, trauma coordinator, pt's abx may be dc'd tomorrow, Deretha Barthel requested updated medication list when precedex and/or antibiotic was discontinued so that they could re-evaluate pt for acceptance, once medication costs were lower. Per pt's mother, she would like a referral to [address] [place] [address],[address] acute inpatient rehab and also [address] [place] [address] [place] [facility] and [facility] in [place]. Referral faxed to the [facility], [phone]. Placed call to f/u, [phone], referral received and is being reviewed. Placed call to [address], [phone], to obtain fax number, left message, call back requested. Janice 88 call to [facility], discussed possible referral and they stated that when pt stabilizes and can actively participate, he may be appropriate for their facility. [phone] - Updated pt's mother on above facilities, discussed more possible LTACH/ARU choices. Received LTACH choice of [facility], [phone]. Discussed [address] [place] [address], she had previously declined referral there, is agreeable to a referral there at this time. Also received ARU choice of [facility], will send referrals. [address] call to [facility], they have no Medicaid beds available, asked that referral be sent, stated that if a Medicaid bed becomes available, they will contact to notify, provided fax number of [phone]. Referral faxed. [facility] call to [facility] [place],  received fax number, [phone]. Referral faxed. Tahir Garcia 25 Received call from Zack, [phone], at [facility], referral was sent to their physician reviewer. Writer returned call, left message that pt was still on precedex, in order to verify that facility could accept pt on precedex.     [address] call to [facility] Brig[facility] to inquire as to whether pt may be a candidate for acceptance, no answer, will try back. [address] [facility] [address] call to Katelyn Rodriges, no answer; however, from website, neuro rehab is not available at this location.

## 2022-04-28 NOTE — PLAN OF CARE
Problem: OXYGENATION/RESPIRATORY FUNCTION  Goal: Patient will maintain patent airway  4/28/2022 1848 by Ken Faustin RN  Outcome: Progressing  4/28/2022 0639 by Dawood Silva RN  Outcome: Progressing  Goal: Patient will achieve/maintain normal respiratory rate/effort  Description: Respiratory rate and effort will be within normal limits for the patient  4/28/2022 1848 by Ken Faustin RN  Outcome: Progressing  4/28/2022 0639 by Dawood Silva RN  Outcome: Progressing     Problem: SKIN INTEGRITY  Goal: Skin integrity is maintained or improved  4/28/2022 1848 by Ken Faustin RN  Outcome: Progressing  4/28/2022 0639 by Dawood Silva RN  Outcome: Progressing     Problem: MECHANICAL VENTILATION  Goal: Patient will maintain patent airway  4/28/2022 1848 by Ken Faustin RN  Outcome: Progressing  4/28/2022 0639 by Dawood Silva RN  Outcome: Progressing  Goal: Oral health is maintained or improved  4/28/2022 1848 by Ken Faustin RN  Outcome: Progressing  4/28/2022 0639 by Dawood Silva RN  Outcome: Progressing  Goal: ET tube will be managed safely  4/28/2022 1848 by Ken Faustin RN  Outcome: Progressing  4/28/2022 0639 by Dawood Silva RN  Outcome: Progressing  Goal: Ability to express needs and understand communication  4/28/2022 1848 by Ken Faustin RN  Outcome: Progressing  4/28/2022 0639 by Dawood Silva RN  Outcome: Progressing  Goal: Mobility/activity is maintained at optimum level for patient  4/28/2022 1848 by Ken Faustin RN  Outcome: Progressing  4/28/2022 0639 by Dawood Silva RN  Outcome: Progressing     Problem: Non-Violent Restraints  Goal: Removal from restraints as soon as assessed to be safe  4/28/2022 1848 by Ken Faustin RN  Outcome: Progressing  4/28/2022 0639 by Dawood Silva RN  Outcome: Progressing  Goal: No harm/injury to patient while restraints in use  4/28/2022 1848 by Ken Faustin RN  Outcome: Progressing  4/28/2022 0639 by Dawood Silva RN  Outcome: Progressing  Goal: Patient's dignity will be maintained  4/28/2022 1848 by Phong Gorman RN  Outcome: Progressing  4/28/2022 0639 by Ac Fernandes RN  Outcome: Progressing     Problem: Skin Integrity:  Goal: Will show no infection signs and symptoms  Description: Will show no infection signs and symptoms  4/28/2022 1848 by Phong Gorman RN  Outcome: Progressing  4/28/2022 0639 by Ac Fernandes RN  Outcome: Progressing  Goal: Absence of new skin breakdown  Description: Absence of new skin breakdown  4/28/2022 1848 by Phong Gorman RN  Outcome: Progressing  4/28/2022 0639 by Ac Fernandes RN  Outcome: Progressing     Problem: Falls - Risk of:  Goal: Will remain free from falls  Description: Will remain free from falls  4/28/2022 1848 by Phong Gorman RN  Outcome: Progressing  4/28/2022 0639 by Ac Fernandes RN  Outcome: Progressing  Goal: Absence of physical injury  Description: Absence of physical injury  4/28/2022 1848 by Phong Gorman RN  Outcome: Progressing  4/28/2022 0639 by Ac Fernandes RN  Outcome: Progressing     Problem: Discharge Planning  Goal: Discharge to home or other facility with appropriate resources  4/28/2022 1848 by Phong Gorman RN  Outcome: Progressing  4/28/2022 0639 by Ac Fernandes RN  Outcome: Progressing     Problem: Safety - Medical Restraint  Goal: Remains free of injury from restraints (Restraint for Interference with Medical Device)  Description: INTERVENTIONS:  1. Determine that other, less restrictive measures have been tried or would not be effective before applying the restraint  2. Evaluate the patient's condition at the time of restraint application  3. Inform patient/family regarding the reason for restraint  4.  Q2H: Monitor safety, psychosocial status, comfort, nutrition and hydration  4/28/2022 1848 by Phong Gorman RN  Outcome: Progressing  4/28/2022 0639 by Ac Fernandes RN  Outcome: Progressing     Problem: Pain  Goal: Verbalizes/displays adequate comfort level or baseline comfort level  4/28/2022 1848 by Tamanna Waters RN  Outcome: Progressing  4/28/2022 0639 by Jose De Jesus Vyas RN  Outcome: Progressing

## 2022-04-29 LAB
ALLEN TEST: POSITIVE
ANION GAP SERPL CALCULATED.3IONS-SCNC: 9 MMOL/L (ref 9–17)
BUN BLDV-MCNC: 21 MG/DL (ref 6–20)
CALCIUM SERPL-MCNC: 9.4 MG/DL (ref 8.6–10.4)
CHLORIDE BLD-SCNC: 103 MMOL/L (ref 98–107)
CO2: 27 MMOL/L (ref 20–31)
CREAT SERPL-MCNC: 0.72 MG/DL (ref 0.7–1.2)
EKG ATRIAL RATE: 55 BPM
EKG ATRIAL RATE: 56 BPM
EKG P AXIS: 26 DEGREES
EKG P AXIS: 45 DEGREES
EKG P-R INTERVAL: 178 MS
EKG P-R INTERVAL: 178 MS
EKG Q-T INTERVAL: 426 MS
EKG Q-T INTERVAL: 434 MS
EKG QRS DURATION: 110 MS
EKG QRS DURATION: 110 MS
EKG QTC CALCULATION (BAZETT): 407 MS
EKG QTC CALCULATION (BAZETT): 418 MS
EKG R AXIS: 13 DEGREES
EKG R AXIS: 45 DEGREES
EKG T AXIS: 26 DEGREES
EKG T AXIS: 53 DEGREES
EKG VENTRICULAR RATE: 55 BPM
EKG VENTRICULAR RATE: 56 BPM
FIO2: 30
GFR AFRICAN AMERICAN: >60 ML/MIN
GFR NON-AFRICAN AMERICAN: >60 ML/MIN
GFR SERPL CREATININE-BSD FRML MDRD: ABNORMAL ML/MIN/{1.73_M2}
GLUCOSE BLD-MCNC: 103 MG/DL (ref 74–100)
GLUCOSE BLD-MCNC: 127 MG/DL (ref 70–99)
HCT VFR BLD CALC: 31.4 % (ref 40.7–50.3)
HEMOGLOBIN: 10.6 G/DL (ref 13–17)
MCH RBC QN AUTO: 32.2 PG (ref 25.2–33.5)
MCHC RBC AUTO-ENTMCNC: 33.8 G/DL (ref 28.4–34.8)
MCV RBC AUTO: 95.4 FL (ref 82.6–102.9)
MODE: ABNORMAL
NRBC AUTOMATED: 0 PER 100 WBC
O2 DEVICE/FLOW/%: ABNORMAL
PDW BLD-RTO: 11.7 % (ref 11.8–14.4)
PLATELET # BLD: 417 K/UL (ref 138–453)
PMV BLD AUTO: 9.4 FL (ref 8.1–13.5)
POC HCO3: 30.1 MMOL/L (ref 21–28)
POC LACTIC ACID: 0.53 MMOL/L (ref 0.56–1.39)
POC O2 SATURATION: 92 % (ref 94–98)
POC PCO2: 47.9 MM HG (ref 35–48)
POC PH: 7.41 (ref 7.35–7.45)
POC PO2: 65.7 MM HG (ref 83–108)
POSITIVE BASE EXCESS, ART: 4 (ref 0–3)
POTASSIUM SERPL-SCNC: 4.5 MMOL/L (ref 3.7–5.3)
RBC # BLD: 3.29 M/UL (ref 4.21–5.77)
SAMPLE SITE: ABNORMAL
SODIUM BLD-SCNC: 139 MMOL/L (ref 135–144)
WBC # BLD: 6.4 K/UL (ref 3.5–11.3)

## 2022-04-29 PROCEDURE — 93005 ELECTROCARDIOGRAM TRACING: CPT | Performed by: SURGERY

## 2022-04-29 PROCEDURE — 82803 BLOOD GASES ANY COMBINATION: CPT

## 2022-04-29 PROCEDURE — 83605 ASSAY OF LACTIC ACID: CPT

## 2022-04-29 PROCEDURE — 6370000000 HC RX 637 (ALT 250 FOR IP): Performed by: NURSE PRACTITIONER

## 2022-04-29 PROCEDURE — 6370000000 HC RX 637 (ALT 250 FOR IP): Performed by: EMERGENCY MEDICINE

## 2022-04-29 PROCEDURE — 2500000003 HC RX 250 WO HCPCS: Performed by: STUDENT IN AN ORGANIZED HEALTH CARE EDUCATION/TRAINING PROGRAM

## 2022-04-29 PROCEDURE — 82947 ASSAY GLUCOSE BLOOD QUANT: CPT

## 2022-04-29 PROCEDURE — 94761 N-INVAS EAR/PLS OXIMETRY MLT: CPT

## 2022-04-29 PROCEDURE — 94003 VENT MGMT INPAT SUBQ DAY: CPT

## 2022-04-29 PROCEDURE — 6370000000 HC RX 637 (ALT 250 FOR IP): Performed by: STUDENT IN AN ORGANIZED HEALTH CARE EDUCATION/TRAINING PROGRAM

## 2022-04-29 PROCEDURE — 6360000002 HC RX W HCPCS: Performed by: NURSE PRACTITIONER

## 2022-04-29 PROCEDURE — 97110 THERAPEUTIC EXERCISES: CPT

## 2022-04-29 PROCEDURE — 36415 COLL VENOUS BLD VENIPUNCTURE: CPT

## 2022-04-29 PROCEDURE — 93010 ELECTROCARDIOGRAM REPORT: CPT | Performed by: INTERNAL MEDICINE

## 2022-04-29 PROCEDURE — 2580000003 HC RX 258: Performed by: NURSE PRACTITIONER

## 2022-04-29 PROCEDURE — 36600 WITHDRAWAL OF ARTERIAL BLOOD: CPT

## 2022-04-29 PROCEDURE — 85027 COMPLETE CBC AUTOMATED: CPT

## 2022-04-29 PROCEDURE — 6360000002 HC RX W HCPCS: Performed by: STUDENT IN AN ORGANIZED HEALTH CARE EDUCATION/TRAINING PROGRAM

## 2022-04-29 PROCEDURE — 80048 BASIC METABOLIC PNL TOTAL CA: CPT

## 2022-04-29 PROCEDURE — 2000000000 HC ICU R&B

## 2022-04-29 PROCEDURE — 51702 INSERT TEMP BLADDER CATH: CPT

## 2022-04-29 PROCEDURE — 6360000002 HC RX W HCPCS: Performed by: EMERGENCY MEDICINE

## 2022-04-29 PROCEDURE — 6370000000 HC RX 637 (ALT 250 FOR IP): Performed by: SURGERY

## 2022-04-29 PROCEDURE — 2580000003 HC RX 258: Performed by: EMERGENCY MEDICINE

## 2022-04-29 RX ORDER — SODIUM CHLORIDE, SODIUM LACTATE, POTASSIUM CHLORIDE, CALCIUM CHLORIDE 600; 310; 30; 20 MG/100ML; MG/100ML; MG/100ML; MG/100ML
INJECTION, SOLUTION INTRAVENOUS CONTINUOUS
Status: DISCONTINUED | OUTPATIENT
Start: 2022-04-29 | End: 2022-05-02

## 2022-04-29 RX ADMIN — SODIUM CHLORIDE, PRESERVATIVE FREE 10 ML: 5 INJECTION INTRAVENOUS at 19:59

## 2022-04-29 RX ADMIN — OXYCODONE HYDROCHLORIDE 10 MG: 5 TABLET ORAL at 05:16

## 2022-04-29 RX ADMIN — ACETAMINOPHEN 1000 MG: 500 TABLET ORAL at 23:36

## 2022-04-29 RX ADMIN — OXYCODONE HYDROCHLORIDE 10 MG: 5 TABLET ORAL at 18:05

## 2022-04-29 RX ADMIN — SODIUM CHLORIDE, PRESERVATIVE FREE 10 ML: 5 INJECTION INTRAVENOUS at 08:30

## 2022-04-29 RX ADMIN — QUETIAPINE FUMARATE 100 MG: 100 TABLET ORAL at 02:49

## 2022-04-29 RX ADMIN — DEXMEDETOMIDINE HYDROCHLORIDE 1.5 MCG/KG/HR: 4 INJECTION, SOLUTION INTRAVENOUS at 22:37

## 2022-04-29 RX ADMIN — ACETAMINOPHEN 1000 MG: 500 TABLET ORAL at 13:03

## 2022-04-29 RX ADMIN — OLANZAPINE 5 MG: 5 TABLET, FILM COATED ORAL at 23:37

## 2022-04-29 RX ADMIN — DEXMEDETOMIDINE HYDROCHLORIDE 1.5 MCG/KG/HR: 4 INJECTION, SOLUTION INTRAVENOUS at 19:56

## 2022-04-29 RX ADMIN — FENTANYL CITRATE 50 MCG: 50 INJECTION INTRAMUSCULAR; INTRAVENOUS at 04:01

## 2022-04-29 RX ADMIN — DIAZEPAM 10 MG: 5 TABLET ORAL at 11:50

## 2022-04-29 RX ADMIN — DEXMEDETOMIDINE HYDROCHLORIDE 1.5 MCG/KG/HR: 4 INJECTION, SOLUTION INTRAVENOUS at 14:32

## 2022-04-29 RX ADMIN — DEXMEDETOMIDINE HYDROCHLORIDE 1.5 MCG/KG/HR: 4 INJECTION, SOLUTION INTRAVENOUS at 09:30

## 2022-04-29 RX ADMIN — ERYTHROMYCIN: 5 OINTMENT OPHTHALMIC at 23:37

## 2022-04-29 RX ADMIN — ENOXAPARIN SODIUM 30 MG: 100 INJECTION SUBCUTANEOUS at 08:30

## 2022-04-29 RX ADMIN — Medication 10 MG: at 08:31

## 2022-04-29 RX ADMIN — DIAZEPAM 10 MG: 5 TABLET ORAL at 23:43

## 2022-04-29 RX ADMIN — FAMOTIDINE 20 MG: 20 TABLET, FILM COATED ORAL at 23:37

## 2022-04-29 RX ADMIN — OXYCODONE HYDROCHLORIDE 10 MG: 5 TABLET ORAL at 00:53

## 2022-04-29 RX ADMIN — DIAZEPAM 10 MG: 5 TABLET ORAL at 18:05

## 2022-04-29 RX ADMIN — FOLIC ACID 1 MG: 1 TABLET ORAL at 08:31

## 2022-04-29 RX ADMIN — ENOXAPARIN SODIUM 30 MG: 100 INJECTION SUBCUTANEOUS at 23:36

## 2022-04-29 RX ADMIN — FENTANYL CITRATE 50 MCG: 50 INJECTION INTRAMUSCULAR; INTRAVENOUS at 13:03

## 2022-04-29 RX ADMIN — GABAPENTIN 600 MG: 600 TABLET ORAL at 13:03

## 2022-04-29 RX ADMIN — SODIUM CHLORIDE, POTASSIUM CHLORIDE, SODIUM LACTATE AND CALCIUM CHLORIDE 1000 ML: 600; 310; 30; 20 INJECTION, SOLUTION INTRAVENOUS at 14:06

## 2022-04-29 RX ADMIN — FENTANYL CITRATE 50 MCG: 50 INJECTION INTRAMUSCULAR; INTRAVENOUS at 06:41

## 2022-04-29 RX ADMIN — FENTANYL CITRATE 50 MCG: 50 INJECTION INTRAMUSCULAR; INTRAVENOUS at 09:46

## 2022-04-29 RX ADMIN — POLYETHYLENE GLYCOL 3350 17 G: 17 POWDER, FOR SOLUTION ORAL at 08:31

## 2022-04-29 RX ADMIN — QUETIAPINE FUMARATE 100 MG: 100 TABLET ORAL at 09:58

## 2022-04-29 RX ADMIN — DEXMEDETOMIDINE HYDROCHLORIDE 1.5 MCG/KG/HR: 4 INJECTION, SOLUTION INTRAVENOUS at 03:56

## 2022-04-29 RX ADMIN — QUETIAPINE FUMARATE 100 MG: 100 TABLET ORAL at 18:05

## 2022-04-29 RX ADMIN — DEXMEDETOMIDINE HYDROCHLORIDE 1.5 MCG/KG/HR: 4 INJECTION, SOLUTION INTRAVENOUS at 11:56

## 2022-04-29 RX ADMIN — DEXMEDETOMIDINE HYDROCHLORIDE 1.5 MCG/KG/HR: 4 INJECTION, SOLUTION INTRAVENOUS at 17:07

## 2022-04-29 RX ADMIN — CLONIDINE HYDROCHLORIDE 0.2 MG: 0.2 TABLET ORAL at 14:30

## 2022-04-29 RX ADMIN — PIPERACILLIN AND TAZOBACTAM 3375 MG: 3; .375 INJECTION, POWDER, FOR SOLUTION INTRAVENOUS at 09:02

## 2022-04-29 RX ADMIN — FENTANYL CITRATE 50 MCG: 50 INJECTION INTRAMUSCULAR; INTRAVENOUS at 19:58

## 2022-04-29 RX ADMIN — OXYCODONE HYDROCHLORIDE 10 MG: 5 TABLET ORAL at 23:43

## 2022-04-29 RX ADMIN — PIPERACILLIN AND TAZOBACTAM 3375 MG: 3; .375 INJECTION, POWDER, FOR SOLUTION INTRAVENOUS at 01:00

## 2022-04-29 RX ADMIN — CLONIDINE HYDROCHLORIDE 0.2 MG: 0.2 TABLET ORAL at 05:18

## 2022-04-29 RX ADMIN — PIPERACILLIN AND TAZOBACTAM 3375 MG: 3; .375 INJECTION, POWDER, FOR SOLUTION INTRAVENOUS at 17:06

## 2022-04-29 RX ADMIN — DIAZEPAM 10 MG: 5 TABLET ORAL at 05:17

## 2022-04-29 RX ADMIN — CLONIDINE HYDROCHLORIDE 0.2 MG: 0.2 TABLET ORAL at 23:36

## 2022-04-29 RX ADMIN — DEXMEDETOMIDINE HYDROCHLORIDE 1.5 MCG/KG/HR: 4 INJECTION, SOLUTION INTRAVENOUS at 01:04

## 2022-04-29 RX ADMIN — ACETAMINOPHEN 1000 MG: 500 TABLET ORAL at 05:17

## 2022-04-29 RX ADMIN — DIAZEPAM 10 MG: 5 TABLET ORAL at 00:53

## 2022-04-29 RX ADMIN — FAMOTIDINE 20 MG: 20 TABLET, FILM COATED ORAL at 08:30

## 2022-04-29 RX ADMIN — HALOPERIDOL LACTATE 5 MG: 5 INJECTION, SOLUTION INTRAMUSCULAR at 04:24

## 2022-04-29 RX ADMIN — DOCUSATE SODIUM 50 MG AND SENNOSIDES 8.6 MG 1 TABLET: 8.6; 5 TABLET, FILM COATED ORAL at 23:36

## 2022-04-29 RX ADMIN — DOCUSATE SODIUM 50 MG AND SENNOSIDES 8.6 MG 1 TABLET: 8.6; 5 TABLET, FILM COATED ORAL at 08:30

## 2022-04-29 RX ADMIN — DEXMEDETOMIDINE HYDROCHLORIDE 1.5 MCG/KG/HR: 4 INJECTION, SOLUTION INTRAVENOUS at 06:48

## 2022-04-29 RX ADMIN — GABAPENTIN 600 MG: 600 TABLET ORAL at 23:36

## 2022-04-29 RX ADMIN — HALOPERIDOL LACTATE 5 MG: 5 INJECTION, SOLUTION INTRAMUSCULAR at 22:07

## 2022-04-29 RX ADMIN — OXYCODONE HYDROCHLORIDE 10 MG: 5 TABLET ORAL at 11:50

## 2022-04-29 RX ADMIN — GABAPENTIN 600 MG: 600 TABLET ORAL at 05:17

## 2022-04-29 ASSESSMENT — PULMONARY FUNCTION TESTS
PIF_VALUE: 10
PIF_VALUE: 11
PIF_VALUE: 10
PIF_VALUE: 11
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
## 2022-04-29 NOTE — CARE COORDINATION
Transitional planning. Lupe Ruiz RN called and informed CM that pt's IV antibiotic was discontinued today. Reviewing notes with her, CM 4/26 note states [facility] could not accept d/t cost of meds. She requested that new medication list be faxed once pt is off precedex and/or antibiotics. 1615 LM for Tenna Necessary with [facility], [phone], to call CM back concerning pt's updated MAR.    Faxed updated MAR to [phone]
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
## 2022-04-29 NOTE — PLAN OF CARE
Problem: OXYGENATION/RESPIRATORY FUNCTION  Goal: Patient will maintain patent airway  Outcome: Progressing     Problem: OXYGENATION/RESPIRATORY FUNCTION  Goal: Patient will achieve/maintain normal respiratory rate/effort  Description: Respiratory rate and effort will be within normal limits for the patient  Outcome: Progressing     Problem: SKIN INTEGRITY  Goal: Skin integrity is maintained or improved  Outcome: Progressing     Problem: MECHANICAL VENTILATION  Goal: Patient will maintain patent airway  Outcome: Progressing     Problem: MECHANICAL VENTILATION  Goal: Oral health is maintained or improved  Outcome: Progressing     Problem: MECHANICAL VENTILATION  Goal: ET tube will be managed safely  Outcome: Progressing     Problem: MECHANICAL VENTILATION  Goal: Ability to express needs and understand communication  Outcome: Progressing     Problem: MECHANICAL VENTILATION  Goal: Mobility/activity is maintained at optimum level for patient  Outcome: Progressing     Problem: Skin Integrity:  Goal: Will show no infection signs and symptoms  Description: Will show no infection signs and symptoms  Outcome: Progressing     Problem: Skin Integrity:  Goal: Absence of new skin breakdown  Description: Absence of new skin breakdown  Outcome: Progressing   Ventilator Bronchodilator assessment    Breath sounds: clear   Inspiratory Pressure: 20  Plateau Pressure: 18    Patient assessed at level 1          [x]    Bronchodilator Assessment    BRONCHODILATOR ASSESSMENT SCORE  Score 0 (Home) 1 2 3 4   Breath Sounds   []  Chronic Ventilator: Patient at baseline [x]  Mild Wheezes/ Clear []  Intermittent wheezes with good air entry []  Bilateral/unilateral wheezing with diminished air entry []  Insp/Exp wheeze and/or poor aeration   Ventilator Pressures   []  Chronic Ventilator [x]  Insp. Pressure less than 25 cm H20 [x]  Insp. Pressure less than 25 cm H20 []  Insp. Pressure exceeds 25 cm H20 []  Insp.  Pressure exceeds 30 cm H20 Plateau Pressure []  NA   [x]  Plateau Pressure less than 4  []  Plateau Pressure less than or equal to 5 []  Plateau Pressure greater than or equal to 6 []  Plateau Pressure greater than or equal to [address] [facility] Valentino Espinosa, ALDAIR  9:56 AM

## 2022-04-29 NOTE — PLAN OF CARE
Problem: OXYGENATION/RESPIRATORY FUNCTION  Goal: Patient will maintain patent airway  4/29/2022 1816 by Phnog Gorman RN  Outcome: Progressing  4/29/2022 0956 by Bobbi English RCP  Outcome: Progressing  Goal: Patient will achieve/maintain normal respiratory rate/effort  Description: Respiratory rate and effort will be within normal limits for the patient  4/29/2022 1816 by Phong Gorman RN  Outcome: Progressing  4/29/2022 0956 by Bobbi English RCP  Outcome: Progressing     Problem: SKIN INTEGRITY  Goal: Skin integrity is maintained or improved  4/29/2022 1816 by Phong Gorman RN  Outcome: Progressing  4/29/2022 0956 by Bobbi English RCP  Outcome: Progressing     Problem: MECHANICAL VENTILATION  Goal: Patient will maintain patent airway  4/29/2022 1816 by Phong Gorman RN  Outcome: Progressing  4/29/2022 0956 by Bobbi English RCP  Outcome: Progressing  Goal: Oral health is maintained or improved  4/29/2022 1816 by Phong Gorman RN  Outcome: Progressing  4/29/2022 0956 by Bobbi English RCP  Outcome: Progressing  Goal: ET tube will be managed safely  4/29/2022 1816 by Phong Gorman RN  Outcome: Progressing  4/29/2022 0956 by Bobbi English RCP  Outcome: Progressing  Goal: Ability to express needs and understand communication  4/29/2022 1816 by Phong Gorman RN  Outcome: Progressing  4/29/2022 0956 by Bobbi English RCP  Outcome: Progressing  Goal: Mobility/activity is maintained at optimum level for patient  Description: Q 2 hours and PRN  4/29/2022 1816 by Phong Gorman RN  Outcome: Progressing  4/29/2022 0956 by Bobbi Engilsh RCP  Outcome: Progressing     Problem: Non-Violent Restraints  Goal: Removal from restraints as soon as assessed to be safe  Outcome: Progressing  Goal: No harm/injury to patient while restraints in use  Outcome: Progressing  Goal: Patient's dignity will be maintained  Outcome: Progressing     Problem: Skin Integrity:  Goal: Will show no infection signs and symptoms  Description: Will show no infection signs and symptoms  4/29/2022 1816 by Manohar Mata RN  Outcome: Progressing  4/29/2022 0956 by Rohit Fletcher RCP  Outcome: Progressing  Goal: Absence of new skin breakdown  Description: Absence of new skin breakdown  4/29/2022 1816 by Manohar Mata RN  Outcome: Progressing  4/29/2022 0956 by Rohit Fletcher RCP  Outcome: Progressing     Problem: Falls - Risk of:  Goal: Will remain free from falls  Description: Will remain free from falls  Outcome: Progressing  Goal: Absence of physical injury  Description: Absence of physical injury  Outcome: Progressing     Problem: Nutrition  Goal: Optimal nutrition therapy  Outcome: Progressing     Problem: Discharge Planning  Goal: Discharge to home or other facility with appropriate resources  Outcome: Progressing     Problem: Safety - Medical Restraint  Goal: Remains free of injury from restraints (Restraint for Interference with Medical Device)  Description: INTERVENTIONS:  1. Determine that other, less restrictive measures have been tried or would not be effective before applying the restraint  2. Evaluate the patient's condition at the time of restraint application  3. Inform patient/family regarding the reason for restraint  4.  Q2H: Monitor safety, psychosocial status, comfort, nutrition and hydration  Outcome: Progressing     Problem: Pain  Goal: Verbalizes/displays adequate comfort level or baseline comfort level  Outcome: Progressing
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
## 2022-04-30 LAB
ALLEN TEST: POSITIVE
EKG ATRIAL RATE: 116 BPM
EKG P AXIS: 66 DEGREES
EKG P-R INTERVAL: 152 MS
EKG Q-T INTERVAL: 340 MS
EKG QRS DURATION: 98 MS
EKG QTC CALCULATION (BAZETT): 472 MS
EKG R AXIS: 32 DEGREES
EKG T AXIS: 51 DEGREES
EKG VENTRICULAR RATE: 116 BPM
FIO2: 30
MAGNESIUM: 2.6 MG/DL (ref 1.6–2.6)
MODE: ABNORMAL
O2 DEVICE/FLOW/%: ABNORMAL
POC HCO3: 29.4 MMOL/L (ref 21–28)
POC O2 SATURATION: 94 % (ref 94–98)
POC PCO2: 46.1 MM HG (ref 35–48)
POC PH: 7.41 (ref 7.35–7.45)
POC PO2: 69.1 MM HG (ref 83–108)
POSITIVE BASE EXCESS, ART: 4 (ref 0–3)
SAMPLE SITE: ABNORMAL

## 2022-04-30 PROCEDURE — 6370000000 HC RX 637 (ALT 250 FOR IP): Performed by: STUDENT IN AN ORGANIZED HEALTH CARE EDUCATION/TRAINING PROGRAM

## 2022-04-30 PROCEDURE — 82803 BLOOD GASES ANY COMBINATION: CPT

## 2022-04-30 PROCEDURE — 6370000000 HC RX 637 (ALT 250 FOR IP): Performed by: NURSE PRACTITIONER

## 2022-04-30 PROCEDURE — 2700000000 HC OXYGEN THERAPY PER DAY

## 2022-04-30 PROCEDURE — 94003 VENT MGMT INPAT SUBQ DAY: CPT

## 2022-04-30 PROCEDURE — 6360000002 HC RX W HCPCS: Performed by: STUDENT IN AN ORGANIZED HEALTH CARE EDUCATION/TRAINING PROGRAM

## 2022-04-30 PROCEDURE — 2500000003 HC RX 250 WO HCPCS: Performed by: STUDENT IN AN ORGANIZED HEALTH CARE EDUCATION/TRAINING PROGRAM

## 2022-04-30 PROCEDURE — 6360000002 HC RX W HCPCS: Performed by: NURSE PRACTITIONER

## 2022-04-30 PROCEDURE — 6370000000 HC RX 637 (ALT 250 FOR IP): Performed by: EMERGENCY MEDICINE

## 2022-04-30 PROCEDURE — 2580000003 HC RX 258: Performed by: EMERGENCY MEDICINE

## 2022-04-30 PROCEDURE — 94761 N-INVAS EAR/PLS OXIMETRY MLT: CPT

## 2022-04-30 PROCEDURE — 2580000003 HC RX 258: Performed by: NURSE PRACTITIONER

## 2022-04-30 PROCEDURE — 6370000000 HC RX 637 (ALT 250 FOR IP): Performed by: SURGERY

## 2022-04-30 PROCEDURE — 36600 WITHDRAWAL OF ARTERIAL BLOOD: CPT

## 2022-04-30 PROCEDURE — 2000000000 HC ICU R&B

## 2022-04-30 PROCEDURE — 36415 COLL VENOUS BLD VENIPUNCTURE: CPT

## 2022-04-30 PROCEDURE — 83735 ASSAY OF MAGNESIUM: CPT

## 2022-04-30 PROCEDURE — 6360000002 HC RX W HCPCS: Performed by: EMERGENCY MEDICINE

## 2022-04-30 PROCEDURE — 93005 ELECTROCARDIOGRAM TRACING: CPT | Performed by: SURGERY

## 2022-04-30 RX ORDER — QUETIAPINE FUMARATE 25 MG/1
50 TABLET, FILM COATED ORAL EVERY 8 HOURS
Status: DISCONTINUED | OUTPATIENT
Start: 2022-04-30 | End: 2022-05-02

## 2022-04-30 RX ADMIN — DEXMEDETOMIDINE HYDROCHLORIDE 0.9 MCG/KG/HR: 4 INJECTION, SOLUTION INTRAVENOUS at 17:50

## 2022-04-30 RX ADMIN — ACETAMINOPHEN 1000 MG: 500 TABLET ORAL at 05:40

## 2022-04-30 RX ADMIN — OXYCODONE HYDROCHLORIDE 10 MG: 5 TABLET ORAL at 05:40

## 2022-04-30 RX ADMIN — OLANZAPINE 5 MG: 5 TABLET, FILM COATED ORAL at 20:05

## 2022-04-30 RX ADMIN — QUETIAPINE FUMARATE 100 MG: 100 TABLET ORAL at 03:06

## 2022-04-30 RX ADMIN — ENOXAPARIN SODIUM 30 MG: 100 INJECTION SUBCUTANEOUS at 12:10

## 2022-04-30 RX ADMIN — FAMOTIDINE 20 MG: 20 TABLET, FILM COATED ORAL at 09:39

## 2022-04-30 RX ADMIN — QUETIAPINE FUMARATE 100 MG: 100 TABLET ORAL at 09:42

## 2022-04-30 RX ADMIN — CLONIDINE HYDROCHLORIDE 0.2 MG: 0.2 TABLET ORAL at 05:41

## 2022-04-30 RX ADMIN — FENTANYL CITRATE 50 MCG: 50 INJECTION INTRAMUSCULAR; INTRAVENOUS at 21:36

## 2022-04-30 RX ADMIN — OXYCODONE HYDROCHLORIDE 10 MG: 5 TABLET ORAL at 17:42

## 2022-04-30 RX ADMIN — CLONIDINE HYDROCHLORIDE 0.2 MG: 0.2 TABLET ORAL at 16:12

## 2022-04-30 RX ADMIN — CLONIDINE HYDROCHLORIDE 0.2 MG: 0.2 TABLET ORAL at 21:01

## 2022-04-30 RX ADMIN — SODIUM CHLORIDE, PRESERVATIVE FREE 10 ML: 5 INJECTION INTRAVENOUS at 21:17

## 2022-04-30 RX ADMIN — DEXMEDETOMIDINE HYDROCHLORIDE 1.5 MCG/KG/HR: 4 INJECTION, SOLUTION INTRAVENOUS at 09:43

## 2022-04-30 RX ADMIN — DEXMEDETOMIDINE HYDROCHLORIDE 0.9 MCG/KG/HR: 4 INJECTION, SOLUTION INTRAVENOUS at 23:26

## 2022-04-30 RX ADMIN — OXYCODONE HYDROCHLORIDE 10 MG: 5 TABLET ORAL at 23:28

## 2022-04-30 RX ADMIN — DEXMEDETOMIDINE HYDROCHLORIDE 1.5 MCG/KG/HR: 4 INJECTION, SOLUTION INTRAVENOUS at 01:16

## 2022-04-30 RX ADMIN — GABAPENTIN 600 MG: 600 TABLET ORAL at 20:05

## 2022-04-30 RX ADMIN — POLYETHYLENE GLYCOL 3350 17 G: 17 POWDER, FOR SOLUTION ORAL at 09:39

## 2022-04-30 RX ADMIN — DIAZEPAM 10 MG: 5 TABLET ORAL at 12:10

## 2022-04-30 RX ADMIN — FENTANYL CITRATE 50 MCG: 50 INJECTION INTRAMUSCULAR; INTRAVENOUS at 20:01

## 2022-04-30 RX ADMIN — FOLIC ACID 1 MG: 1 TABLET ORAL at 09:41

## 2022-04-30 RX ADMIN — ERYTHROMYCIN: 5 OINTMENT OPHTHALMIC at 20:05

## 2022-04-30 RX ADMIN — DEXMEDETOMIDINE HYDROCHLORIDE 1.5 MCG/KG/HR: 4 INJECTION, SOLUTION INTRAVENOUS at 14:57

## 2022-04-30 RX ADMIN — ACETAMINOPHEN 1000 MG: 500 TABLET ORAL at 21:02

## 2022-04-30 RX ADMIN — QUETIAPINE FUMARATE 50 MG: 25 TABLET ORAL at 17:37

## 2022-04-30 RX ADMIN — DOCUSATE SODIUM 50 MG AND SENNOSIDES 8.6 MG 1 TABLET: 8.6; 5 TABLET, FILM COATED ORAL at 09:41

## 2022-04-30 RX ADMIN — DEXMEDETOMIDINE HYDROCHLORIDE 1.5 MCG/KG/HR: 4 INJECTION, SOLUTION INTRAVENOUS at 06:50

## 2022-04-30 RX ADMIN — GABAPENTIN 600 MG: 600 TABLET ORAL at 05:41

## 2022-04-30 RX ADMIN — PIPERACILLIN AND TAZOBACTAM 3375 MG: 3; .375 INJECTION, POWDER, FOR SOLUTION INTRAVENOUS at 01:36

## 2022-04-30 RX ADMIN — FAMOTIDINE 20 MG: 20 TABLET, FILM COATED ORAL at 20:05

## 2022-04-30 RX ADMIN — Medication 10 MG: at 12:10

## 2022-04-30 RX ADMIN — DEXMEDETOMIDINE HYDROCHLORIDE 1.5 MCG/KG/HR: 4 INJECTION, SOLUTION INTRAVENOUS at 12:23

## 2022-04-30 RX ADMIN — DOCUSATE SODIUM 50 MG AND SENNOSIDES 8.6 MG 1 TABLET: 8.6; 5 TABLET, FILM COATED ORAL at 20:05

## 2022-04-30 RX ADMIN — GABAPENTIN 600 MG: 600 TABLET ORAL at 13:48

## 2022-04-30 RX ADMIN — OXYCODONE HYDROCHLORIDE 10 MG: 5 TABLET ORAL at 12:10

## 2022-04-30 RX ADMIN — DEXMEDETOMIDINE HYDROCHLORIDE 1.4 MCG/KG/HR: 4 INJECTION, SOLUTION INTRAVENOUS at 03:57

## 2022-04-30 RX ADMIN — DIAZEPAM 10 MG: 5 TABLET ORAL at 23:28

## 2022-04-30 RX ADMIN — DIAZEPAM 10 MG: 5 TABLET ORAL at 17:42

## 2022-04-30 RX ADMIN — DIAZEPAM 10 MG: 5 TABLET ORAL at 05:40

## 2022-04-30 RX ADMIN — ACETAMINOPHEN 1000 MG: 500 TABLET ORAL at 13:48

## 2022-04-30 RX ADMIN — ENOXAPARIN SODIUM 30 MG: 100 INJECTION SUBCUTANEOUS at 21:02

## 2022-04-30 ASSESSMENT — PAIN SCALES - WONG BAKER: WONGBAKER_NUMERICALRESPONSE: 0

## 2022-04-30 ASSESSMENT — PULMONARY FUNCTION TESTS
PIF_VALUE: 10

## 2022-04-30 ASSESSMENT — PAIN SCALES - GENERAL: PAINLEVEL_OUTOF10: 0

## 2022-04-30 NOTE — PLAN OF CARE
Problem: OXYGENATION/RESPIRATORY FUNCTION  Goal: Patient will maintain patent airway  Outcome: Progressing  Goal: Patient will achieve/maintain normal respiratory rate/effort  Description: Respiratory rate and effort will be within normal limits for the patient  Outcome: Progressing     Problem: MECHANICAL VENTILATION  Goal: Patient will maintain patent airway  Outcome: Progressing  Goal: Oral health is maintained or improved  Outcome: Progressing  Goal: ET tube will be managed safely  Outcome: Progressing  Goal: Ability to express needs and understand communication  Outcome: Progressing  Goal: Mobility/activity is maintained at optimum level for patient  Outcome: Progressing     Problem: SKIN INTEGRITY  Goal: Skin integrity is maintained or improved  4/30/2022 0831 by Karis Patel RCP  Outcome: Progressing  4/30/2022 0408 by Philomena Carnes RN  Outcome: Progressing

## 2022-04-30 NOTE — PLAN OF CARE
Problem: OXYGENATION/RESPIRATORY FUNCTION  Goal: Patient will maintain patent airway  4/30/2022 1705 by Fransico Lorenzana RN  Outcome: Progressing  4/30/2022 0831 by Jesús Nicolas RCP  Outcome: Progressing  Goal: Patient will achieve/maintain normal respiratory rate/effort  Description: Respiratory rate and effort will be within normal limits for the patient  4/30/2022 1705 by Fransico Lorenzana RN  Outcome: Progressing  4/30/2022 0831 by Jesús Nicolas RCP  Outcome: Progressing     Problem: SKIN INTEGRITY  Goal: Skin integrity is maintained or improved  4/30/2022 1705 by Fransico Lorenzana RN  Outcome: Progressing  4/30/2022 0831 by Jesús Nicolas RCP  Outcome: Progressing  4/30/2022 0408 by Tye Sunshine RN  Outcome: Progressing     Problem: MECHANICAL VENTILATION  Goal: Patient will maintain patent airway  4/30/2022 1705 by Fransico Lorenzana RN  Outcome: Progressing  4/30/2022 0831 by Jesús Nicolas RCP  Outcome: Progressing  Goal: Oral health is maintained or improved  4/30/2022 1705 by Fransico Lorenzana RN  Outcome: Progressing  4/30/2022 0831 by Jesús Nicolas RCP  Outcome: Progressing  Goal: ET tube will be managed safely  4/30/2022 1705 by Fransico Lorenzana RN  Outcome: Progressing  4/30/2022 0831 by Jesús Nicolas RCP  Outcome: Progressing  Goal: Ability to express needs and understand communication  4/30/2022 1705 by Fransico Lorenzana RN  Outcome: Progressing  4/30/2022 0831 by Jesús Nicolas RCP  Outcome: Progressing  Goal: Mobility/activity is maintained at optimum level for patient  4/30/2022 1705 by rFansico Lorenzana RN  Outcome: Progressing  4/30/2022 0831 by Jesús Nicolas RCP  Outcome: Progressing     Problem: Non-Violent Restraints  Goal: Removal from restraints as soon as assessed to be safe  4/30/2022 1705 by Fransico Lorenzana RN  Outcome: Progressing  4/30/2022 0831 by Jesús Nicolas RCP  Outcome: Progressing  4/30/2022 0408 by Tye Sunshine RN  Outcome: Not Progressing  Goal: No harm/injury to patient while restraints in use  4/30/2022 1705 by Dakota Alvarez RN  Outcome: Progressing  4/30/2022 0831 by Paxton Bonilla RCP  Outcome: Progressing  4/30/2022 0408 by Tracee Waldrop RN  Outcome: Progressing  Goal: Patient's dignity will be maintained  4/30/2022 1705 by Dakota Alvarez RN  Outcome: Progressing  4/30/2022 0831 by Paxton Bonilla RCP  Outcome: Progressing  4/30/2022 0408 by Tracee Waldrop RN  Outcome: Progressing     Problem: Skin Integrity:  Goal: Will show no infection signs and symptoms  Description: Will show no infection signs and symptoms  4/30/2022 1705 by Dakota Alvarez RN  Outcome: Progressing  4/30/2022 0831 by Paxton Bonilla RCP  Outcome: Progressing  4/30/2022 0408 by Tracee Waldrop RN  Outcome: Progressing  Goal: Absence of new skin breakdown  Description: Absence of new skin breakdown  4/30/2022 1705 by Dakota Alvarez RN  Outcome: Progressing  4/30/2022 0831 by Paxton Bonilla RCP  Outcome: Progressing     Problem: Falls - Risk of:  Goal: Will remain free from falls  Description: Will remain free from falls  4/30/2022 1705 by Dakota Alvarez RN  Outcome: Progressing  4/30/2022 0831 by Paxton Bonilla RCP  Outcome: Progressing  4/30/2022 0408 by Tracee Waldrop RN  Outcome: Progressing  Goal: Absence of physical injury  Description: Absence of physical injury  4/30/2022 1705 by Dakota Alvarez RN  Outcome: Progressing  4/30/2022 0831 by Paxton Bonilla RCP  Outcome: Progressing     Problem: Nutrition  Goal: Optimal nutrition therapy  4/30/2022 1705 by Dakota Alvarez RN  Outcome: Progressing  4/30/2022 0831 by Paxton Bonilla RCP  Outcome: Progressing  4/30/2022 0408 by Tracee Waldrop RN  Outcome: Progressing     Problem: Discharge Planning  Goal: Discharge to home or other facility with appropriate resources  4/30/2022 1705 by Dakota Alvarez RN  Outcome: Progressing  4/30/2022 0831 by Gildardo Villa RCP  Outcome: Progressing     Problem: ABCDS Injury Assessment  Goal: Absence of physical injury  4/30/2022 1705 by Izabella Lucas RN  Outcome: Progressing  4/30/2022 0831 by Gildardo Villa RCP  Outcome: Progressing
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
## 2022-05-01 LAB
ALLEN TEST: POSITIVE
ANION GAP SERPL CALCULATED.3IONS-SCNC: 9 MMOL/L (ref 9–17)
BUN BLDV-MCNC: 28 MG/DL (ref 6–20)
CALCIUM SERPL-MCNC: 10 MG/DL (ref 8.6–10.4)
CHLORIDE BLD-SCNC: 99 MMOL/L (ref 98–107)
CO2: 28 MMOL/L (ref 20–31)
CREAT SERPL-MCNC: 0.8 MG/DL (ref 0.7–1.2)
FIO2: 40
GFR AFRICAN AMERICAN: >60 ML/MIN
GFR NON-AFRICAN AMERICAN: >60 ML/MIN
GFR SERPL CREATININE-BSD FRML MDRD: ABNORMAL ML/MIN/{1.73_M2}
GLUCOSE BLD-MCNC: 102 MG/DL (ref 70–99)
HCT VFR BLD CALC: 38.1 % (ref 40.7–50.3)
HEMOGLOBIN: 12.9 G/DL (ref 13–17)
MCH RBC QN AUTO: 32.1 PG (ref 25.2–33.5)
MCHC RBC AUTO-ENTMCNC: 33.9 G/DL (ref 28.4–34.8)
MCV RBC AUTO: 94.8 FL (ref 82.6–102.9)
NRBC AUTOMATED: 0 PER 100 WBC
O2 DEVICE/FLOW/%: ABNORMAL
PDW BLD-RTO: 11.7 % (ref 11.8–14.4)
PLATELET # BLD: 455 K/UL (ref 138–453)
PMV BLD AUTO: 9.4 FL (ref 8.1–13.5)
POC HCO3: 27.8 MMOL/L (ref 21–28)
POC O2 SATURATION: 99 % (ref 94–98)
POC PCO2: 33.4 MM HG (ref 35–48)
POC PH: 7.53 (ref 7.35–7.45)
POC PO2: 115.2 MM HG (ref 83–108)
POSITIVE BASE EXCESS, ART: 5 (ref 0–3)
POTASSIUM SERPL-SCNC: 4.6 MMOL/L (ref 3.7–5.3)
RBC # BLD: 4.02 M/UL (ref 4.21–5.77)
SAMPLE SITE: ABNORMAL
SODIUM BLD-SCNC: 136 MMOL/L (ref 135–144)
WBC # BLD: 8.8 K/UL (ref 3.5–11.3)

## 2022-05-01 PROCEDURE — 82803 BLOOD GASES ANY COMBINATION: CPT

## 2022-05-01 PROCEDURE — 2580000003 HC RX 258: Performed by: EMERGENCY MEDICINE

## 2022-05-01 PROCEDURE — 94761 N-INVAS EAR/PLS OXIMETRY MLT: CPT

## 2022-05-01 PROCEDURE — 6360000002 HC RX W HCPCS: Performed by: STUDENT IN AN ORGANIZED HEALTH CARE EDUCATION/TRAINING PROGRAM

## 2022-05-01 PROCEDURE — 6370000000 HC RX 637 (ALT 250 FOR IP): Performed by: NURSE PRACTITIONER

## 2022-05-01 PROCEDURE — 2500000003 HC RX 250 WO HCPCS: Performed by: STUDENT IN AN ORGANIZED HEALTH CARE EDUCATION/TRAINING PROGRAM

## 2022-05-01 PROCEDURE — 36415 COLL VENOUS BLD VENIPUNCTURE: CPT

## 2022-05-01 PROCEDURE — 80048 BASIC METABOLIC PNL TOTAL CA: CPT

## 2022-05-01 PROCEDURE — 2700000000 HC OXYGEN THERAPY PER DAY

## 2022-05-01 PROCEDURE — 93005 ELECTROCARDIOGRAM TRACING: CPT | Performed by: STUDENT IN AN ORGANIZED HEALTH CARE EDUCATION/TRAINING PROGRAM

## 2022-05-01 PROCEDURE — 6370000000 HC RX 637 (ALT 250 FOR IP): Performed by: EMERGENCY MEDICINE

## 2022-05-01 PROCEDURE — 6370000000 HC RX 637 (ALT 250 FOR IP): Performed by: STUDENT IN AN ORGANIZED HEALTH CARE EDUCATION/TRAINING PROGRAM

## 2022-05-01 PROCEDURE — 36600 WITHDRAWAL OF ARTERIAL BLOOD: CPT

## 2022-05-01 PROCEDURE — 6360000002 HC RX W HCPCS: Performed by: EMERGENCY MEDICINE

## 2022-05-01 PROCEDURE — 93005 ELECTROCARDIOGRAM TRACING: CPT | Performed by: SURGERY

## 2022-05-01 PROCEDURE — 2000000000 HC ICU R&B

## 2022-05-01 PROCEDURE — 85027 COMPLETE CBC AUTOMATED: CPT

## 2022-05-01 RX ADMIN — DEXMEDETOMIDINE HYDROCHLORIDE 0.9 MCG/KG/HR: 4 INJECTION, SOLUTION INTRAVENOUS at 04:39

## 2022-05-01 RX ADMIN — HALOPERIDOL LACTATE 5 MG: 5 INJECTION, SOLUTION INTRAMUSCULAR at 06:02

## 2022-05-01 RX ADMIN — OXYCODONE HYDROCHLORIDE 10 MG: 5 TABLET ORAL at 05:00

## 2022-05-01 RX ADMIN — FAMOTIDINE 20 MG: 20 TABLET, FILM COATED ORAL at 08:15

## 2022-05-01 RX ADMIN — ENOXAPARIN SODIUM 30 MG: 100 INJECTION SUBCUTANEOUS at 08:14

## 2022-05-01 RX ADMIN — CLONIDINE HYDROCHLORIDE 0.2 MG: 0.2 TABLET ORAL at 13:36

## 2022-05-01 RX ADMIN — QUETIAPINE FUMARATE 50 MG: 25 TABLET ORAL at 19:56

## 2022-05-01 RX ADMIN — DEXMEDETOMIDINE HYDROCHLORIDE 1.5 MCG/KG/HR: 4 INJECTION, SOLUTION INTRAVENOUS at 18:04

## 2022-05-01 RX ADMIN — POLYETHYLENE GLYCOL 3350 17 G: 17 POWDER, FOR SOLUTION ORAL at 08:14

## 2022-05-01 RX ADMIN — SODIUM CHLORIDE, PRESERVATIVE FREE 10 ML: 5 INJECTION INTRAVENOUS at 21:21

## 2022-05-01 RX ADMIN — DOCUSATE SODIUM 50 MG AND SENNOSIDES 8.6 MG 1 TABLET: 8.6; 5 TABLET, FILM COATED ORAL at 08:15

## 2022-05-01 RX ADMIN — DIAZEPAM 10 MG: 5 TABLET ORAL at 18:03

## 2022-05-01 RX ADMIN — FENTANYL CITRATE 50 MCG: 50 INJECTION INTRAMUSCULAR; INTRAVENOUS at 02:13

## 2022-05-01 RX ADMIN — FENTANYL CITRATE 50 MCG: 50 INJECTION INTRAMUSCULAR; INTRAVENOUS at 20:40

## 2022-05-01 RX ADMIN — FENTANYL CITRATE 50 MCG: 50 INJECTION INTRAMUSCULAR; INTRAVENOUS at 04:42

## 2022-05-01 RX ADMIN — HALOPERIDOL LACTATE 5 MG: 5 INJECTION, SOLUTION INTRAMUSCULAR at 12:47

## 2022-05-01 RX ADMIN — ACETAMINOPHEN 1000 MG: 500 TABLET ORAL at 12:49

## 2022-05-01 RX ADMIN — QUETIAPINE FUMARATE 50 MG: 25 TABLET ORAL at 01:59

## 2022-05-01 RX ADMIN — CLONIDINE HYDROCHLORIDE 0.2 MG: 0.2 TABLET ORAL at 05:00

## 2022-05-01 RX ADMIN — DIAZEPAM 10 MG: 5 TABLET ORAL at 05:00

## 2022-05-01 RX ADMIN — DEXMEDETOMIDINE HYDROCHLORIDE 1.5 MCG/KG/HR: 4 INJECTION, SOLUTION INTRAVENOUS at 12:54

## 2022-05-01 RX ADMIN — CLONIDINE HYDROCHLORIDE 0.2 MG: 0.2 TABLET ORAL at 21:20

## 2022-05-01 RX ADMIN — DIAZEPAM 10 MG: 5 TABLET ORAL at 11:17

## 2022-05-01 RX ADMIN — GABAPENTIN 600 MG: 600 TABLET ORAL at 05:00

## 2022-05-01 RX ADMIN — ENOXAPARIN SODIUM 30 MG: 100 INJECTION SUBCUTANEOUS at 20:40

## 2022-05-01 RX ADMIN — ACETAMINOPHEN 1000 MG: 500 TABLET ORAL at 05:00

## 2022-05-01 RX ADMIN — DEXMEDETOMIDINE HYDROCHLORIDE 1.5 MCG/KG/HR: 4 INJECTION, SOLUTION INTRAVENOUS at 15:28

## 2022-05-01 RX ADMIN — OXYCODONE HYDROCHLORIDE 10 MG: 5 TABLET ORAL at 18:03

## 2022-05-01 RX ADMIN — Medication 10 MG: at 08:15

## 2022-05-01 RX ADMIN — DEXMEDETOMIDINE HYDROCHLORIDE 1.1 MCG/KG/HR: 4 INJECTION, SOLUTION INTRAVENOUS at 08:10

## 2022-05-01 RX ADMIN — FENTANYL CITRATE 50 MCG: 50 INJECTION INTRAMUSCULAR; INTRAVENOUS at 05:25

## 2022-05-01 RX ADMIN — FENTANYL CITRATE 50 MCG: 50 INJECTION INTRAMUSCULAR; INTRAVENOUS at 11:48

## 2022-05-01 RX ADMIN — OLANZAPINE 5 MG: 5 TABLET, FILM COATED ORAL at 21:20

## 2022-05-01 RX ADMIN — DOCUSATE SODIUM 50 MG AND SENNOSIDES 8.6 MG 1 TABLET: 8.6; 5 TABLET, FILM COATED ORAL at 20:39

## 2022-05-01 RX ADMIN — OXYCODONE HYDROCHLORIDE 10 MG: 5 TABLET ORAL at 11:17

## 2022-05-01 RX ADMIN — FOLIC ACID 1 MG: 1 TABLET ORAL at 08:15

## 2022-05-01 RX ADMIN — FAMOTIDINE 20 MG: 20 TABLET, FILM COATED ORAL at 20:39

## 2022-05-01 RX ADMIN — GABAPENTIN 600 MG: 600 TABLET ORAL at 20:39

## 2022-05-01 RX ADMIN — OXYCODONE HYDROCHLORIDE 10 MG: 5 TABLET ORAL at 23:38

## 2022-05-01 RX ADMIN — DIAZEPAM 10 MG: 5 TABLET ORAL at 23:38

## 2022-05-01 RX ADMIN — ERYTHROMYCIN: 5 OINTMENT OPHTHALMIC at 21:20

## 2022-05-01 RX ADMIN — FENTANYL CITRATE 50 MCG: 50 INJECTION INTRAMUSCULAR; INTRAVENOUS at 03:13

## 2022-05-01 RX ADMIN — QUETIAPINE FUMARATE 50 MG: 25 TABLET ORAL at 09:35

## 2022-05-01 RX ADMIN — ONDANSETRON 4 MG: 2 INJECTION INTRAMUSCULAR; INTRAVENOUS at 12:55

## 2022-05-01 RX ADMIN — ACETAMINOPHEN 1000 MG: 500 TABLET ORAL at 21:20

## 2022-05-01 RX ADMIN — GABAPENTIN 600 MG: 600 TABLET ORAL at 12:39

## 2022-05-01 ASSESSMENT — PAIN SCALES - GENERAL
PAINLEVEL_OUTOF10: 0
PAINLEVEL_OUTOF10: 0
PAINLEVEL_OUTOF10: 8
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
## 2022-05-01 NOTE — PLAN OF CARE
Problem: OXYGENATION/RESPIRATORY FUNCTION  Goal: Patient will maintain patent airway  4/30/2022 2215 by Christine Carreon RN  Outcome: Progressing  4/30/2022 1705 by Juanis Hardin RN  Outcome: Progressing  4/30/2022 0831 by Ame Gunn RCP  Outcome: Progressing  Goal: Patient will achieve/maintain normal respiratory rate/effort  Description: Respiratory rate and effort will be within normal limits for the patient  4/30/2022 2215 by Christine Carreon RN  Outcome: Progressing  4/30/2022 1705 by Juanis Hardin RN  Outcome: Progressing  4/30/2022 0831 by Ame Gunn RCP  Outcome: Progressing     Problem: Non-Violent Restraints  Goal: Removal from restraints as soon as assessed to be safe  4/30/2022 2215 by Christine Carreon RN  Outcome: Progressing  4/30/2022 1705 by Juanis Hardin RN  Outcome: Progressing  4/30/2022 0831 by Ame Gunn RCP  Outcome: Progressing  Goal: No harm/injury to patient while restraints in use  4/30/2022 2215 by Christine Carreon RN  Outcome: Progressing  4/30/2022 1705 by Juanis Hardin RN  Outcome: Progressing  4/30/2022 0831 by Ame Gunn RCP  Outcome: Progressing  Goal: Patient's dignity will be maintained  4/30/2022 2215 by Christine Carreon RN  Outcome: Progressing  4/30/2022 1705 by Juanis Hardin RN  Outcome: Progressing  4/30/2022 0831 by Ame Gunn RCP  Outcome: Progressing     Problem: Falls - Risk of:  Goal: Will remain free from falls  Description: Will remain free from falls  4/30/2022 2215 by Christine Carreon RN  Outcome: Progressing  4/30/2022 1705 by Juanis Hardin RN  Outcome: Progressing  4/30/2022 0831 by Ame Gunn RCP  Outcome: Progressing  Goal: Absence of physical injury  Description: Absence of physical injury  4/30/2022 2215 by Christine Carreon RN  Outcome: Progressing  4/30/2022 1705 by Juanis Hardin RN  Outcome: Progressing  4/30/2022 0831 by Ame Gunn RCP  Outcome: Progressing Problem: Nutrition  Goal: Optimal nutrition therapy  4/30/2022 2215 by Venita Her RN  Outcome: Progressing  4/30/2022 1705 by Yulissa Ward RN  Outcome: Progressing  4/30/2022 0831 by Prema Wood RCP  Outcome: Progressing

## 2022-05-01 NOTE — PLAN OF CARE
Problem: OXYGENATION/RESPIRATORY FUNCTION  Goal: Patient will maintain patent airway  Outcome: Progressing  Goal: Patient will achieve/maintain normal respiratory rate/effort  Description: Respiratory rate and effort will be within normal limits for the patient  Outcome: Progressing     Problem: SKIN INTEGRITY  Goal: Skin integrity is maintained or improved  Outcome: Progressing     Problem: MECHANICAL VENTILATION  Goal: Patient will maintain patent airway  Outcome: Progressing  Goal: Oral health is maintained or improved  Outcome: Progressing  Goal: ET tube will be managed safely  Outcome: Progressing  Goal: Ability to express needs and understand communication  Outcome: Progressing  Goal: Mobility/activity is maintained at optimum level for patient  Outcome: Progressing     Problem: Non-Violent Restraints  Goal: Removal from restraints as soon as assessed to be safe  Outcome: Progressing  Goal: No harm/injury to patient while restraints in use  Outcome: Progressing  Goal: Patient's dignity will be maintained  Outcome: Progressing     Problem: Skin Integrity:  Goal: Will show no infection signs and symptoms  Description: Will show no infection signs and symptoms  Outcome: Progressing  Goal: Absence of new skin breakdown  Description: Absence of new skin breakdown  Outcome: Progressing     Problem: Nutrition  Goal: Optimal nutrition therapy  Outcome: Progressing     Problem: Discharge Planning  Goal: Discharge to home or other facility with appropriate resources  Outcome: Progressing     Problem: Safety - Medical Restraint  Goal: Remains free of injury from restraints (Restraint for Interference with Medical Device)  Description: INTERVENTIONS:  1. Determine that other, less restrictive measures have been tried or would not be effective before applying the restraint  2. Evaluate the patient's condition at the time of restraint application  3. Inform patient/family regarding the reason for restraint  4.  Q2H: Monitor safety, psychosocial status, comfort, nutrition and hydration  Outcome: Progressing     Problem: ABCDS Injury Assessment  Goal: Absence of physical injury  Outcome: Progressing

## 2022-05-02 LAB
EKG ATRIAL RATE: 122 BPM
EKG ATRIAL RATE: 127 BPM
EKG ATRIAL RATE: 54 BPM
EKG P AXIS: 37 DEGREES
EKG P AXIS: 39 DEGREES
EKG P AXIS: 65 DEGREES
EKG P-R INTERVAL: 156 MS
EKG P-R INTERVAL: 162 MS
EKG P-R INTERVAL: 192 MS
EKG Q-T INTERVAL: 306 MS
EKG Q-T INTERVAL: 308 MS
EKG Q-T INTERVAL: 428 MS
EKG QRS DURATION: 104 MS
EKG QRS DURATION: 94 MS
EKG QRS DURATION: 96 MS
EKG QTC CALCULATION (BAZETT): 405 MS
EKG QTC CALCULATION (BAZETT): 438 MS
EKG QTC CALCULATION (BAZETT): 444 MS
EKG R AXIS: 12 DEGREES
EKG R AXIS: 18 DEGREES
EKG R AXIS: 44 DEGREES
EKG T AXIS: 33 DEGREES
EKG T AXIS: 47 DEGREES
EKG T AXIS: 60 DEGREES
EKG VENTRICULAR RATE: 122 BPM
EKG VENTRICULAR RATE: 127 BPM
EKG VENTRICULAR RATE: 54 BPM

## 2022-05-02 PROCEDURE — 94761 N-INVAS EAR/PLS OXIMETRY MLT: CPT

## 2022-05-02 PROCEDURE — 6370000000 HC RX 637 (ALT 250 FOR IP): Performed by: EMERGENCY MEDICINE

## 2022-05-02 PROCEDURE — 97110 THERAPEUTIC EXERCISES: CPT

## 2022-05-02 PROCEDURE — 6370000000 HC RX 637 (ALT 250 FOR IP): Performed by: NURSE PRACTITIONER

## 2022-05-02 PROCEDURE — 93010 ELECTROCARDIOGRAM REPORT: CPT | Performed by: INTERNAL MEDICINE

## 2022-05-02 PROCEDURE — 6360000002 HC RX W HCPCS: Performed by: STUDENT IN AN ORGANIZED HEALTH CARE EDUCATION/TRAINING PROGRAM

## 2022-05-02 PROCEDURE — 2500000003 HC RX 250 WO HCPCS: Performed by: STUDENT IN AN ORGANIZED HEALTH CARE EDUCATION/TRAINING PROGRAM

## 2022-05-02 PROCEDURE — 6370000000 HC RX 637 (ALT 250 FOR IP): Performed by: STUDENT IN AN ORGANIZED HEALTH CARE EDUCATION/TRAINING PROGRAM

## 2022-05-02 PROCEDURE — 2580000003 HC RX 258: Performed by: EMERGENCY MEDICINE

## 2022-05-02 PROCEDURE — 6360000002 HC RX W HCPCS: Performed by: EMERGENCY MEDICINE

## 2022-05-02 PROCEDURE — 93005 ELECTROCARDIOGRAM TRACING: CPT | Performed by: SURGERY

## 2022-05-02 PROCEDURE — 2000000000 HC ICU R&B

## 2022-05-02 PROCEDURE — 2700000000 HC OXYGEN THERAPY PER DAY

## 2022-05-02 RX ORDER — CHOLECALCIFEROL (VITAMIN D3) 125 MCG
5 CAPSULE ORAL NIGHTLY
Status: DISCONTINUED | OUTPATIENT
Start: 2022-05-02 | End: 2022-05-03

## 2022-05-02 RX ORDER — FENTANYL CITRATE 50 UG/ML
50 INJECTION, SOLUTION INTRAMUSCULAR; INTRAVENOUS
Status: DISCONTINUED | OUTPATIENT
Start: 2022-05-02 | End: 2022-05-10

## 2022-05-02 RX ORDER — CHOLECALCIFEROL (VITAMIN D3) 125 MCG
5 CAPSULE ORAL NIGHTLY PRN
Status: DISCONTINUED | OUTPATIENT
Start: 2022-05-02 | End: 2022-05-02

## 2022-05-02 RX ORDER — QUETIAPINE FUMARATE 100 MG/1
100 TABLET, FILM COATED ORAL EVERY 8 HOURS
Status: DISCONTINUED | OUTPATIENT
Start: 2022-05-02 | End: 2022-05-09

## 2022-05-02 RX ADMIN — DIAZEPAM 10 MG: 5 TABLET ORAL at 23:00

## 2022-05-02 RX ADMIN — GABAPENTIN 600 MG: 600 TABLET ORAL at 05:47

## 2022-05-02 RX ADMIN — OXYCODONE HYDROCHLORIDE 10 MG: 5 TABLET ORAL at 05:11

## 2022-05-02 RX ADMIN — SODIUM CHLORIDE, PRESERVATIVE FREE 10 ML: 5 INJECTION INTRAVENOUS at 20:17

## 2022-05-02 RX ADMIN — FENTANYL CITRATE 50 MCG: 50 INJECTION INTRAMUSCULAR; INTRAVENOUS at 04:52

## 2022-05-02 RX ADMIN — DIAZEPAM 10 MG: 5 TABLET ORAL at 05:11

## 2022-05-02 RX ADMIN — DEXMEDETOMIDINE HYDROCHLORIDE 1.3 MCG/KG/HR: 4 INJECTION, SOLUTION INTRAVENOUS at 05:20

## 2022-05-02 RX ADMIN — SODIUM CHLORIDE, PRESERVATIVE FREE 10 ML: 5 INJECTION INTRAVENOUS at 09:41

## 2022-05-02 RX ADMIN — ACETAMINOPHEN 1000 MG: 500 TABLET ORAL at 13:34

## 2022-05-02 RX ADMIN — OXYCODONE HYDROCHLORIDE 10 MG: 5 TABLET ORAL at 18:04

## 2022-05-02 RX ADMIN — QUETIAPINE FUMARATE 100 MG: 100 TABLET ORAL at 20:16

## 2022-05-02 RX ADMIN — ENOXAPARIN SODIUM 30 MG: 100 INJECTION SUBCUTANEOUS at 20:17

## 2022-05-02 RX ADMIN — GABAPENTIN 600 MG: 600 TABLET ORAL at 20:16

## 2022-05-02 RX ADMIN — FAMOTIDINE 20 MG: 20 TABLET, FILM COATED ORAL at 09:40

## 2022-05-02 RX ADMIN — Medication 5 MG: at 20:16

## 2022-05-02 RX ADMIN — Medication 10 MG: at 09:40

## 2022-05-02 RX ADMIN — FENTANYL CITRATE 50 MCG: 50 INJECTION, SOLUTION INTRAMUSCULAR; INTRAVENOUS at 13:03

## 2022-05-02 RX ADMIN — DEXMEDETOMIDINE HYDROCHLORIDE 1.3 MCG/KG/HR: 4 INJECTION, SOLUTION INTRAVENOUS at 08:06

## 2022-05-02 RX ADMIN — ACETAMINOPHEN 1000 MG: 500 TABLET ORAL at 23:00

## 2022-05-02 RX ADMIN — DEXMEDETOMIDINE HYDROCHLORIDE 1.2 MCG/KG/HR: 4 INJECTION, SOLUTION INTRAVENOUS at 15:41

## 2022-05-02 RX ADMIN — FAMOTIDINE 20 MG: 20 TABLET, FILM COATED ORAL at 20:16

## 2022-05-02 RX ADMIN — DEXMEDETOMIDINE HYDROCHLORIDE 1.1 MCG/KG/HR: 4 INJECTION, SOLUTION INTRAVENOUS at 01:20

## 2022-05-02 RX ADMIN — HALOPERIDOL LACTATE 5 MG: 5 INJECTION, SOLUTION INTRAMUSCULAR at 04:26

## 2022-05-02 RX ADMIN — ACETAMINOPHEN 1000 MG: 500 TABLET ORAL at 05:11

## 2022-05-02 RX ADMIN — OXYCODONE HYDROCHLORIDE 10 MG: 5 TABLET ORAL at 11:39

## 2022-05-02 RX ADMIN — QUETIAPINE FUMARATE 50 MG: 25 TABLET ORAL at 02:47

## 2022-05-02 RX ADMIN — FOLIC ACID 1 MG: 1 TABLET ORAL at 09:40

## 2022-05-02 RX ADMIN — DEXMEDETOMIDINE HYDROCHLORIDE 1 MCG/KG/HR: 4 INJECTION, SOLUTION INTRAVENOUS at 12:22

## 2022-05-02 RX ADMIN — CLONIDINE HYDROCHLORIDE 0.2 MG: 0.2 TABLET ORAL at 13:34

## 2022-05-02 RX ADMIN — ERYTHROMYCIN: 5 OINTMENT OPHTHALMIC at 20:17

## 2022-05-02 RX ADMIN — FENTANYL CITRATE 50 MCG: 50 INJECTION INTRAMUSCULAR; INTRAVENOUS at 03:44

## 2022-05-02 RX ADMIN — POLYETHYLENE GLYCOL 3350 17 G: 17 POWDER, FOR SOLUTION ORAL at 09:40

## 2022-05-02 RX ADMIN — QUETIAPINE FUMARATE 100 MG: 100 TABLET ORAL at 10:47

## 2022-05-02 RX ADMIN — DIAZEPAM 10 MG: 5 TABLET ORAL at 18:05

## 2022-05-02 RX ADMIN — OLANZAPINE 5 MG: 5 TABLET, FILM COATED ORAL at 20:16

## 2022-05-02 RX ADMIN — FENTANYL CITRATE 50 MCG: 50 INJECTION INTRAMUSCULAR; INTRAVENOUS at 03:11

## 2022-05-02 RX ADMIN — DIAZEPAM 10 MG: 5 TABLET ORAL at 11:39

## 2022-05-02 RX ADMIN — DOCUSATE SODIUM 50 MG AND SENNOSIDES 8.6 MG 1 TABLET: 8.6; 5 TABLET, FILM COATED ORAL at 09:40

## 2022-05-02 RX ADMIN — GABAPENTIN 600 MG: 600 TABLET ORAL at 13:34

## 2022-05-02 RX ADMIN — CLONIDINE HYDROCHLORIDE 0.2 MG: 0.2 TABLET ORAL at 20:16

## 2022-05-02 RX ADMIN — CLONIDINE HYDROCHLORIDE 0.2 MG: 0.2 TABLET ORAL at 05:47

## 2022-05-02 RX ADMIN — ENOXAPARIN SODIUM 30 MG: 100 INJECTION SUBCUTANEOUS at 09:40

## 2022-05-02 RX ADMIN — FENTANYL CITRATE 50 MCG: 50 INJECTION INTRAMUSCULAR; INTRAVENOUS at 05:43

## 2022-05-02 RX ADMIN — DEXMEDETOMIDINE HYDROCHLORIDE 1 MCG/KG/HR: 4 INJECTION, SOLUTION INTRAVENOUS at 20:16

## 2022-05-02 RX ADMIN — OXYCODONE HYDROCHLORIDE 10 MG: 5 TABLET ORAL at 23:00

## 2022-05-02 ASSESSMENT — PAIN SCALES - GENERAL
PAINLEVEL_OUTOF10: 0
PAINLEVEL_OUTOF10: 8
PAINLEVEL_OUTOF10: 0

## 2022-05-02 ASSESSMENT — PULMONARY FUNCTION TESTS
PIF_VALUE: 11

## 2022-05-02 ASSESSMENT — PAIN SCALES - WONG BAKER
WONGBAKER_NUMERICALRESPONSE: 0

## 2022-05-02 NOTE — PROGRESS NOTES
Physical Therapy  DATE: 2022  NAME: Catrachita Freeman  MRN: [id number]   : 1995    Discharge Recommendations: Further therapy recommended at discharge. Subjective: RN agreeable to ROM. Pt unresponsive upon PT arrival, intubated and sedated. Pt did not follow any commands but did open eyes with ROM on R side. Pt became increasingly agitated with ROM of R UE. RN present in room  Pain: CODY, vitals unchanged  Patient follows: No Commands  Is patient on ventilator: yes (trach collar)  Is patient on sedation: yes  Restrictions  Restrictions/Precautions  Restrictions/Precautions: Fall Risk,Seizure  Required Braces or Orthoses?: Yes  Required Braces or Orthoses  Cervical: c-collar (OK to sit up without restrictions; Thoracic fractures are chronic, no need for intervention or bracing.)  Position Activity Restriction  Other position/activity restrictions: s/p trach and peg 15; SBP <140    Therapeutic exercises:  L UE/LE PROM x15 reps in all joints and planes  R LE PROM x15 reps in all joints and planes  R UE AA/PROM x ~5 reps each (pt becoming increasingly agitated with RUE movment, RN present)  R UE soft wrist restraint doffed for PT and donned upon completion  FDS issued and donned to L LE, wear schedule posted on wall   B UE P/AAROM completed within C spine precautions    Goals  Short Term Goals  Short term goal 1: Perform bed mobility with SBA  Short term goal 2: Perform sit to stand transfer with Min A  Short term goal 3: Ambulate 100ft with appropriate AD and Min A  Short term goal 4: Demo Fair- dynamic standing balance to decrease risk of falls       Plan: Progress functional mobility as medically appropriate.    Time In: 926  Time Out:   Time Coded Minutes (treatment minutes): 15  Rehab Potential: fair  Treatments/week: 2-3x/wk    Yong Councilman, PTA

## 2022-05-02 NOTE — PROGRESS NOTES
Mani Tovar, RN Case Mgr updated on pt status and informed pt remains medically stable for discharge. Mani Tovar reports still waiting to [facility] and [facility].

## 2022-05-02 NOTE — CARE COORDINATION
Spoke to patients mother and updated her on LTACH. She asked if he qualifies for a SNF now. I explained that he will still qualify for an LTACH today but that can change quickly. I told her that I will put a list of  SNF's in the chart and she can ask the nurse for it when she arrives to the hospital.  Mother explains that she carpio not want the girlfriend seeing the list.      Alicia Hopkins to inquire about admission. Left voicemail for [facility]uent[facility] asking for a return call. Lou remains on Precedex drip along with trach mask @ 40% 8L    1600 Spoke to Sandra with Trauma, she is asking if the patient is able to go to a facility with propofol instead of Precedex.   Will check with the facilities in the AM
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
## 2022-05-02 NOTE — PROGRESS NOTES
[employer] [employer]  Occupational Therapy Not Seen Note    DATE: 2022    NAME: Jose Mendoza  MRN: [id number]   : 1995      Patient not seen this date for Occupational Therapy due to: Pt is not able to follow commands to complete functional tasks w/OT at this time.     Next Scheduled Treatment: 22    Electronically signed by WOLFGANG Moore on 2022 at 4:07 PM

## 2022-05-02 NOTE — PLAN OF CARE
Problem: OXYGENATION/RESPIRATORY FUNCTION  Goal: Patient will maintain patent airway  5/1/2022 2310 by Janette Blackwell RN  Outcome: Progressing  5/1/2022 1434 by Amish Ibrahim RN  Outcome: Progressing  Goal: Patient will achieve/maintain normal respiratory rate/effort  Description: Respiratory rate and effort will be within normal limits for the patient  5/1/2022 2310 by Janette Blackwell RN  Outcome: Progressing  5/1/2022 1434 by Amish Ibrahim RN  Outcome: Progressing     Problem: SKIN INTEGRITY  Goal: Skin integrity is maintained or improved  5/1/2022 2310 by Janette Blackwell RN  Outcome: Progressing  5/1/2022 1434 by Amish Ibrahim RN  Outcome: Progressing     Problem: MECHANICAL VENTILATION  Goal: Patient will maintain patent airway  5/1/2022 2310 by Janette Blackwell RN  Outcome: Progressing  5/1/2022 1434 by Amish Ibrahim RN  Outcome: Progressing  Goal: Oral health is maintained or improved  5/1/2022 2310 by Janette Blackwell RN  Outcome: Progressing  5/1/2022 1434 by Amish Ibrahim RN  Outcome: Progressing  Goal: ET tube will be managed safely  5/1/2022 2310 by Janette Blackwell RN  Outcome: Progressing  5/1/2022 1434 by Amish Ibrahim RN  Outcome: Progressing  Goal: Ability to express needs and understand communication  [id number] by Janette Blackwell RN  Outcome: Progressing  5/1/2022 1434 by Amish Ibrahim RN  Outcome: Progressing  Goal: Mobility/activity is maintained at optimum level for patient  5/1/2022 2310 by Janette Blackwell RN  Outcome: Progressing  5/1/2022 1434 by Amish Ibrahim RN  Outcome: Progressing     Problem: Non-Violent Restraints  Goal: Removal from restraints as soon as assessed to be safe  5/1/2022 2310 by Janette Blackwell RN  Outcome: Progressing  5/1/2022 1434 by Amish Ibrahim RN  Outcome: Progressing  Goal: No harm/injury to patient while restraints in use  5/1/2022 2310 by Janette Blackwell RN  Outcome: Progressing  5/1/2022 1434 by Amish Alexandria, RN  Outcome: Progressing  Goal: Patient's dignity will be maintained  5/1/2022 2310 by Jimbo Castillo RN  Outcome: Progressing  5/1/2022 1434 by Garret Prater RN  Outcome: Progressing     Problem: Skin Integrity:  Goal: Will show no infection signs and symptoms  Description: Will show no infection signs and symptoms  5/1/2022 2310 by Jimbo Castillo RN  Outcome: Progressing  5/1/2022 1434 by Garret Prater RN  Outcome: Progressing  Goal: Absence of new skin breakdown  Description: Absence of new skin breakdown  5/1/2022 2310 by Jimbo Castillo RN  Outcome: Progressing  5/1/2022 1434 by Garret Prater RN  Outcome: Progressing     Problem: Falls - Risk of:  Goal: Will remain free from falls  Description: Will remain free from falls  5/1/2022 2310 by Jibmo Castillo RN  Outcome: Progressing  5/1/2022 1434 by aGrret Prater RN  Outcome: Progressing  Goal: Absence of physical injury  Description: Absence of physical injury  5/1/2022 2310 by Jimbo Castillo RN  Outcome: Progressing  5/1/2022 1434 by Garret Prater RN  Outcome: Progressing     Problem: Nutrition  Goal: Optimal nutrition therapy  5/1/2022 2310 by Jimbo Castillo RN  Outcome: Progressing  5/1/2022 1434 by Garret Prater RN  Outcome: Progressing     Problem: Discharge Planning  Goal: Discharge to home or other facility with appropriate resources  5/1/2022 2310 by Jimbo Castillo RN  Outcome: Progressing  5/1/2022 1434 by Garret Prater RN  Outcome: Progressing     Problem: Safety - Medical Restraint  Goal: Remains free of injury from restraints (Restraint for Interference with Medical Device)  Description: INTERVENTIONS:  1. Determine that other, less restrictive measures have been tried or would not be effective before applying the restraint  2. Evaluate the patient's condition at the time of restraint application  3. Inform patient/family regarding the reason for restraint  4.  Q2H: Monitor safety, psychosocial status, comfort, nutrition and hydration  5/1/2022 2310 by Jimbo Castillo RN  Outcome: Progressing  5/1/2022 1434 by María De La Torre RN  Outcome: Progressing     Problem: Pain  Goal: Verbalizes/displays adequate comfort level or baseline comfort level  5/1/2022 2310 by Antonia Salazar RN  Outcome: Progressing  5/1/2022 1434 by María De La Torre RN  Outcome: Progressing     Problem: ABCDS Injury Assessment  Goal: Absence of physical injury  5/1/2022 2310 by Antonia Salazar RN  Outcome: Progressing  5/1/2022 1434 by María De La Torre RN  Outcome: Progressing

## 2022-05-02 NOTE — PLAN OF CARE
Problem: OXYGENATION/RESPIRATORY FUNCTION  Goal: Patient will maintain patent airway  5/2/2022 0744 by Oscar Resendiz RN  Outcome: Progressing  5/1/2022 2310 by Juan M Sweet RN  Outcome: Progressing  Goal: Patient will achieve/maintain normal respiratory rate/effort  Description: Respiratory rate and effort will be within normal limits for the patient  5/2/2022 0744 by Oscar Resendiz RN  Outcome: Progressing  5/1/2022 2310 by Juan M Sweet RN  Outcome: Progressing     Problem: SKIN INTEGRITY  Goal: Skin integrity is maintained or improved  5/2/2022 0744 by Oscar Resendiz RN  Outcome: Progressing  5/1/2022 2310 by Juan M Sweet RN  Outcome: Progressing     Problem: Non-Violent Restraints  Goal: Removal from restraints as soon as assessed to be safe  5/2/2022 0744 by Oscar Resendiz RN  Outcome: Progressing  5/1/2022 2310 by Juan M Sweet RN  Outcome: Progressing  Goal: No harm/injury to patient while restraints in use  5/2/2022 0744 by Oscar Resendiz RN  Outcome: Progressing  5/1/2022 2310 by Juan M Sweet RN  Outcome: Progressing  Goal: Patient's dignity will be maintained  5/2/2022 0744 by Oscar Resendiz RN  Outcome: Progressing  5/1/2022 2310 by Juan M Sweet RN  Outcome: Progressing     Problem: Skin Integrity:  Goal: Will show no infection signs and symptoms  Description: Will show no infection signs and symptoms  5/2/2022 0744 by Oscar Resendiz RN  Outcome: Progressing  5/1/2022 2310 by Juan M Sweet RN  Outcome: Progressing  Goal: Absence of new skin breakdown  Description: Absence of new skin breakdown  5/2/2022 0744 by Oscar Resendiz RN  Outcome: Progressing  5/1/2022 2310 by Jua nM Sweet RN  Outcome: Progressing     Problem: Falls - Risk of:  Goal: Will remain free from falls  Description: Will remain free from falls  5/2/2022 0744 by Oscar Resendiz RN  Outcome: Progressing  5/1/2022 2310 by Juan M Sweet RN  Outcome: Progressing  Goal: Absence of physical injury  Description: Absence of physical injury  5/2/2022 0744 by Serena Hitchcock RN  Outcome: Progressing  5/1/2022 2310 by Vinicio Tolbert RN  Outcome: Progressing     Problem: Nutrition  Goal: Optimal nutrition therapy  5/2/2022 0744 by Serena Hitchcock RN  Outcome: Progressing  5/1/2022 2310 by Vinicio Tolbert RN  Outcome: Progressing     Problem: MECHANICAL VENTILATION  Goal: Patient will maintain patent airway  5/2/2022 0744 by Serena Hitchcock RN  Outcome: Progressing  5/1/2022 2310 by Vinicio Tolbert RN  Outcome: Progressing  Goal: Oral health is maintained or improved  5/2/2022 0744 by Serena Hitchcock RN  Outcome: Progressing  5/1/2022 2310 by Vinicio Tolbert RN  Outcome: Progressing  Goal: ET tube will be managed safely  5/2/2022 0744 by Serena Hitchcock RN  Outcome: Progressing  5/1/2022 2310 by Vinicio Tolbert RN  Outcome: Progressing  Goal: Ability to express needs and understand communication  5/2/2022 0744 by Serena Hitchcock RN  Outcome: Progressing  5/1/2022 2310 by Vinicio Tolbert RN  Outcome: Progressing  Goal: Mobility/activity is maintained at optimum level for patient  5/2/2022 0744 by Serena Hitchcock RN  Outcome: Progressing  5/1/2022 2310 by Vinicio Tolbert RN  Outcome: Progressing     Problem: Discharge Planning  Goal: Discharge to home or other facility with appropriate resources  5/2/2022 0744 by Serena Hitchcock RN  Outcome: Progressing  5/1/2022 2310 by Vinicio Tolbert RN  Outcome: Progressing     Problem: Safety - Medical Restraint  Goal: Remains free of injury from restraints (Restraint for Interference with Medical Device)  Description: INTERVENTIONS:  1. Determine that other, less restrictive measures have been tried or would not be effective before applying the restraint  2. Evaluate the patient's condition at the time of restraint application  3.  Inform patient/family regarding the reason for restraint  4.  Q2H: Monitor safety, psychosocial status, comfort, nutrition and hydration  5/2/2022 0744 by Cristopher Grant RN  Outcome: Progressing  5/1/2022 2310 by Quita Bernal RN  Outcome: Progressing     Problem: Pain  Goal: Verbalizes/displays adequate comfort level or baseline comfort level  5/2/2022 0744 by Cristopher Grant RN  Outcome: Progressing  5/1/2022 2310 by Quita Bernal RN  Outcome: Progressing     Problem: ABCDS Injury Assessment  Goal: Absence of physical injury  5/2/2022 0744 by Cristopher Grant RN  Outcome: Progressing  5/1/2022 2310 by Quita Bernal RN  Outcome: Progressing

## 2022-05-02 NOTE — PLAN OF CARE
PROVIDE ADEQUATE OXYGENATION WITH ACCEPTABLE SP02/ABG'S    [x]  IDENTIFY APPROPRIATE OXYGEN THERAPY  [x]   MONITOR SP02/ABG'S AS NEEDED   [x]   PATIENT EDUCATION AS NEEDED     Problem: OXYGENATION/RESPIRATORY FUNCTION  Goal: Patient will maintain patent airway  Outcome: Ongoing  Goal: Patient will achieve/maintain normal respiratory rate/effort  Respiratory rate and effort will be within normal limits for the patient  Outcome: Ongoing    Problem: SURGICAL AIRWAY  Goal: Patient will maintain patent airway  Outcome: Ongoing  Goal: Oral health is maintained or improved  Outcome: Ongoing  Goal: Trach tube will be managed safely  Outcome: Ongoing  Goal: Ability to express needs and understand communication  Outcome: Ongoing  Goal: Mobility/activity is maintained at optimum level for patient  Outcome: Ongoing    Problem: ASPIRATION PRECAUTIONS  Goal: Patients risk of aspiration is minimized  Outcome: Ongoing    Problem: SKIN INTEGRITY  Goal: Skin integrity is maintained or improved  Outcome: Ongoing

## 2022-05-03 LAB
ANION GAP SERPL CALCULATED.3IONS-SCNC: 7 MMOL/L (ref 9–17)
BUN BLDV-MCNC: 32 MG/DL (ref 6–20)
CALCIUM SERPL-MCNC: 9.6 MG/DL (ref 8.6–10.4)
CHLORIDE BLD-SCNC: 104 MMOL/L (ref 98–107)
CO2: 29 MMOL/L (ref 20–31)
CREAT SERPL-MCNC: 0.86 MG/DL (ref 0.7–1.2)
EKG ATRIAL RATE: 94 BPM
EKG P AXIS: 38 DEGREES
EKG P-R INTERVAL: 176 MS
EKG Q-T INTERVAL: 374 MS
EKG QRS DURATION: 102 MS
EKG QTC CALCULATION (BAZETT): 467 MS
EKG R AXIS: 1 DEGREES
EKG T AXIS: 59 DEGREES
EKG VENTRICULAR RATE: 94 BPM
GFR AFRICAN AMERICAN: >60 ML/MIN
GFR NON-AFRICAN AMERICAN: >60 ML/MIN
GFR SERPL CREATININE-BSD FRML MDRD: ABNORMAL ML/MIN/{1.73_M2}
GLUCOSE BLD-MCNC: 142 MG/DL (ref 70–99)
HCT VFR BLD CALC: 34.8 % (ref 40.7–50.3)
HEMOGLOBIN: 11.5 G/DL (ref 13–17)
MCH RBC QN AUTO: 31.7 PG (ref 25.2–33.5)
MCHC RBC AUTO-ENTMCNC: 33 G/DL (ref 28.4–34.8)
MCV RBC AUTO: 95.9 FL (ref 82.6–102.9)
NRBC AUTOMATED: 0 PER 100 WBC
PDW BLD-RTO: 11.9 % (ref 11.8–14.4)
PLATELET # BLD: 422 K/UL (ref 138–453)
PMV BLD AUTO: 9.5 FL (ref 8.1–13.5)
POTASSIUM SERPL-SCNC: 4.2 MMOL/L (ref 3.7–5.3)
RBC # BLD: 3.63 M/UL (ref 4.21–5.77)
SODIUM BLD-SCNC: 140 MMOL/L (ref 135–144)
WBC # BLD: 7.1 K/UL (ref 3.5–11.3)

## 2022-05-03 PROCEDURE — 6370000000 HC RX 637 (ALT 250 FOR IP): Performed by: EMERGENCY MEDICINE

## 2022-05-03 PROCEDURE — 94681 O2 UPTK CO2 OUTP % O2 XTRC: CPT

## 2022-05-03 PROCEDURE — 51798 US URINE CAPACITY MEASURE: CPT

## 2022-05-03 PROCEDURE — 85027 COMPLETE CBC AUTOMATED: CPT

## 2022-05-03 PROCEDURE — 93005 ELECTROCARDIOGRAM TRACING: CPT | Performed by: SURGERY

## 2022-05-03 PROCEDURE — 93010 ELECTROCARDIOGRAM REPORT: CPT | Performed by: INTERNAL MEDICINE

## 2022-05-03 PROCEDURE — 6370000000 HC RX 637 (ALT 250 FOR IP): Performed by: NURSE PRACTITIONER

## 2022-05-03 PROCEDURE — 94761 N-INVAS EAR/PLS OXIMETRY MLT: CPT

## 2022-05-03 PROCEDURE — 2700000000 HC OXYGEN THERAPY PER DAY

## 2022-05-03 PROCEDURE — 6360000002 HC RX W HCPCS: Performed by: STUDENT IN AN ORGANIZED HEALTH CARE EDUCATION/TRAINING PROGRAM

## 2022-05-03 PROCEDURE — 6360000002 HC RX W HCPCS: Performed by: EMERGENCY MEDICINE

## 2022-05-03 PROCEDURE — 2580000003 HC RX 258: Performed by: EMERGENCY MEDICINE

## 2022-05-03 PROCEDURE — 97530 THERAPEUTIC ACTIVITIES: CPT

## 2022-05-03 PROCEDURE — 2000000000 HC ICU R&B

## 2022-05-03 PROCEDURE — 94003 VENT MGMT INPAT SUBQ DAY: CPT

## 2022-05-03 PROCEDURE — 97535 SELF CARE MNGMENT TRAINING: CPT

## 2022-05-03 PROCEDURE — 97110 THERAPEUTIC EXERCISES: CPT

## 2022-05-03 PROCEDURE — 2500000003 HC RX 250 WO HCPCS: Performed by: STUDENT IN AN ORGANIZED HEALTH CARE EDUCATION/TRAINING PROGRAM

## 2022-05-03 PROCEDURE — 36415 COLL VENOUS BLD VENIPUNCTURE: CPT

## 2022-05-03 PROCEDURE — 51701 INSERT BLADDER CATHETER: CPT

## 2022-05-03 PROCEDURE — 6370000000 HC RX 637 (ALT 250 FOR IP): Performed by: STUDENT IN AN ORGANIZED HEALTH CARE EDUCATION/TRAINING PROGRAM

## 2022-05-03 PROCEDURE — 80048 BASIC METABOLIC PNL TOTAL CA: CPT

## 2022-05-03 RX ORDER — CHOLECALCIFEROL (VITAMIN D3) 125 MCG
10 CAPSULE ORAL NIGHTLY
Status: DISCONTINUED | OUTPATIENT
Start: 2022-05-03 | End: 2022-05-12 | Stop reason: HOSPADM

## 2022-05-03 RX ADMIN — DEXMEDETOMIDINE HYDROCHLORIDE 0.7 MCG/KG/HR: 4 INJECTION, SOLUTION INTRAVENOUS at 11:20

## 2022-05-03 RX ADMIN — QUETIAPINE FUMARATE 100 MG: 100 TABLET ORAL at 11:22

## 2022-05-03 RX ADMIN — DEXMEDETOMIDINE HYDROCHLORIDE 0.7 MCG/KG/HR: 4 INJECTION, SOLUTION INTRAVENOUS at 16:46

## 2022-05-03 RX ADMIN — OXYCODONE HYDROCHLORIDE 10 MG: 5 TABLET ORAL at 05:14

## 2022-05-03 RX ADMIN — DIAZEPAM 10 MG: 5 TABLET ORAL at 18:00

## 2022-05-03 RX ADMIN — OLANZAPINE 5 MG: 5 TABLET, FILM COATED ORAL at 20:15

## 2022-05-03 RX ADMIN — OXYCODONE HYDROCHLORIDE 10 MG: 5 TABLET ORAL at 18:00

## 2022-05-03 RX ADMIN — OXYCODONE HYDROCHLORIDE 10 MG: 5 TABLET ORAL at 11:21

## 2022-05-03 RX ADMIN — CLONIDINE HYDROCHLORIDE 0.2 MG: 0.2 TABLET ORAL at 05:13

## 2022-05-03 RX ADMIN — SODIUM CHLORIDE, PRESERVATIVE FREE 10 ML: 5 INJECTION INTRAVENOUS at 20:12

## 2022-05-03 RX ADMIN — CLONIDINE HYDROCHLORIDE 0.2 MG: 0.2 TABLET ORAL at 22:33

## 2022-05-03 RX ADMIN — QUETIAPINE FUMARATE 100 MG: 100 TABLET ORAL at 04:29

## 2022-05-03 RX ADMIN — CLONIDINE HYDROCHLORIDE 0.2 MG: 0.2 TABLET ORAL at 14:20

## 2022-05-03 RX ADMIN — GABAPENTIN 600 MG: 600 TABLET ORAL at 04:50

## 2022-05-03 RX ADMIN — ENOXAPARIN SODIUM 30 MG: 100 INJECTION SUBCUTANEOUS at 09:50

## 2022-05-03 RX ADMIN — DIAZEPAM 10 MG: 5 TABLET ORAL at 05:13

## 2022-05-03 RX ADMIN — FAMOTIDINE 20 MG: 20 TABLET, FILM COATED ORAL at 08:52

## 2022-05-03 RX ADMIN — FAMOTIDINE 20 MG: 20 TABLET, FILM COATED ORAL at 20:15

## 2022-05-03 RX ADMIN — FENTANYL CITRATE 50 MCG: 50 INJECTION, SOLUTION INTRAMUSCULAR; INTRAVENOUS at 20:03

## 2022-05-03 RX ADMIN — QUETIAPINE FUMARATE 100 MG: 100 TABLET ORAL at 18:00

## 2022-05-03 RX ADMIN — DEXMEDETOMIDINE HYDROCHLORIDE 1 MCG/KG/HR: 4 INJECTION, SOLUTION INTRAVENOUS at 21:31

## 2022-05-03 RX ADMIN — GABAPENTIN 600 MG: 600 TABLET ORAL at 14:20

## 2022-05-03 RX ADMIN — SODIUM CHLORIDE, PRESERVATIVE FREE 10 ML: 5 INJECTION INTRAVENOUS at 08:26

## 2022-05-03 RX ADMIN — FOLIC ACID 1 MG: 1 TABLET ORAL at 08:52

## 2022-05-03 RX ADMIN — DIAZEPAM 10 MG: 5 TABLET ORAL at 23:54

## 2022-05-03 RX ADMIN — ERYTHROMYCIN: 5 OINTMENT OPHTHALMIC at 20:16

## 2022-05-03 RX ADMIN — DEXMEDETOMIDINE HYDROCHLORIDE 0.8 MCG/KG/HR: 4 INJECTION, SOLUTION INTRAVENOUS at 06:45

## 2022-05-03 RX ADMIN — FENTANYL CITRATE 50 MCG: 50 INJECTION, SOLUTION INTRAMUSCULAR; INTRAVENOUS at 23:27

## 2022-05-03 RX ADMIN — OXYCODONE HYDROCHLORIDE 10 MG: 5 TABLET ORAL at 23:54

## 2022-05-03 RX ADMIN — GABAPENTIN 600 MG: 600 TABLET ORAL at 21:39

## 2022-05-03 RX ADMIN — DEXMEDETOMIDINE HYDROCHLORIDE 0.9 MCG/KG/HR: 4 INJECTION, SOLUTION INTRAVENOUS at 00:30

## 2022-05-03 RX ADMIN — ACETAMINOPHEN 1000 MG: 500 TABLET ORAL at 21:40

## 2022-05-03 RX ADMIN — ACETAMINOPHEN 1000 MG: 500 TABLET ORAL at 05:14

## 2022-05-03 RX ADMIN — ACETAMINOPHEN 1000 MG: 500 TABLET ORAL at 14:20

## 2022-05-03 RX ADMIN — ENOXAPARIN SODIUM 30 MG: 100 INJECTION SUBCUTANEOUS at 20:54

## 2022-05-03 RX ADMIN — Medication 10 MG: at 20:15

## 2022-05-03 RX ADMIN — DIAZEPAM 10 MG: 5 TABLET ORAL at 11:21

## 2022-05-03 ASSESSMENT — PAIN SCALES - WONG BAKER

## 2022-05-03 ASSESSMENT — PULMONARY FUNCTION TESTS
PIF_VALUE: 10
PIF_VALUE: 10
PIF_VALUE: 11
PIF_VALUE: 10
PIF_VALUE: 10
PIF_VALUE: 11
PIF_VALUE: 11
PIF_VALUE: 10
PIF_VALUE: 10

## 2022-05-03 ASSESSMENT — PAIN SCALES - GENERAL
PAINLEVEL_OUTOF10: 0

## 2022-05-03 NOTE — PLAN OF CARE
Problem: OXYGENATION/RESPIRATORY FUNCTION  Goal: Patient will maintain patent airway  5/3/2022 1102 by Kleber Monae RCP  Outcome: Progressing  5/3/2022 0916 by Ace Flores RN  Outcome: Progressing  5/2/2022 2152 by Enmanuel Bansal RN  Outcome: Progressing  Goal: Patient will achieve/maintain normal respiratory rate/effort  Description: Respiratory rate and effort will be within normal limits for the patient  5/3/2022 1102 by Kleber Monae RCP  Outcome: Progressing  5/3/2022 0916 by Ace Flores RN  Outcome: Progressing  5/2/2022 2152 by Enmanuel Bansal RN  Outcome: Progressing     Problem: SKIN INTEGRITY  Goal: Skin integrity is maintained or improved  5/3/2022 1102 by Kleber Monae RCP  Outcome: Progressing  5/3/2022 0916 by Ace Flores RN  Outcome: Progressing  5/2/2022 2152 by Enmanuel Bansal RN  Outcome: Progressing     Problem: MECHANICAL VENTILATION  Goal: Patient will maintain patent airway  5/3/2022 1102 by Kleber Monae RCP  Outcome: Progressing  5/3/2022 0916 by Ace Flores RN  Outcome: Progressing  5/2/2022 2152 by Enmanuel Bansal RN  Outcome: Progressing  Goal: Oral health is maintained or improved  5/3/2022 1102 by Kleber Monae RCP  Outcome: Progressing  5/3/2022 0916 by Ace Flores RN  Outcome: Progressing  5/2/2022 2152 by Enmanuel Bansal RN  Outcome: Progressing  Goal: ET tube will be managed safely  5/3/2022 1102 by Kleber Monae RCP  Outcome: Progressing  5/3/2022 0916 by Ace Flores RN  Outcome: Progressing  5/2/2022 2152 by Enmanuel Bansal RN  Outcome: Progressing  Goal: Ability to express needs and understand communication  5/3/2022 1102 by Kleber Monae RCP  Outcome: Progressing  5/3/2022 0916 by Ace Flores RN  Outcome: Progressing  5/2/2022 2152 by Enmanuel Bansal RN  Outcome: Progressing  Goal: Mobility/activity is maintained at optimum level for patient  5/3/2022 1102 by Kleber Monae RCP  Outcome: Progressing  5/3/2022 0916 by Ace Flores RN  Outcome: Progressing  5/2/2022 2152 by Tracy Nye RN  Outcome: Progressing     Problem: Skin Integrity:  Goal: Will show no infection signs and symptoms  Description: Will show no infection signs and symptoms  5/3/2022 1102 by Rockwell Hatchet, RCP  Outcome: Progressing  5/3/2022 0916 by Chikis Bales RN  Outcome: Progressing  5/2/2022 2152 by Tracy Nye RN  Outcome: Progressing  Goal: Absence of new skin breakdown  Description: Absence of new skin breakdown  5/3/2022 1102 by Rockwell Hatchet, RCP  Outcome: Progressing  5/3/2022 0916 by Chikis Bales RN  Outcome: Progressing  5/2/2022 2152 by Tracy Nye RN  Outcome: Progressing

## 2022-05-03 NOTE — PLAN OF CARE
Problem: OXYGENATION/RESPIRATORY FUNCTION  Goal: Patient will maintain patent airway  5/3/2022 0916 by Yvan Jackson RN  Outcome: Progressing  5/2/2022 2152 by Radha Soto RN  Outcome: Progressing  5/2/2022 2020 by KELSEA SantanaP  Outcome: Progressing  5/2/2022 2019 by Ruslan Kitchen RCP  Outcome: Progressing  Goal: Patient will achieve/maintain normal respiratory rate/effort  Description: Respiratory rate and effort will be within normal limits for the patient  5/3/2022 0916 by Yvan Jackson RN  Outcome: Progressing  5/2/2022 2152 by Radha Soto RN  Outcome: Progressing  5/2/2022 2020 by Ruslan Kitchen RCP  Outcome: Progressing  5/2/2022 2019 by Ruslan Kitchen RCLOUIE  Outcome: Progressing     Problem: SKIN INTEGRITY  Goal: Skin integrity is maintained or improved  5/3/2022 0916 by Yvan Jackson RN  Outcome: Progressing  5/2/2022 2152 by Radha Soto RN  Outcome: Progressing  5/2/2022 2020 by Ruslan Kitchen RCP  Outcome: Progressing  5/2/2022 2019 by Ruslan Kitchen RCP  Outcome: Progressing     Problem: MECHANICAL VENTILATION  Goal: Patient will maintain patent airway  5/3/2022 0916 by Yvan Jackson RN  Outcome: Progressing  5/2/2022 2152 by Radha Soto RN  Outcome: Progressing  5/2/2022 2020 by Ruslan Kitchen RCP  Outcome: Progressing  5/2/2022 2019 by Ruslan Kitchen RCP  Outcome: Progressing  Goal: Oral health is maintained or improved  5/3/2022 0916 by Yvan Jackson RN  Outcome: Progressing  5/2/2022 2152 by Radha Soto RN  Outcome: Progressing  5/2/2022 2020 by Ruslan Kitchen RCP  Outcome: Progressing  5/2/2022 2019 by Ruslan Kitchen RCP  Outcome: Progressing  Goal: ET tube will be managed safely  5/3/2022 0916 by Yvan Jackson RN  Outcome: Progressing  5/2/2022 2152 by Radha Soto RN  Outcome: Progressing  5/2/2022 2020 by Ruslan Kitchen RCP  Outcome: Progressing  5/2/2022 2019 by Ruslan Kitchen RCP  Outcome: Progressing  Goal: Ability to express needs and understand communication  5/3/2022 0916 by Kristen Newman RN  Outcome: Progressing  5/2/2022 2152 by Maria Del Rosario Bryan RN  Outcome: Progressing  5/2/2022 2020 by Fang Restrepo RCP  Outcome: Progressing  5/2/2022 2019 by Fang Restrepo RCP  Outcome: Progressing  Goal: Mobility/activity is maintained at optimum level for patient  5/3/2022 0916 by Kristen Newman RN  Outcome: Progressing  5/2/2022 2152 by Maria Del Rosario Bryan RN  Outcome: Progressing  5/2/2022 2020 by Fang Restrepo RCP  Outcome: Progressing  5/2/2022 2019 by Fang Restrepo RCP  Outcome: Progressing     Problem: Non-Violent Restraints  Goal: Removal from restraints as soon as assessed to be safe  5/3/2022 0916 by Kristen Newman RN  Outcome: Progressing  5/2/2022 2152 by Maria Del Rosario Bryan RN  Outcome: Progressing  Goal: No harm/injury to patient while restraints in use  5/3/2022 0916 by Kristen Newman RN  Outcome: Progressing  5/2/2022 2152 by Maria Del Rosario Bryan RN  Outcome: Progressing  Goal: Patient's dignity will be maintained  5/3/2022 0916 by Kritsen Newman RN  Outcome: Progressing  5/2/2022 2152 by Maria Del Rosario Bryan RN  Outcome: Progressing     Problem: Skin Integrity:  Goal: Will show no infection signs and symptoms  Description: Will show no infection signs and symptoms  5/3/2022 0916 by Kristen Newman RN  Outcome: Progressing  5/2/2022 2152 by Maria Del Rosario Bryan RN  Outcome: Progressing  Goal: Absence of new skin breakdown  Description: Absence of new skin breakdown  5/3/2022 0916 by Kristen Newman RN  Outcome: Progressing  5/2/2022 2152 by Maria Del Rosario Bryan RN  Outcome: Progressing     Problem: Falls - Risk of:  Goal: Will remain free from falls  Description: Will remain free from falls  5/3/2022 0916 by Kristen Newman RN  Outcome: Progressing  5/2/2022 2152 by Maria Del Rosario Bryan RN  Outcome: Progressing  Goal: Absence of physical injury  Description: Absence of physical injury  5/3/2022 0916 by Kristen Newman RN  Outcome: Progressing  5/2/2022 2152 by Adama Patricia RN  Outcome: Progressing     Problem: Nutrition  Goal: Optimal nutrition therapy  5/3/2022 0916 by Nas Mantilla RN  Outcome: Progressing  5/2/2022 2152 by Adama Patricia RN  Outcome: Progressing     Problem: Discharge Planning  Goal: Discharge to home or other facility with appropriate resources  5/3/2022 0916 by Nas Mantilla RN  Outcome: Progressing  5/2/2022 2152 by Adama Patricia RN  Outcome: Progressing     Problem: Safety - Medical Restraint  Goal: Remains free of injury from restraints (Restraint for Interference with Medical Device)  Description: INTERVENTIONS:  1. Determine that other, less restrictive measures have been tried or would not be effective before applying the restraint  2. Evaluate the patient's condition at the time of restraint application  3. Inform patient/family regarding the reason for restraint  4.  Q2H: Monitor safety, psychosocial status, comfort, nutrition and hydration  5/3/2022 0916 by Nas Mantilla RN  Outcome: Progressing  5/2/2022 2152 by Adama Patricia RN  Outcome: Progressing     Problem: Pain  Goal: Verbalizes/displays adequate comfort level or baseline comfort level  5/3/2022 0916 by Nas Mantilla RN  Outcome: Progressing  5/2/2022 2152 by Adama Patricia RN  Outcome: Progressing     Problem: ABCDS Injury Assessment  Goal: Absence of physical injury  5/3/2022 0916 by Nas Mantilla RN  Outcome: Progressing  5/2/2022 2152 by Adama Patricia RN  Outcome: Progressing

## 2022-05-03 NOTE — CARE COORDINATION
Called Merlene at [facility] to see if patient could admit with propofol. Left Voicemail  [phone]    Guillermo Blas at [facility] and inquired  She states that the patient  is unable to admit on any IV medications. Will have to have 02 at 6L or less     [place] [address] at 40% 10[address]     [address] patients  at [address] [facility]. Left voicemail asking for some assistance in finding a LTACH for patient    Called on the following CARF facilities to inquire   (CARF facilities are rehab facilities not LTACHs)    1. [facility] [phone]  2. [facility] [phone] they can accept any IV they can accept with some respiratory issues. 1215 Received a call form the patient's mother. I have updated her on transition plan. She states understanding that as long as he is on IV medications that he will not be able to admit to  An ARU    [address] at [facility] .

## 2022-05-03 NOTE — PROGRESS NOTES
Physical Therapy  Facility/Department: [facility] CAR 1  Physical Therapy Daily Treatment Note    Name: Catrachita Freeman  : 1995  MRN: [id number]  Date of Service: 5/3/2022    Discharge Recommendations:  Patient would benefit from continued therapy after discharge   PT Equipment Recommendations  Equipment Needed: No      Assessment   Body Structures, Functions, Activity Limitations Requiring Skilled Therapeutic Intervention: Decreased functional mobility ; Decreased balance;Decreased endurance;Decreased strength;Decreased ADL status; Decreased posture;Decreased cognition;Decreased safe awareness  Assessment: Pt required MAX A x2 for bed mobility and sit to stand transfer with RW. Limited by weakness, decreased balance and poor trunk control. Recommend aggressive PT after d/c to address deficits  Therapy Prognosis: Good  Activity Tolerance  Activity Tolerance: Patient tolerated treatment well;Patient limited by endurance;Treatment limited secondary to decreased cognition     Plan   Plan  Plan: 2-3 times per week  Current Treatment Recommendations: ROM,Strengthening,Patient/Caregiver education & training,Functional mobility training  Safety Devices  Type of Devices: Nurse notified,Call light within reach,Gait belt,Left in bed,Bed alarm in place  Restraints  Restraints Initially in Place: No (Pt has B wrist restraints donned, however not in use when girlfriend is at bedside)     Restrictions  Restrictions/Precautions  Restrictions/Precautions: Fall Risk,Seizure  Required Braces or Orthoses?: Yes  Required Braces or Orthoses  Cervical: c-collar  Position Activity Restriction  Other position/activity restrictions: s/p trach and peg 4/15; SBP <140     Subjective   General  Patient assessed for rehabilitation services?: Yes  Response To Previous Treatment: Patient with no complaints from previous session.   Family / Caregiver Present: Yes (girlfriend)  Follows Commands: Within Functional Limits (follows most commands with increased time to process)  Subjective  Subjective: Pt resting in bed upon arrival with girlfriend at bedside, somewhat restless but cooperative throughout   Cognition   Orientation  Overall Orientation Status: Impaired  Orientation Level: Oriented to place;Oriented to person;Disoriented to time;Disoriented to situation (oriented to being in a hospital in [place])     Objective      Bed mobility  Rolling to Left: Maximum assistance;2 Person assistance  Supine to Sit: Maximum assistance;2 Person assistance  Sit to Supine: 2 Person assistance;Maximum assistance  Scootin Person assistance;Maximal assistance  Transfers  Sit to Stand: Maximum Assistance;2 Person Assistance  Stand to sit: Maximum Assistance;2 Person Assistance  Comment: Sit to stand x 1 trial with RW, requiring MAX Ax 2. Ambulation  More Ambulation?: No     Balance  Posture: Fair  Sitting - Static: Fair;-  Sitting - Dynamic: Poor;+ (Pt sat EOB x ~10 min requiring MOD-MAX A throughout due to post and L lateral lean. Postural cues given throughout)  Standing - Static: Poor (Pt stood with RW x 1 min, requiring MAX A x2 throughout due to significnat trunk flex and L knee buckling.  Postural cues given throughout with poor carryover)   Exercise  R LE AROM in all planes x 10  L LE PROM in all planes x 10  L gastroc stretch 2 x 20 sec  Donned FDS to L    AM-PAC Score  AM-PAC Inpatient Mobility Raw Score : 9 (22)  AM-PAC Inpatient T-Scale Score : 30.55 (22)  Mobility Inpatient CMS 0-100% Score: 81.38 (22)  Mobility Inpatient CMS G-Code Modifier : CM (22)          Goals  Short Term Goals  Time Frame for Short term goals: 14 visits  Short term goal 1: Perform bed mobility with SBA  Short term goal 2: Perform sit to stand transfer with Min A  Short term goal 3: Ambulate 100ft with appropriate AD and Min A  Short term goal 4: Demo Fair- dynamic standing balance to decrease risk of falls       Therapy Time   Individual Concurrent Group Co-treatment   Time In 1241         Time Out 1522         Minutes 40         Timed Code Treatment Minutes: [address], [facility]

## 2022-05-03 NOTE — PLAN OF CARE
Problem: OXYGENATION/RESPIRATORY FUNCTION  Goal: Patient will maintain patent airway  5/2/2022 2152 by Verónica Nova RN  Outcome: Progressing  5/2/2022 2020 by Madie Quiroz RCP  Outcome: Progressing  5/2/2022 2019 by Madie Quiroz RCP  Outcome: Progressing  Goal: Patient will achieve/maintain normal respiratory rate/effort  Description: Respiratory rate and effort will be within normal limits for the patient  5/2/2022 2152 by Verónica Nova RN  Outcome: Progressing  5/2/2022 2020 by Madie Quiroz RCP  Outcome: Progressing  5/2/2022 2019 by Madie Quiroz RCP  Outcome: Progressing     Problem: SKIN INTEGRITY  Goal: Skin integrity is maintained or improved  5/2/2022 2152 by Verónica Nova RN  Outcome: Progressing  5/2/2022 2020 by Madie Quiroz RCP  Outcome: Progressing  5/2/2022 2019 by Madie Quiroz RCP  Outcome: Progressing     Problem: MECHANICAL VENTILATION  Goal: Patient will maintain patent airway  5/2/2022 2152 by Verónica Nova RN  Outcome: Progressing  5/2/2022 2020 by Madie Quiroz RCP  Outcome: Progressing  5/2/2022 2019 by Madie Quiroz RCP  Outcome: Progressing  Goal: Oral health is maintained or improved  5/2/2022 2152 by Verónica Nova RN  Outcome: Progressing  5/2/2022 2020 by Madie Quiroz RCP  Outcome: Progressing  5/2/2022 2019 by Madie Quiroz RCP  Outcome: Progressing  Goal: ET tube will be managed safely  5/2/2022 2152 by Verónica Nova RN  Outcome: Progressing  5/2/2022 2020 by Madie Quiroz RCP  Outcome: Progressing  5/2/2022 2019 by Madie Quiroz RCP  Outcome: Progressing  Goal: Ability to express needs and understand communication  5/2/2022 2152 by Verónica Nova RN  Outcome: Progressing  5/2/2022 2020 by Madie Quiroz RCP  Outcome: Progressing  5/2/2022 2019 by Madie Quiroz RCP  Outcome: Progressing  Goal: Mobility/activity is maintained at optimum level for patient  5/2/2022 2152 by Verónica Nova RN  Outcome: Progressing  5/2/2022 2020 by Michael Manzo RCP  Outcome: Progressing  5/2/2022 2019 by Michael Manzo RCP  Outcome: Progressing     Problem: Non-Violent Restraints  Goal: Removal from restraints as soon as assessed to be safe  Outcome: Progressing  Goal: No harm/injury to patient while restraints in use  Outcome: Progressing  Goal: Patient's dignity will be maintained  Outcome: Progressing     Problem: Skin Integrity:  Goal: Will show no infection signs and symptoms  Description: Will show no infection signs and symptoms  Outcome: Progressing  Goal: Absence of new skin breakdown  Description: Absence of new skin breakdown  Outcome: Progressing     Problem: Falls - Risk of:  Goal: Will remain free from falls  Description: Will remain free from falls  Outcome: Progressing  Goal: Absence of physical injury  Description: Absence of physical injury  Outcome: Progressing     Problem: Nutrition  Goal: Optimal nutrition therapy  Outcome: Progressing     Problem: Discharge Planning  Goal: Discharge to home or other facility with appropriate resources  Outcome: Progressing     Problem: Safety - Medical Restraint  Goal: Remains free of injury from restraints (Restraint for Interference with Medical Device)  Description: INTERVENTIONS:  1. Determine that other, less restrictive measures have been tried or would not be effective before applying the restraint  2. Evaluate the patient's condition at the time of restraint application  3. Inform patient/family regarding the reason for restraint  4.  Q2H: Monitor safety, psychosocial status, comfort, nutrition and hydration  Outcome: Progressing     Problem: Pain  Goal: Verbalizes/displays adequate comfort level or baseline comfort level  Outcome: Progressing     Problem: ABCDS Injury Assessment  Goal: Absence of physical injury  Outcome: Progressing

## 2022-05-03 NOTE — PLAN OF CARE
Problem: OXYGENATION/RESPIRATORY FUNCTION  Goal: Patient will maintain patent airway  5/2/2022 2019 by Geovanna Anglin RCP  Outcome: Progressing     Problem: OXYGENATION/RESPIRATORY FUNCTION  Goal: Patient will achieve/maintain normal respiratory rate/effort  Description: Respiratory rate and effort will be within normal limits for the patient  5/2/2022 2019 by Geovanna Anglin RCP  Outcome: Progressing     Problem: SKIN INTEGRITY  Goal: Skin integrity is maintained or improved  5/2/2022 2019 by Geovanna Anglin RCP  Outcome: Progressing     Problem: MECHANICAL VENTILATION  Goal: Patient will maintain patent airway  5/2/2022 2019 by Geovanna Anglin RCP  Outcome: Progressing     Problem: MECHANICAL VENTILATION  Goal: Oral health is maintained or improved  5/2/2022 2019 by Geovanna Anglin RCP  Outcome: Progressing     Problem: MECHANICAL VENTILATION  Goal: ET tube will be managed safely  5/2/2022 2019 by Geovanna Anglin RCP  Outcome: Progressing     Problem: MECHANICAL VENTILATION  Goal: Ability to express needs and understand communication  5/2/2022 2019 by Geovanna Anglin RCP  Outcome: Progressing     Problem: MECHANICAL VENTILATION  Goal: Mobility/activity is maintained at optimum level for patient  5/2/2022 2019 by Geovanna Anglin RCP  Outcome: Progressing
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
## 2022-05-03 NOTE — CARE COORDINATION
Received call from [place] at [facility], pt needs to be on 6L of O2 with activity and off precedex before they are able to accept.

## 2022-05-04 PROCEDURE — 2000000000 HC ICU R&B

## 2022-05-04 PROCEDURE — 51798 US URINE CAPACITY MEASURE: CPT

## 2022-05-04 PROCEDURE — 51701 INSERT BLADDER CATHETER: CPT

## 2022-05-04 PROCEDURE — 6360000002 HC RX W HCPCS

## 2022-05-04 PROCEDURE — 6370000000 HC RX 637 (ALT 250 FOR IP): Performed by: EMERGENCY MEDICINE

## 2022-05-04 PROCEDURE — 6360000002 HC RX W HCPCS: Performed by: EMERGENCY MEDICINE

## 2022-05-04 PROCEDURE — 2580000003 HC RX 258: Performed by: EMERGENCY MEDICINE

## 2022-05-04 PROCEDURE — 94003 VENT MGMT INPAT SUBQ DAY: CPT

## 2022-05-04 PROCEDURE — 6360000002 HC RX W HCPCS: Performed by: STUDENT IN AN ORGANIZED HEALTH CARE EDUCATION/TRAINING PROGRAM

## 2022-05-04 PROCEDURE — 2500000003 HC RX 250 WO HCPCS: Performed by: STUDENT IN AN ORGANIZED HEALTH CARE EDUCATION/TRAINING PROGRAM

## 2022-05-04 PROCEDURE — 94761 N-INVAS EAR/PLS OXIMETRY MLT: CPT

## 2022-05-04 PROCEDURE — 6370000000 HC RX 637 (ALT 250 FOR IP): Performed by: STUDENT IN AN ORGANIZED HEALTH CARE EDUCATION/TRAINING PROGRAM

## 2022-05-04 PROCEDURE — 2700000000 HC OXYGEN THERAPY PER DAY

## 2022-05-04 PROCEDURE — 6370000000 HC RX 637 (ALT 250 FOR IP): Performed by: NURSE PRACTITIONER

## 2022-05-04 PROCEDURE — 2500000003 HC RX 250 WO HCPCS

## 2022-05-04 RX ORDER — PROPOFOL 10 MG/ML
5-50 INJECTION, EMULSION INTRAVENOUS CONTINUOUS
Status: DISCONTINUED | OUTPATIENT
Start: 2022-05-04 | End: 2022-05-12 | Stop reason: HOSPADM

## 2022-05-04 RX ORDER — HALOPERIDOL 5 MG/ML
1 INJECTION INTRAMUSCULAR ONCE
Status: COMPLETED | OUTPATIENT
Start: 2022-05-04 | End: 2022-05-04

## 2022-05-04 RX ORDER — METOPROLOL TARTRATE 5 MG/5ML
INJECTION INTRAVENOUS
Status: COMPLETED
Start: 2022-05-04 | End: 2022-05-04

## 2022-05-04 RX ORDER — METOPROLOL TARTRATE 5 MG/5ML
10 INJECTION INTRAVENOUS ONCE
Status: DISCONTINUED | OUTPATIENT
Start: 2022-05-04 | End: 2022-05-04

## 2022-05-04 RX ORDER — MIDAZOLAM HYDROCHLORIDE 2 MG/2ML
2 INJECTION, SOLUTION INTRAMUSCULAR; INTRAVENOUS ONCE
Status: COMPLETED | OUTPATIENT
Start: 2022-05-04 | End: 2022-05-04

## 2022-05-04 RX ORDER — MIDAZOLAM HYDROCHLORIDE 1 MG/ML
INJECTION INTRAMUSCULAR; INTRAVENOUS
Status: COMPLETED
Start: 2022-05-04 | End: 2022-05-04

## 2022-05-04 RX ORDER — METOPROLOL TARTRATE 5 MG/5ML
5 INJECTION INTRAVENOUS ONCE
Status: COMPLETED | OUTPATIENT
Start: 2022-05-04 | End: 2022-05-04

## 2022-05-04 RX ORDER — OXYCODONE HYDROCHLORIDE 5 MG/1
5 TABLET ORAL NIGHTLY
Status: DISCONTINUED | OUTPATIENT
Start: 2022-05-04 | End: 2022-05-06

## 2022-05-04 RX ADMIN — ENOXAPARIN SODIUM 30 MG: 100 INJECTION SUBCUTANEOUS at 09:36

## 2022-05-04 RX ADMIN — ENOXAPARIN SODIUM 30 MG: 100 INJECTION SUBCUTANEOUS at 20:58

## 2022-05-04 RX ADMIN — FOLIC ACID 1 MG: 1 TABLET ORAL at 08:16

## 2022-05-04 RX ADMIN — DEXMEDETOMIDINE HYDROCHLORIDE 0.8 MCG/KG/HR: 4 INJECTION, SOLUTION INTRAVENOUS at 12:33

## 2022-05-04 RX ADMIN — ACETAMINOPHEN 1000 MG: 500 TABLET ORAL at 21:12

## 2022-05-04 RX ADMIN — DOCUSATE SODIUM 50 MG AND SENNOSIDES 8.6 MG 1 TABLET: 8.6; 5 TABLET, FILM COATED ORAL at 08:16

## 2022-05-04 RX ADMIN — METOPROLOL TARTRATE 5 MG: 5 INJECTION INTRAVENOUS at 21:42

## 2022-05-04 RX ADMIN — POLYETHYLENE GLYCOL 3350 17 G: 17 POWDER, FOR SOLUTION ORAL at 08:16

## 2022-05-04 RX ADMIN — DEXMEDETOMIDINE HYDROCHLORIDE 0.9 MCG/KG/HR: 4 INJECTION, SOLUTION INTRAVENOUS at 01:43

## 2022-05-04 RX ADMIN — PROPOFOL 20 MCG/KG/MIN: 10 INJECTION, EMULSION INTRAVENOUS at 14:08

## 2022-05-04 RX ADMIN — ACETAMINOPHEN 1000 MG: 500 TABLET ORAL at 05:09

## 2022-05-04 RX ADMIN — FENTANYL CITRATE 50 MCG: 50 INJECTION, SOLUTION INTRAMUSCULAR; INTRAVENOUS at 07:34

## 2022-05-04 RX ADMIN — OLANZAPINE 5 MG: 5 TABLET, FILM COATED ORAL at 20:57

## 2022-05-04 RX ADMIN — FAMOTIDINE 20 MG: 20 TABLET, FILM COATED ORAL at 08:16

## 2022-05-04 RX ADMIN — OXYCODONE HYDROCHLORIDE 10 MG: 5 TABLET ORAL at 11:10

## 2022-05-04 RX ADMIN — CLONIDINE HYDROCHLORIDE 0.2 MG: 0.2 TABLET ORAL at 05:09

## 2022-05-04 RX ADMIN — PROPOFOL 20 MCG/KG/MIN: 10 INJECTION, EMULSION INTRAVENOUS at 19:27

## 2022-05-04 RX ADMIN — SODIUM CHLORIDE, PRESERVATIVE FREE 10 ML: 5 INJECTION INTRAVENOUS at 07:41

## 2022-05-04 RX ADMIN — GABAPENTIN 600 MG: 600 TABLET ORAL at 06:23

## 2022-05-04 RX ADMIN — CLONIDINE HYDROCHLORIDE 0.3 MG: 0.1 TABLET ORAL at 20:57

## 2022-05-04 RX ADMIN — CLONIDINE HYDROCHLORIDE 0.3 MG: 0.1 TABLET ORAL at 13:02

## 2022-05-04 RX ADMIN — OXYCODONE 5 MG: 5 TABLET ORAL at 21:12

## 2022-05-04 RX ADMIN — GABAPENTIN 600 MG: 600 TABLET ORAL at 20:57

## 2022-05-04 RX ADMIN — MIDAZOLAM HYDROCHLORIDE 2 MG: 1 INJECTION, SOLUTION INTRAMUSCULAR; INTRAVENOUS at 21:06

## 2022-05-04 RX ADMIN — DIAZEPAM 10 MG: 5 TABLET ORAL at 23:48

## 2022-05-04 RX ADMIN — HALOPERIDOL LACTATE 1 MG: 5 INJECTION, SOLUTION INTRAMUSCULAR at 08:12

## 2022-05-04 RX ADMIN — MIDAZOLAM HYDROCHLORIDE 2 MG: 2 INJECTION, SOLUTION INTRAMUSCULAR; INTRAVENOUS at 21:06

## 2022-05-04 RX ADMIN — QUETIAPINE FUMARATE 100 MG: 100 TABLET ORAL at 20:57

## 2022-05-04 RX ADMIN — ERYTHROMYCIN: 5 OINTMENT OPHTHALMIC at 21:00

## 2022-05-04 RX ADMIN — ACETAMINOPHEN 1000 MG: 500 TABLET ORAL at 13:02

## 2022-05-04 RX ADMIN — QUETIAPINE FUMARATE 100 MG: 100 TABLET ORAL at 04:20

## 2022-05-04 RX ADMIN — FAMOTIDINE 20 MG: 20 TABLET, FILM COATED ORAL at 20:57

## 2022-05-04 RX ADMIN — QUETIAPINE FUMARATE 100 MG: 100 TABLET ORAL at 11:10

## 2022-05-04 RX ADMIN — DIAZEPAM 10 MG: 5 TABLET ORAL at 17:26

## 2022-05-04 RX ADMIN — DIAZEPAM 10 MG: 5 TABLET ORAL at 05:09

## 2022-05-04 RX ADMIN — OXYCODONE HYDROCHLORIDE 10 MG: 5 TABLET ORAL at 05:09

## 2022-05-04 RX ADMIN — OXYCODONE HYDROCHLORIDE 10 MG: 5 TABLET ORAL at 17:26

## 2022-05-04 RX ADMIN — GABAPENTIN 600 MG: 600 TABLET ORAL at 12:59

## 2022-05-04 RX ADMIN — Medication 10 MG: at 20:58

## 2022-05-04 RX ADMIN — DEXMEDETOMIDINE HYDROCHLORIDE 0.9 MCG/KG/HR: 4 INJECTION, SOLUTION INTRAVENOUS at 07:11

## 2022-05-04 RX ADMIN — DIAZEPAM 10 MG: 5 TABLET ORAL at 11:10

## 2022-05-04 RX ADMIN — PROPOFOL 50 MCG/KG/MIN: 10 INJECTION, EMULSION INTRAVENOUS at 23:06

## 2022-05-04 RX ADMIN — SODIUM CHLORIDE, PRESERVATIVE FREE 10 ML: 5 INJECTION INTRAVENOUS at 20:57

## 2022-05-04 RX ADMIN — METOPROLOL TARTRATE 5 MG: 1 INJECTION, SOLUTION INTRAVENOUS at 21:42

## 2022-05-04 RX ADMIN — FENTANYL CITRATE 50 MCG: 50 INJECTION, SOLUTION INTRAMUSCULAR; INTRAVENOUS at 05:00

## 2022-05-04 RX ADMIN — OXYCODONE HYDROCHLORIDE 10 MG: 5 TABLET ORAL at 23:47

## 2022-05-04 ASSESSMENT — PULMONARY FUNCTION TESTS
PIF_VALUE: 13
PIF_VALUE: 14
PIF_VALUE: 13
PIF_VALUE: 14
PIF_VALUE: 13
PIF_VALUE: 13
PIF_VALUE: 14
PIF_VALUE: 10
PIF_VALUE: 13
PIF_VALUE: 14
PIF_VALUE: 13

## 2022-05-04 ASSESSMENT — PAIN SCALES - GENERAL
PAINLEVEL_OUTOF10: 0
PAINLEVEL_OUTOF10: 4
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
## 2022-05-04 NOTE — PLAN OF CARE
Problem: OXYGENATION/RESPIRATORY FUNCTION  Goal: Patient will maintain patent airway  Outcome: Progressing  Goal: Patient will achieve/maintain normal respiratory rate/effort  Description: Respiratory rate and effort will be within normal limits for the patient  Outcome: Progressing     Problem: SKIN INTEGRITY  Goal: Skin integrity is maintained or improved  Outcome: Progressing     Problem: Non-Violent Restraints  Goal: Removal from restraints as soon as assessed to be safe  Outcome: Progressing  Goal: No harm/injury to patient while restraints in use  Outcome: Progressing  Goal: Patient's dignity will be maintained  Outcome: Progressing     Problem: Skin Integrity:  Goal: Will show no infection signs and symptoms  Description: Will show no infection signs and symptoms  Outcome: Progressing  Goal: Absence of new skin breakdown  Description: Absence of new skin breakdown  Outcome: Progressing     Problem: Falls - Risk of:  Goal: Will remain free from falls  Description: Will remain free from falls  Outcome: Progressing  Goal: Absence of physical injury  Description: Absence of physical injury  Outcome: Progressing     Problem: Nutrition  Goal: Optimal nutrition therapy  Outcome: Progressing     Problem: MECHANICAL VENTILATION  Goal: Patient will maintain patent airway  Outcome: Progressing  Goal: Oral health is maintained or improved  Outcome: Progressing  Goal: ET tube will be managed safely  Outcome: Progressing  Goal: Ability to express needs and understand communication  Outcome: Progressing  Goal: Mobility/activity is maintained at optimum level for patient  Outcome: Progressing     Problem: Discharge Planning  Goal: Discharge to home or other facility with appropriate resources  Outcome: Progressing     Problem: Safety - Medical Restraint  Goal: Remains free of injury from restraints (Restraint for Interference with Medical Device)  Description: INTERVENTIONS:  1.  Determine that other, less restrictive measures have been tried or would not be effective before applying the restraint  2. Evaluate the patient's condition at the time of restraint application  3. Inform patient/family regarding the reason for restraint  4.  Q2H: Monitor safety, psychosocial status, comfort, nutrition and hydration  Outcome: Progressing  Flowsheets (Taken 5/3/2022 2000)  Remains free of injury from restraints (restraint for interference with medical device): Every 2 hours: Monitor safety, psychosocial status, comfort, nutrition and hydration     Problem: Pain  Goal: Verbalizes/displays adequate comfort level or baseline comfort level  Outcome: Progressing  Flowsheets (Taken 5/3/2022 2000)  Verbalizes/displays adequate comfort level or baseline comfort level: Assess pain using appropriate pain scale     Problem: ABCDS Injury Assessment  Goal: Absence of physical injury  Outcome: Progressing

## 2022-05-04 NOTE — CARE COORDINATION
Received a call from [place] at [facility], in [place],. She tells me that they are able to accept patient on the propofol drip instead of precedex. Will notify trauma that precedex needs to be discontinued and propofol started    Called patients mother nikole updated her on the plan. She is agreeable. I explained that once  We have approval from the insurance, we will be transferring. She is agreeable to the plan. Notified Raman Pérze NP via perfect serve.
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
## 2022-05-04 NOTE — CARE COORDINATION
Called [place] at [facility] and asked about limits on propofol.  informs me that they have no limit and they will titrate accordingly.

## 2022-05-04 NOTE — PLAN OF CARE
Problem: OXYGENATION/RESPIRATORY FUNCTION  Goal: Patient will maintain patent airway  5/4/2022 1048 by Yvan Jackson RN  Outcome: Progressing  5/4/2022 0238 by Kirit Ng RN  Outcome: Progressing  Goal: Patient will achieve/maintain normal respiratory rate/effort  Description: Respiratory rate and effort will be within normal limits for the patient  5/4/2022 1048 by Yvan Jackson, RN  Outcome: Progressing  5/4/2022 0238 by Kirit Ng RN  Outcome: Progressing     Problem: SKIN INTEGRITY  Goal: Skin integrity is maintained or improved  5/4/2022 1048 by Yvan Jackson, RN  Outcome: Progressing  5/4/2022 0238 by Kirit Ng RN  Outcome: Progressing     Problem: MECHANICAL VENTILATION  Goal: Patient will maintain patent airway  5/4/2022 1048 by Yvan Jackson, RN  Outcome: Progressing  5/4/2022 0238 by Kirit gN RN  Outcome: Progressing  Goal: Oral health is maintained or improved  5/4/2022 1048 by Yvan Jackson, RN  Outcome: Progressing  5/4/2022 0238 by Kirit Ng RN  Outcome: Progressing  Goal: ET tube will be managed safely  5/4/2022 1048 by Yvan Jackson RN  Outcome: Progressing  5/4/2022 0238 by Kirit Ng RN  Outcome: Progressing  Goal: Ability to express needs and understand communication  31 [phone] 66 by Yvan Jackson RN  Outcome: Progressing  5/4/2022 0238 by Kirit Ng RN  Outcome: Progressing  Goal: Mobility/activity is maintained at optimum level for patient  5/4/2022 1048 by Yvan Jackson, RN  Outcome: Progressing  5/4/2022 0238 by Kirit Ng RN  Outcome: Progressing     Problem: Non-Violent Restraints  Goal: Removal from restraints as soon as assessed to be safe  5/4/2022 1048 by Yvan Jackson RN  Outcome: Progressing  5/4/2022 0238 by Kirit Ng RN  Outcome: Progressing  Goal: No harm/injury to patient while restraints in use  5/4/2022 1048 by Yvan Jackson RN  Outcome: Progressing  5/4/2022 0238 by Kirit Ng RN  Outcome: Progressing  Goal: Patient's dignity will be maintained  5/4/2022 1048 by Julia Garcia RN  Outcome: Progressing  5/4/2022 0238 by Parisa Sims RN  Outcome: Progressing     Problem: Skin Integrity:  Goal: Will show no infection signs and symptoms  Description: Will show no infection signs and symptoms  5/4/2022 1048 by Julia Garcia RN  Outcome: Progressing  5/4/2022 0238 by Parisa Sims RN  Outcome: Progressing  Goal: Absence of new skin breakdown  Description: Absence of new skin breakdown  5/4/2022 1048 by Julia Garcia RN  Outcome: Progressing  5/4/2022 0238 by Parisa Sims RN  Outcome: Progressing     Problem: Falls - Risk of:  Goal: Will remain free from falls  Description: Will remain free from falls  5/4/2022 1048 by Julia Garcia RN  Outcome: Progressing  5/4/2022 0238 by Parisa Sims RN  Outcome: Progressing  Goal: Absence of physical injury  Description: Absence of physical injury  5/4/2022 1048 by Julia Garcia RN  Outcome: Progressing  5/4/2022 0238 by Parisa Sims RN  Outcome: Progressing     Problem: Nutrition  Goal: Optimal nutrition therapy  5/4/2022 1048 by Julia Garcia RN  Outcome: Progressing  5/4/2022 0238 by Parisa Sims RN  Outcome: Progressing     Problem: Discharge Planning  Goal: Discharge to home or other facility with appropriate resources  5/4/2022 1048 by Julia Garcia RN  Outcome: Progressing  5/4/2022 0238 by Parisa Sims RN  Outcome: Progressing     Problem: Safety - Medical Restraint  Goal: Remains free of injury from restraints (Restraint for Interference with Medical Device)  Description: INTERVENTIONS:  1. Determine that other, less restrictive measures have been tried or would not be effective before applying the restraint  2. Evaluate the patient's condition at the time of restraint application  3. Inform patient/family regarding the reason for restraint  4.  Q2H: Monitor safety, psychosocial status, comfort, nutrition and hydration  5/4/2022 1048 by Julia Garcia RN  Outcome: Progressing  5/4/2022 0238 by Lawanda Gomez RN  Outcome: Progressing  Flowsheets (Taken 5/3/2022 2000)  Remains free of injury from restraints (restraint for interference with medical device): Every 2 hours: Monitor safety, psychosocial status, comfort, nutrition and hydration     Problem: Pain  Goal: Verbalizes/displays adequate comfort level or baseline comfort level  5/4/2022 1048 by Kristen Newman RN  Outcome: Progressing  5/4/2022 0238 by Lawanda Gomez RN  Outcome: Progressing  Flowsheets  Taken 5/4/2022 0000  Verbalizes/displays adequate comfort level or baseline comfort level: Assess pain using appropriate pain scale  Taken 5/3/2022 2000  Verbalizes/displays adequate comfort level or baseline comfort level: Assess pain using appropriate pain scale     Problem: ABCDS Injury Assessment  Goal: Absence of physical injury  5/4/2022 1048 by Kristen Newman RN  Outcome: Progressing  5/4/2022 0238 by Lawanda Gomez RN  Outcome: Progressing Graft Cartilage Fenestration Text: The cartilage was fenestrated with a 2mm punch biopsy to help facilitate graft survival and healing.

## 2022-05-04 NOTE — PROGRESS NOTES
Physical Therapy        Physical Therapy Cancel Note      DATE: 2022    NAME: Charmaine Severs  MRN: [id number]   : 1995      Patient not seen this date for Physical Therapy due to: Other: pt very agitated and restless this morning, requiring haldol.  Continued agitation after, breaking wrist restraints      Electronically signed by Mindy Song PTA on 2022 at 11:39 AM

## 2022-05-05 ENCOUNTER — APPOINTMENT (OUTPATIENT)
Dept: CT IMAGING | Age: 27
DRG: 005 | End: 2022-05-05
Payer: MEDICAID

## 2022-05-05 LAB
ANION GAP SERPL CALCULATED.3IONS-SCNC: 10 MMOL/L (ref 9–17)
BUN BLDV-MCNC: 25 MG/DL (ref 6–20)
CALCIUM SERPL-MCNC: 9.9 MG/DL (ref 8.6–10.4)
CHLORIDE BLD-SCNC: 99 MMOL/L (ref 98–107)
CO2: 32 MMOL/L (ref 20–31)
CREAT SERPL-MCNC: 0.78 MG/DL (ref 0.7–1.2)
GFR AFRICAN AMERICAN: >60 ML/MIN
GFR NON-AFRICAN AMERICAN: >60 ML/MIN
GFR SERPL CREATININE-BSD FRML MDRD: ABNORMAL ML/MIN/{1.73_M2}
GLUCOSE BLD-MCNC: 102 MG/DL (ref 70–99)
HCT VFR BLD CALC: 34.7 % (ref 40.7–50.3)
HEMOGLOBIN: 12.1 G/DL (ref 13–17)
MCH RBC QN AUTO: 32.8 PG (ref 25.2–33.5)
MCHC RBC AUTO-ENTMCNC: 34.9 G/DL (ref 28.4–34.8)
MCV RBC AUTO: 94 FL (ref 82.6–102.9)
NRBC AUTOMATED: 0 PER 100 WBC
PDW BLD-RTO: 12 % (ref 11.8–14.4)
PLATELET # BLD: 412 K/UL (ref 138–453)
PMV BLD AUTO: 9.6 FL (ref 8.1–13.5)
POTASSIUM SERPL-SCNC: 4.1 MMOL/L (ref 3.7–5.3)
RBC # BLD: 3.69 M/UL (ref 4.21–5.77)
SODIUM BLD-SCNC: 141 MMOL/L (ref 135–144)
WBC # BLD: 9.8 K/UL (ref 3.5–11.3)

## 2022-05-05 PROCEDURE — 6360000002 HC RX W HCPCS: Performed by: EMERGENCY MEDICINE

## 2022-05-05 PROCEDURE — 6370000000 HC RX 637 (ALT 250 FOR IP): Performed by: EMERGENCY MEDICINE

## 2022-05-05 PROCEDURE — 94003 VENT MGMT INPAT SUBQ DAY: CPT

## 2022-05-05 PROCEDURE — 6360000002 HC RX W HCPCS: Performed by: STUDENT IN AN ORGANIZED HEALTH CARE EDUCATION/TRAINING PROGRAM

## 2022-05-05 PROCEDURE — 97110 THERAPEUTIC EXERCISES: CPT

## 2022-05-05 PROCEDURE — 6370000000 HC RX 637 (ALT 250 FOR IP): Performed by: STUDENT IN AN ORGANIZED HEALTH CARE EDUCATION/TRAINING PROGRAM

## 2022-05-05 PROCEDURE — 6370000000 HC RX 637 (ALT 250 FOR IP): Performed by: NURSE PRACTITIONER

## 2022-05-05 PROCEDURE — 36415 COLL VENOUS BLD VENIPUNCTURE: CPT

## 2022-05-05 PROCEDURE — 2580000003 HC RX 258: Performed by: EMERGENCY MEDICINE

## 2022-05-05 PROCEDURE — 51701 INSERT BLADDER CATHETER: CPT

## 2022-05-05 PROCEDURE — 70450 CT HEAD/BRAIN W/O DYE: CPT

## 2022-05-05 PROCEDURE — 85027 COMPLETE CBC AUTOMATED: CPT

## 2022-05-05 PROCEDURE — 2700000000 HC OXYGEN THERAPY PER DAY

## 2022-05-05 PROCEDURE — 51798 US URINE CAPACITY MEASURE: CPT

## 2022-05-05 PROCEDURE — 94761 N-INVAS EAR/PLS OXIMETRY MLT: CPT

## 2022-05-05 PROCEDURE — 6360000002 HC RX W HCPCS: Performed by: SURGERY

## 2022-05-05 PROCEDURE — 80048 BASIC METABOLIC PNL TOTAL CA: CPT

## 2022-05-05 PROCEDURE — 2000000000 HC ICU R&B

## 2022-05-05 PROCEDURE — 6360000002 HC RX W HCPCS

## 2022-05-05 PROCEDURE — 2500000003 HC RX 250 WO HCPCS: Performed by: SURGERY

## 2022-05-05 RX ORDER — METOPROLOL TARTRATE 5 MG/5ML
5 INJECTION INTRAVENOUS ONCE
Status: COMPLETED | OUTPATIENT
Start: 2022-05-05 | End: 2022-05-05

## 2022-05-05 RX ORDER — MIDAZOLAM HYDROCHLORIDE 1 MG/ML
INJECTION INTRAMUSCULAR; INTRAVENOUS
Status: COMPLETED
Start: 2022-05-05 | End: 2022-05-05

## 2022-05-05 RX ORDER — FENTANYL CITRATE 50 UG/ML
100 INJECTION, SOLUTION INTRAMUSCULAR; INTRAVENOUS ONCE
Status: COMPLETED | OUTPATIENT
Start: 2022-05-05 | End: 2022-05-05

## 2022-05-05 RX ORDER — MIDAZOLAM HYDROCHLORIDE 2 MG/2ML
3 INJECTION, SOLUTION INTRAMUSCULAR; INTRAVENOUS ONCE
Status: COMPLETED | OUTPATIENT
Start: 2022-05-05 | End: 2022-05-05

## 2022-05-05 RX ADMIN — DOCUSATE SODIUM 50 MG AND SENNOSIDES 8.6 MG 1 TABLET: 8.6; 5 TABLET, FILM COATED ORAL at 20:30

## 2022-05-05 RX ADMIN — MIDAZOLAM HYDROCHLORIDE 3 MG: 2 INJECTION, SOLUTION INTRAMUSCULAR; INTRAVENOUS at 02:31

## 2022-05-05 RX ADMIN — DIAZEPAM 10 MG: 5 TABLET ORAL at 05:02

## 2022-05-05 RX ADMIN — MIDAZOLAM HYDROCHLORIDE 3 MG: 1 INJECTION, SOLUTION INTRAMUSCULAR; INTRAVENOUS at 02:31

## 2022-05-05 RX ADMIN — ENOXAPARIN SODIUM 30 MG: 100 INJECTION SUBCUTANEOUS at 08:52

## 2022-05-05 RX ADMIN — SODIUM CHLORIDE, PRESERVATIVE FREE 10 ML: 5 INJECTION INTRAVENOUS at 20:30

## 2022-05-05 RX ADMIN — OXYCODONE 5 MG: 5 TABLET ORAL at 20:28

## 2022-05-05 RX ADMIN — PROPOFOL 50 MCG/KG/MIN: 10 INJECTION, EMULSION INTRAVENOUS at 02:27

## 2022-05-05 RX ADMIN — DIAZEPAM 10 MG: 5 TABLET ORAL at 17:17

## 2022-05-05 RX ADMIN — PROPOFOL 30 MCG/KG/MIN: 10 INJECTION, EMULSION INTRAVENOUS at 17:08

## 2022-05-05 RX ADMIN — GABAPENTIN 600 MG: 600 TABLET ORAL at 20:31

## 2022-05-05 RX ADMIN — GABAPENTIN 600 MG: 600 TABLET ORAL at 04:52

## 2022-05-05 RX ADMIN — CLONIDINE HYDROCHLORIDE 0.3 MG: 0.1 TABLET ORAL at 22:27

## 2022-05-05 RX ADMIN — CLONIDINE HYDROCHLORIDE 0.3 MG: 0.1 TABLET ORAL at 05:03

## 2022-05-05 RX ADMIN — FOLIC ACID 1 MG: 1 TABLET ORAL at 08:50

## 2022-05-05 RX ADMIN — GABAPENTIN 600 MG: 600 TABLET ORAL at 13:30

## 2022-05-05 RX ADMIN — OXYCODONE HYDROCHLORIDE 10 MG: 5 TABLET ORAL at 05:03

## 2022-05-05 RX ADMIN — Medication 10 MG: at 20:30

## 2022-05-05 RX ADMIN — PROPOFOL 50 MCG/KG/MIN: 10 INJECTION, EMULSION INTRAVENOUS at 09:26

## 2022-05-05 RX ADMIN — FAMOTIDINE 20 MG: 20 TABLET, FILM COATED ORAL at 08:53

## 2022-05-05 RX ADMIN — PROPOFOL 30 MCG/KG/MIN: 10 INJECTION, EMULSION INTRAVENOUS at 22:26

## 2022-05-05 RX ADMIN — FENTANYL CITRATE 50 MCG: 50 INJECTION, SOLUTION INTRAMUSCULAR; INTRAVENOUS at 11:52

## 2022-05-05 RX ADMIN — DIAZEPAM 10 MG: 5 TABLET ORAL at 11:42

## 2022-05-05 RX ADMIN — PROPOFOL 50 MCG/KG/MIN: 10 INJECTION, EMULSION INTRAVENOUS at 13:27

## 2022-05-05 RX ADMIN — FENTANYL CITRATE 100 MCG: 50 INJECTION, SOLUTION INTRAMUSCULAR; INTRAVENOUS at 05:54

## 2022-05-05 RX ADMIN — PROPOFOL 50 MCG/KG/MIN: 10 INJECTION, EMULSION INTRAVENOUS at 06:03

## 2022-05-05 RX ADMIN — ENOXAPARIN SODIUM 30 MG: 100 INJECTION SUBCUTANEOUS at 22:27

## 2022-05-05 RX ADMIN — QUETIAPINE FUMARATE 100 MG: 100 TABLET ORAL at 22:28

## 2022-05-05 RX ADMIN — ACETAMINOPHEN 1000 MG: 500 TABLET ORAL at 13:26

## 2022-05-05 RX ADMIN — FENTANYL CITRATE 100 MCG: 50 INJECTION, SOLUTION INTRAMUSCULAR; INTRAVENOUS at 03:28

## 2022-05-05 RX ADMIN — CLONIDINE HYDROCHLORIDE 0.3 MG: 0.1 TABLET ORAL at 13:29

## 2022-05-05 RX ADMIN — OXYCODONE HYDROCHLORIDE 10 MG: 5 TABLET ORAL at 11:41

## 2022-05-05 RX ADMIN — FAMOTIDINE 20 MG: 20 TABLET, FILM COATED ORAL at 20:30

## 2022-05-05 RX ADMIN — QUETIAPINE FUMARATE 100 MG: 100 TABLET ORAL at 04:52

## 2022-05-05 RX ADMIN — ERYTHROMYCIN: 5 OINTMENT OPHTHALMIC at 20:31

## 2022-05-05 RX ADMIN — OLANZAPINE 5 MG: 5 TABLET, FILM COATED ORAL at 20:30

## 2022-05-05 RX ADMIN — FENTANYL CITRATE 50 MCG: 50 INJECTION, SOLUTION INTRAMUSCULAR; INTRAVENOUS at 08:50

## 2022-05-05 RX ADMIN — ACETAMINOPHEN 1000 MG: 500 TABLET ORAL at 20:30

## 2022-05-05 RX ADMIN — FENTANYL CITRATE 50 MCG: 50 INJECTION, SOLUTION INTRAMUSCULAR; INTRAVENOUS at 01:40

## 2022-05-05 RX ADMIN — METOPROLOL TARTRATE 5 MG: 1 INJECTION, SOLUTION INTRAVENOUS at 00:27

## 2022-05-05 RX ADMIN — POLYETHYLENE GLYCOL 3350 17 G: 17 POWDER, FOR SOLUTION ORAL at 08:50

## 2022-05-05 RX ADMIN — ACETAMINOPHEN 1000 MG: 500 TABLET ORAL at 05:03

## 2022-05-05 RX ADMIN — QUETIAPINE FUMARATE 100 MG: 100 TABLET ORAL at 11:42

## 2022-05-05 RX ADMIN — SODIUM CHLORIDE, PRESERVATIVE FREE 10 ML: 5 INJECTION INTRAVENOUS at 08:52

## 2022-05-05 RX ADMIN — METOPROLOL TARTRATE 5 MG: 1 INJECTION, SOLUTION INTRAVENOUS at 05:54

## 2022-05-05 ASSESSMENT — PULMONARY FUNCTION TESTS
PIF_VALUE: 16
PIF_VALUE: 17
PIF_VALUE: 12
PIF_VALUE: 17
PIF_VALUE: 14
PIF_VALUE: 13
PIF_VALUE: 15
PIF_VALUE: 13
PIF_VALUE: 14
PIF_VALUE: 27
PIF_VALUE: 16
PIF_VALUE: 17
PIF_VALUE: 14
PIF_VALUE: 23
PIF_VALUE: 13
PIF_VALUE: 19
PIF_VALUE: 18
PIF_VALUE: 13
PIF_VALUE: 14
PIF_VALUE: 15
PIF_VALUE: 14
PIF_VALUE: 16
PIF_VALUE: 14
PIF_VALUE: 14
PIF_VALUE: 18
PIF_VALUE: 14
PIF_VALUE: 16

## 2022-05-05 ASSESSMENT — PAIN - FUNCTIONAL ASSESSMENT
PAIN_FUNCTIONAL_ASSESSMENT: ACTIVITIES ARE NOT PREVENTED

## 2022-05-05 NOTE — PROGRESS NOTES
Physical Therapy        Physical Therapy Cancel Note      DATE: 2022    NAME: Robin Bryan  MRN: [id number]   : 1995      Patient not seen this date for Physical Therapy due to: Other: Pt just leaving unit for CT. Per RN pt very agitated all night and getting out of bed.        Electronically signed by Vibha Chung PTA on 2022 at 12:27 PM

## 2022-05-05 NOTE — H&P
Spoke to Ramon Lal at [facility] this AM.  She tells me that the patient will need to be controlled on propofol prior to admission. She also asks about the patient being in a police laya. I informed her that I was not aware of this and would check into it and call her back. [address] of form needing to be filled out for insurance.

## 2022-05-05 NOTE — PROGRESS NOTES
Patient increasing agitated and restless following shift handoff at 1900. Propofol increased as charted up to a rate of 50mcg/kg/min with little to no improvement for patient agitation. Patient broke a soft wrist restraint on his right wrist by pulling too hard, and continually was sitting up and attempting to climb out of bed between 1930 and 2115. This nurse required assistance from other nurses to keep the patient safely in bed. The patient attempted multiple times to kick/knee nurses while attempting to safely keep him in bed. The patient sat up far enough on two occassions that he vent tubing became detached from his trach. Vent circuit quickly replaced by this nurse. Provider Kathy Smallwood came to bedside and ordered a one time dose of Versed 2mg, which was given. Following the Versed and scheduled medications, patient agitation and restlessness has improved slightly. Provider Kathy Smallwood ordered 5mg of IV Lopressor due to sustained tachycardia. At this time, the patient's girlfriend is at bedside and has been updated.

## 2022-05-05 NOTE — PLAN OF CARE
Problem: OXYGENATION/RESPIRATORY FUNCTION  Goal: Patient will maintain patent airway  5/5/2022 1255 by Kacey Joyce RN  Outcome: Progressing  5/5/2022 0532 by Lissett Howe RN  Outcome: Progressing  Goal: Patient will achieve/maintain normal respiratory rate/effort  Description: Respiratory rate and effort will be within normal limits for the patient  5/5/2022 1255 by Kacey Joyce RN  Outcome: Progressing  5/5/2022 0532 by Lissett Howe RN  Outcome: Progressing     Problem: Non-Violent Restraints  Goal: Removal from restraints as soon as assessed to be safe  5/5/2022 1255 by Kacey Joyce RN  Outcome: Progressing  5/5/2022 0532 by Lissett Howe RN  Outcome: Progressing  Goal: No harm/injury to patient while restraints in use  5/5/2022 1255 by Kacey Joyce RN  Outcome: Progressing  5/5/2022 0532 by Lissett Howe RN  Outcome: Progressing  Goal: Patient's dignity will be maintained  5/5/2022 1255 by Kacey Joyce RN  Outcome: Progressing  5/5/2022 0532 by Lissett Howe RN  Outcome: Progressing     Problem: Skin Integrity:  Goal: Will show no infection signs and symptoms  Description: Will show no infection signs and symptoms  5/5/2022 1255 by Kacey Joyce RN  Outcome: Progressing  5/5/2022 0532 by Lissett Howe RN  Outcome: Progressing  Goal: Absence of new skin breakdown  Description: Absence of new skin breakdown  5/5/2022 1255 by Kacey Joyce RN  Outcome: Progressing  5/5/2022 0532 by Lissett Howe RN  Outcome: Progressing     Problem: Falls - Risk of:  Goal: Will remain free from falls  Description: Will remain free from falls  5/5/2022 1255 by Kacey Joyce RN  Outcome: Progressing  5/5/2022 0532 by Lissett Howe RN  Outcome: Progressing  Goal: Absence of physical injury  Description: Absence of physical injury  5/5/2022 1255 by Kacey Joyce RN  Outcome: Progressing  5/5/2022 0532 by Lissett Howe RN  Outcome: Progressing     Problem: Nutrition  Goal: Optimal nutrition therapy  5/5/2022 1255 by Toy Lazaro RN  Outcome: Progressing  5/5/2022 0532 by Laurita Carrasco RN  Outcome: Progressing     Problem: MECHANICAL VENTILATION  Goal: Patient will maintain patent airway  5/5/2022 1255 by Toy Lazaro RN  Outcome: Progressing  5/5/2022 0532 by Laurita Carrasco RN  Outcome: Progressing  Goal: Oral health is maintained or improved  5/5/2022 1255 by Toy Lazaro RN  Outcome: Progressing  5/5/2022 0532 by Laurita Carrasco RN  Outcome: Progressing  Goal: ET tube will be managed safely  5/5/2022 1255 by Toy Lazaro RN  Outcome: Progressing  5/5/2022 0532 by Laurita Carrasco RN  Outcome: Progressing  Goal: Ability to express needs and understand communication  5/5/2022 1255 by Toy Lazaro RN  Outcome: Progressing  5/5/2022 0532 by Laurita Carrasco RN  Outcome: Progressing  Goal: Mobility/activity is maintained at optimum level for patient  5/5/2022 1255 by Toy Lazaro RN  Outcome: Progressing  5/5/2022 0532 by Laurita Carrasco RN  Outcome: Progressing     Problem: Discharge Planning  Goal: Discharge to home or other facility with appropriate resources  5/5/2022 1255 by Toy Lazaro RN  Outcome: Progressing  Flowsheets (Taken 5/5/2022 0800)  Discharge to home or other facility with appropriate resources: Refer to discharge planning if patient needs post-hospital services based on physician order or complex needs related to functional status, cognitive ability or social support system  5/5/2022 0532 by Laurita Carrasco RN  Outcome: Progressing     Problem: Safety - Medical Restraint  Goal: Remains free of injury from restraints (Restraint for Interference with Medical Device)  Description: INTERVENTIONS:  1. Determine that other, less restrictive measures have been tried or would not be effective before applying the restraint  2. Evaluate the patient's condition at the time of restraint application  3.  Inform Progressing     Problem: SKIN INTEGRITY  Goal: Skin integrity is maintained or improved  5/5/2022 1255 by Rajiv Lyman RN  Outcome: Adequate for Discharge  5/5/2022 0532 by Bhargavi Bee RN  Outcome: Progressing

## 2022-05-05 NOTE — PLAN OF CARE
measures have been tried or would not be effective before applying the restraint  2. Evaluate the patient's condition at the time of restraint application  3. Inform patient/family regarding the reason for restraint  4.  Q2H: Monitor safety, psychosocial status, comfort, nutrition and hydration  Outcome: Progressing  Flowsheets (Taken 5/4/2022 2000)  Remains free of injury from restraints (restraint for interference with medical device):   Determine that other, less restrictive measures have been tried or would not be effective before applying the restraint   Every 2 hours: Monitor safety, psychosocial status, comfort, nutrition and hydration   Evaluate the patient's condition at the time of restraint application   Inform patient/family regarding the reason for restraint     Problem: Pain  Goal: Verbalizes/displays adequate comfort level or baseline comfort level  Outcome: Progressing  Flowsheets (Taken 5/4/2022 2000)  Verbalizes/displays adequate comfort level or baseline comfort level:   Assess pain using appropriate pain scale   Administer analgesics based on type and severity of pain and evaluate response   Implement non-pharmacological measures as appropriate and evaluate response     Problem: ABCDS Injury Assessment  Goal: Absence of physical injury  Outcome: Progressing
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
## 2022-05-05 NOTE — PROGRESS NOTES
Physical Therapy  DATE: 2022  NAME: Lul Camarena  MRN: [id number]   : 1995    Discharge Recommendations: Continue to Assess (pending progress)     Subjective: RN agreeable to ROM . Per RN Pt is sedated d/t agitation. Pain: CODY  Patient follows: No Commands  Is patient on ventilator: NO ( Trach)  Is patient on sedation: Yes  Precautions: General, Seizure2    Therapeutic exercises:  UE/LE(s)  Bilateral Passive range of motion all planes x 10 reps  bilateral gastrocnemius stretching 3 reps x 20 seconds  FDS switched to Left foot. Goals  Short Term Goals  Short term goal 1: Perform bed mobility with SBA  Short term goal 2: Perform sit to stand transfer with Min A  Short term goal 3: Ambulate 100ft with appropriate AD and Min A  Short term goal 4: Demo Fair- dynamic standing balance to decrease risk of falls       Plan: Progress functional mobility as medically appropriate.    Time In: 7753  Time Out: 1445  Time Coded Minutes (treatment minutes): 12  Rehab Potential: Good   Treatments/week: 5-6x/wk    Ayleen Ruiz PTA

## 2022-05-06 ENCOUNTER — APPOINTMENT (OUTPATIENT)
Dept: GENERAL RADIOLOGY | Age: 27
DRG: 005 | End: 2022-05-06
Payer: MEDICAID

## 2022-05-06 LAB
-: ABNORMAL
ABSOLUTE EOS #: 0.04 K/UL (ref 0–0.44)
ABSOLUTE IMMATURE GRANULOCYTE: 0.11 K/UL (ref 0–0.3)
ABSOLUTE LYMPH #: 1.06 K/UL (ref 1.1–3.7)
ABSOLUTE MONO #: 0.78 K/UL (ref 0.1–1.2)
ALBUMIN SERPL-MCNC: 3.4 G/DL (ref 3.5–5.2)
ALBUMIN/GLOBULIN RATIO: 0.9 (ref 1–2.5)
ALLEN TEST: POSITIVE
ALP BLD-CCNC: 62 U/L (ref 40–129)
ALT SERPL-CCNC: 20 U/L (ref 5–41)
ANION GAP SERPL CALCULATED.3IONS-SCNC: 8 MMOL/L (ref 9–17)
AST SERPL-CCNC: 34 U/L
BASOPHILS # BLD: 0 % (ref 0–2)
BASOPHILS ABSOLUTE: 0.05 K/UL (ref 0–0.2)
BILIRUB SERPL-MCNC: 0.23 MG/DL (ref 0.3–1.2)
BILIRUBIN DIRECT: <0.08 MG/DL
BILIRUBIN URINE: NEGATIVE
BILIRUBIN, INDIRECT: ABNORMAL MG/DL (ref 0–1)
BUN BLDV-MCNC: 23 MG/DL (ref 6–20)
CALCIUM SERPL-MCNC: 9.3 MG/DL (ref 8.6–10.4)
CASTS UA: ABNORMAL /LPF (ref 0–8)
CHLORIDE BLD-SCNC: 100 MMOL/L (ref 98–107)
CO2: 27 MMOL/L (ref 20–31)
COLOR: YELLOW
CREAT SERPL-MCNC: 0.73 MG/DL (ref 0.7–1.2)
EOSINOPHILS RELATIVE PERCENT: 0 % (ref 1–4)
EPITHELIAL CELLS UA: ABNORMAL /HPF (ref 0–5)
FIO2: 40
GFR AFRICAN AMERICAN: >60 ML/MIN
GFR NON-AFRICAN AMERICAN: >60 ML/MIN
GFR SERPL CREATININE-BSD FRML MDRD: ABNORMAL ML/MIN/{1.73_M2}
GLUCOSE BLD-MCNC: 134 MG/DL (ref 70–99)
GLUCOSE URINE: NEGATIVE
HCT VFR BLD CALC: 35 % (ref 40.7–50.3)
HEMOGLOBIN: 12 G/DL (ref 13–17)
IMMATURE GRANULOCYTES: 1 %
KETONES, URINE: NEGATIVE
LEUKOCYTE ESTERASE, URINE: NEGATIVE
LYMPHOCYTES # BLD: 6 % (ref 24–43)
MCH RBC QN AUTO: 32.7 PG (ref 25.2–33.5)
MCHC RBC AUTO-ENTMCNC: 34.3 G/DL (ref 28.4–34.8)
MCV RBC AUTO: 95.4 FL (ref 82.6–102.9)
MONOCYTES # BLD: 4 % (ref 3–12)
NITRITE, URINE: POSITIVE
NRBC AUTOMATED: 0 PER 100 WBC
PDW BLD-RTO: 12.4 % (ref 11.8–14.4)
PH UA: 5.5 (ref 5–8)
PLATELET # BLD: 381 K/UL (ref 138–453)
PMV BLD AUTO: 9.8 FL (ref 8.1–13.5)
POC HCO3: 27.4 MMOL/L (ref 21–28)
POC LACTIC ACID: 0.82 MMOL/L (ref 0.56–1.39)
POC O2 SATURATION: 96 % (ref 94–98)
POC PCO2: 42.9 MM HG (ref 35–48)
POC PH: 7.41 (ref 7.35–7.45)
POC PO2: 82.1 MM HG (ref 83–108)
POSITIVE BASE EXCESS, ART: 2 (ref 0–3)
POTASSIUM SERPL-SCNC: 5 MMOL/L (ref 3.7–5.3)
PROCALCITONIN: 0.17 NG/ML
PROTEIN UA: NEGATIVE
RBC # BLD: 3.67 M/UL (ref 4.21–5.77)
RBC UA: ABNORMAL /HPF (ref 0–4)
SAMPLE SITE: ABNORMAL
SEG NEUTROPHILS: 89 % (ref 36–65)
SEGMENTED NEUTROPHILS ABSOLUTE COUNT: 16.84 K/UL (ref 1.5–8.1)
SODIUM BLD-SCNC: 135 MMOL/L (ref 135–144)
SPECIFIC GRAVITY UA: 1.03 (ref 1–1.03)
TOTAL PROTEIN: 7.2 G/DL (ref 6.4–8.3)
TRIGL SERPL-MCNC: 79 MG/DL
TURBIDITY: CLEAR
URINE HGB: NEGATIVE
UROBILINOGEN, URINE: NORMAL
WBC # BLD: 18.9 K/UL (ref 3.5–11.3)
WBC # BLD: 19 K/UL (ref 3.5–11.3)
WBC UA: ABNORMAL /HPF (ref 0–5)

## 2022-05-06 PROCEDURE — 80048 BASIC METABOLIC PNL TOTAL CA: CPT

## 2022-05-06 PROCEDURE — 6370000000 HC RX 637 (ALT 250 FOR IP): Performed by: EMERGENCY MEDICINE

## 2022-05-06 PROCEDURE — 6360000002 HC RX W HCPCS: Performed by: STUDENT IN AN ORGANIZED HEALTH CARE EDUCATION/TRAINING PROGRAM

## 2022-05-06 PROCEDURE — 6370000000 HC RX 637 (ALT 250 FOR IP): Performed by: STUDENT IN AN ORGANIZED HEALTH CARE EDUCATION/TRAINING PROGRAM

## 2022-05-06 PROCEDURE — 80076 HEPATIC FUNCTION PANEL: CPT

## 2022-05-06 PROCEDURE — 83605 ASSAY OF LACTIC ACID: CPT

## 2022-05-06 PROCEDURE — 71045 X-RAY EXAM CHEST 1 VIEW: CPT

## 2022-05-06 PROCEDURE — 85025 COMPLETE CBC W/AUTO DIFF WBC: CPT

## 2022-05-06 PROCEDURE — 85048 AUTOMATED LEUKOCYTE COUNT: CPT

## 2022-05-06 PROCEDURE — 6370000000 HC RX 637 (ALT 250 FOR IP): Performed by: NURSE PRACTITIONER

## 2022-05-06 PROCEDURE — 94003 VENT MGMT INPAT SUBQ DAY: CPT

## 2022-05-06 PROCEDURE — 36415 COLL VENOUS BLD VENIPUNCTURE: CPT

## 2022-05-06 PROCEDURE — 6360000002 HC RX W HCPCS: Performed by: EMERGENCY MEDICINE

## 2022-05-06 PROCEDURE — 82803 BLOOD GASES ANY COMBINATION: CPT

## 2022-05-06 PROCEDURE — 51701 INSERT BLADDER CATHETER: CPT

## 2022-05-06 PROCEDURE — 97110 THERAPEUTIC EXERCISES: CPT

## 2022-05-06 PROCEDURE — 84145 PROCALCITONIN (PCT): CPT

## 2022-05-06 PROCEDURE — 94761 N-INVAS EAR/PLS OXIMETRY MLT: CPT

## 2022-05-06 PROCEDURE — 2580000003 HC RX 258: Performed by: EMERGENCY MEDICINE

## 2022-05-06 PROCEDURE — 51798 US URINE CAPACITY MEASURE: CPT

## 2022-05-06 PROCEDURE — 2700000000 HC OXYGEN THERAPY PER DAY

## 2022-05-06 PROCEDURE — 2000000000 HC ICU R&B

## 2022-05-06 PROCEDURE — 81001 URINALYSIS AUTO W/SCOPE: CPT

## 2022-05-06 PROCEDURE — 84478 ASSAY OF TRIGLYCERIDES: CPT

## 2022-05-06 PROCEDURE — 36600 WITHDRAWAL OF ARTERIAL BLOOD: CPT

## 2022-05-06 RX ORDER — OXYCODONE HYDROCHLORIDE 5 MG/1
5 TABLET ORAL EVERY 6 HOURS SCHEDULED
Status: DISCONTINUED | OUTPATIENT
Start: 2022-05-06 | End: 2022-05-10

## 2022-05-06 RX ADMIN — GABAPENTIN 600 MG: 600 TABLET ORAL at 09:02

## 2022-05-06 RX ADMIN — DOCUSATE SODIUM 50 MG AND SENNOSIDES 8.6 MG 1 TABLET: 8.6; 5 TABLET, FILM COATED ORAL at 21:25

## 2022-05-06 RX ADMIN — QUETIAPINE FUMARATE 100 MG: 100 TABLET ORAL at 12:04

## 2022-05-06 RX ADMIN — FENTANYL CITRATE 50 MCG: 50 INJECTION, SOLUTION INTRAMUSCULAR; INTRAVENOUS at 07:55

## 2022-05-06 RX ADMIN — POLYETHYLENE GLYCOL 3350 17 G: 17 POWDER, FOR SOLUTION ORAL at 07:55

## 2022-05-06 RX ADMIN — FOLIC ACID 1 MG: 1 TABLET ORAL at 09:02

## 2022-05-06 RX ADMIN — CLONIDINE HYDROCHLORIDE 0.3 MG: 0.1 TABLET ORAL at 09:02

## 2022-05-06 RX ADMIN — FENTANYL CITRATE 50 MCG: 50 INJECTION, SOLUTION INTRAMUSCULAR; INTRAVENOUS at 05:08

## 2022-05-06 RX ADMIN — FAMOTIDINE 20 MG: 20 TABLET, FILM COATED ORAL at 21:25

## 2022-05-06 RX ADMIN — ACETAMINOPHEN 1000 MG: 500 TABLET ORAL at 05:59

## 2022-05-06 RX ADMIN — OXYCODONE HYDROCHLORIDE 10 MG: 5 TABLET ORAL at 01:09

## 2022-05-06 RX ADMIN — DIAZEPAM 10 MG: 5 TABLET ORAL at 01:09

## 2022-05-06 RX ADMIN — OXYCODONE HYDROCHLORIDE 5 MG: 5 TABLET ORAL at 18:28

## 2022-05-06 RX ADMIN — PROPOFOL 30 MCG/KG/MIN: 10 INJECTION, EMULSION INTRAVENOUS at 04:38

## 2022-05-06 RX ADMIN — ENOXAPARIN SODIUM 30 MG: 100 INJECTION SUBCUTANEOUS at 21:25

## 2022-05-06 RX ADMIN — CLONIDINE HYDROCHLORIDE 0.3 MG: 0.1 TABLET ORAL at 21:00

## 2022-05-06 RX ADMIN — QUETIAPINE FUMARATE 100 MG: 100 TABLET ORAL at 04:30

## 2022-05-06 RX ADMIN — GABAPENTIN 600 MG: 600 TABLET ORAL at 20:37

## 2022-05-06 RX ADMIN — DIAZEPAM 10 MG: 5 TABLET ORAL at 06:00

## 2022-05-06 RX ADMIN — DOCUSATE SODIUM 50 MG AND SENNOSIDES 8.6 MG 1 TABLET: 8.6; 5 TABLET, FILM COATED ORAL at 09:02

## 2022-05-06 RX ADMIN — SODIUM CHLORIDE, PRESERVATIVE FREE 10 ML: 5 INJECTION INTRAVENOUS at 20:39

## 2022-05-06 RX ADMIN — FAMOTIDINE 20 MG: 20 TABLET, FILM COATED ORAL at 09:02

## 2022-05-06 RX ADMIN — OXYCODONE HYDROCHLORIDE 10 MG: 5 TABLET ORAL at 06:00

## 2022-05-06 RX ADMIN — OXYCODONE HYDROCHLORIDE 5 MG: 5 TABLET ORAL at 12:02

## 2022-05-06 RX ADMIN — Medication 10 MG: at 09:02

## 2022-05-06 RX ADMIN — ERYTHROMYCIN: 5 OINTMENT OPHTHALMIC at 20:40

## 2022-05-06 RX ADMIN — GABAPENTIN 600 MG: 600 TABLET ORAL at 17:16

## 2022-05-06 RX ADMIN — Medication 10 MG: at 20:37

## 2022-05-06 RX ADMIN — ACETAMINOPHEN 1000 MG: 500 TABLET ORAL at 13:07

## 2022-05-06 RX ADMIN — FENTANYL CITRATE 50 MCG: 50 INJECTION, SOLUTION INTRAMUSCULAR; INTRAVENOUS at 02:38

## 2022-05-06 RX ADMIN — OLANZAPINE 5 MG: 5 TABLET, FILM COATED ORAL at 20:37

## 2022-05-06 RX ADMIN — ENOXAPARIN SODIUM 30 MG: 100 INJECTION SUBCUTANEOUS at 09:02

## 2022-05-06 RX ADMIN — PROPOFOL 25 MCG/KG/MIN: 10 INJECTION, EMULSION INTRAVENOUS at 10:41

## 2022-05-06 RX ADMIN — DIAZEPAM 10 MG: 5 TABLET ORAL at 12:02

## 2022-05-06 RX ADMIN — PROPOFOL 30 MCG/KG/MIN: 10 INJECTION, EMULSION INTRAVENOUS at 17:09

## 2022-05-06 RX ADMIN — ACETAMINOPHEN 1000 MG: 500 TABLET ORAL at 20:38

## 2022-05-06 RX ADMIN — QUETIAPINE FUMARATE 100 MG: 100 TABLET ORAL at 21:00

## 2022-05-06 ASSESSMENT — PULMONARY FUNCTION TESTS
PIF_VALUE: 10
PIF_VALUE: 15
PIF_VALUE: 20
PIF_VALUE: 15
PIF_VALUE: 14
PIF_VALUE: 26
PIF_VALUE: 10
PIF_VALUE: 13
PIF_VALUE: 11
PIF_VALUE: 10
PIF_VALUE: 14
PIF_VALUE: 16
PIF_VALUE: 10
PIF_VALUE: 12
PIF_VALUE: 11
PIF_VALUE: 10
PIF_VALUE: 11
PIF_VALUE: 9
PIF_VALUE: 14
PIF_VALUE: 10
PIF_VALUE: 13
PIF_VALUE: 18
PIF_VALUE: 10
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
## 2022-05-06 NOTE — PROGRESS NOTES
Comprehensive Nutrition Assessment    Type and Reason for Visit:  Reassess    Nutrition Recommendations/Plan:   1. Suggest modifying TF of Immune Enhancing formula - suggest bolus feedings of 400 mL x 4 per day with propofol being provided = 2400 kcals (2799 kcals + propofol), 150 gm pro/day. Monitor TF tolerance/adequacy of intakes - modify as needed. 2. Monitor labs, weights, and plan of care. Malnutrition Assessment:  Malnutrition Status: At risk for malnutrition (Comment) (05/04/22 1205)    Context:  Acute Illness     Findings of the 6 clinical characteristics of malnutrition:  Energy Intake:  No significant decrease in energy intake - Meeting estimated needs with TF. Weight Loss:  Unable to assess - Wt fluctuations since admission noted - down 5.5% (? d/t fluids. Body Fat Loss:  No significant body fat loss   Muscle Mass Loss:  No significant muscle mass loss  Fluid Accumulation:  Mild Extremities,Generalized   Strength:  Not Performed    Nutrition Assessment:    Pt continues to tolerate Immune Enhancing bolus feedings of 450 mL x 4 per day - RN reports pt tolerating feedings without issues. Noted propofol has been restarted due to agitation - running at 15.1 mL/hr. Discussed with RN about decreasing volume of boluses to prevent overfeeding while receiving propofol. Last BM 5/4. Labs/Meds reviewed. Nutrition Related Findings:    Labs/Meds reviewed. Last BM 5/4. +Trach and PEG. Wound Type: Surgical Incision       Current Nutrition Intake & Therapies:    Average Meal Intake: NPO  Average Supplements Intake: NPO  ADULT TUBE FEEDING; PEG; Immune Enhancing; Bolus; 4 Times Daily; 450; 30; Q 4 hours  Current Tube Feeding (TF) Orders:  · Feeding Route: PEG  · Formula: Immune Enhancing  · Schedule:  Bolus  · Feeding Regimen: 450 mL x 4 per day  · Water Flushes: 30 mL every 4 hours  · Current TF & Flush Orders Provides: Bolus feedings of 450 mL x 4 per day = 2700 kcals (+kcals from propofol), 168 gm pro, 1350 mL free water per day  · Goal TF & Flush Orders Provides: Pivot 1.5 (Immune Enhancing) bolus feedings of 400 mL x 4 per day = 2400 kcals (+kcals from propofol), 150 gm pro, 1200 mL free water per day    Additional Calorie Sources:  · Propofol running at 15.1 mL/hr = 399 kcals/day    Anthropometric Measures:  Height: 5' 11\" (180.3 cm)  Ideal Body Weight (IBW): 172 lbs (78 kg)    Admission Body Weight: 235 lb 3.7 oz (106.7 kg) (4/12, bedscale)  Current Body Weight: 222 lb 0.1 oz (100.7 kg), 129.1 % IBW. Weight Source: Bed Scale  Current BMI (kg/m2): 31  Usual Body Weight:  (UTO)     Weight Adjustment For: No Adjustment                 BMI Categories: Obese Class 1 (BMI 30.0-34. 9)    Estimated Daily Nutrient Needs:  Energy Requirements Based On: Formula  Weight Used for Energy Requirements: Current  Energy (kcal/day): 2676-4202 kcal/day  Weight Used for Protein Requirements: Ideal (1.5-2)  Protein (g/day): 120-160 g pro/day  Method Used for Fluid Requirements: Other  Fluid (ml/day): Per MD    Nutrition Diagnosis:   · Inadequate oral intake related to acute injury/trauma,impaired respiratory function as evidenced by NPO or clear liquid status due to medical condition,nutrition support - enteral nutrition    Nutrition Interventions:   Food and/or Nutrient Delivery: Modify Tube Feeding (Suggest decreasing volume of bolus feedings to 400 mL x 4 per day with propofol being provided.)  Nutrition Education/Counseling: No recommendation at this time  Coordination of Nutrition Care: Continue to monitor while inpatient,Speech Therapy,Swallow Evaluation       Goals:  Previous Goal Met: Goal(s) Achieved  Goals: Meet at least 75% of estimated needs       Nutrition Monitoring and Evaluation:   Behavioral-Environmental Outcomes: None Identified  Food/Nutrient Intake Outcomes: Enteral Nutrition Intake/Tolerance,Diet Advancement/Tolerance  Physical Signs/Symptoms Outcomes: Biochemical Data,Chewing or Swallowing,GI Status,Fluid Status or Edema,Hemodynamic Status,Nutrition Focused Physical Findings,Skin,Weight    Discharge Planning:    Enteral Nutrition     Benito Grayson RD, LD  Contact: [phone]

## 2022-05-06 NOTE — PLAN OF CARE
Problem: OXYGENATION/RESPIRATORY FUNCTION  Goal: Patient will maintain patent airway  5/6/2022 0119 by Thermgrant Briones, RCP  Outcome: Progressing  5/5/2022 1255 by Sofie Zapata RN  Outcome: Progressing     Problem: OXYGENATION/RESPIRATORY FUNCTION  Goal: Patient will achieve/maintain normal respiratory rate/effort  Description: Respiratory rate and effort will be within normal limits for the patient  5/6/2022 0119 by Thermgrant Briones, RCP  Outcome: Progressing  5/5/2022 1255 by Sofie Zapata RN  Outcome: Progressing     Problem: MECHANICAL VENTILATION  Goal: Patient will maintain patent airway  5/6/2022 0119 by Thermgrant Briones, RCP  Outcome: Progressing  5/5/2022 1255 by Sofie Zapata RN  Outcome: Progressing     Problem: MECHANICAL VENTILATION  Goal: Oral health is maintained or improved  5/6/2022 0119 by Jagjit Briones, RCP  Outcome: Progressing  5/5/2022 1255 by Sofie Zapata RN  Outcome: Progressing     Problem: MECHANICAL VENTILATION  Goal: Ability to express needs and understand communication  5/5/2022 1255 by Sofie Zapata RN  Outcome: Progressing

## 2022-05-06 NOTE — PROGRESS NOTES
Physical Therapy  DATE: 2022  NAME: Emil Zabala  MRN: [id number]   : 1995    Discharge Recommendations: Continue to Assess (pending progress)     Subjective: RN agreeable to ROM . Per RN Pt is sedated d/t agitation. Pain: CODY  Patient follows: No Commands  Is patient on ventilator: NO ( Trach)  Is patient on sedation: Yes  Precautions: General, Seizure    Therapeutic exercises:  UE/LE(s)  Bilateral Passive range of motion all planes x 10 reps  bilateral gastrocnemius stretching 2 reps x 20 seconds  FDS switched to right foot. Goals  Short Term Goals  Short term goal 1: Perform bed mobility with SBA  Short term goal 2: Perform sit to stand transfer with Min A  Short term goal 3: Ambulate 100ft with appropriate AD and Min A  Short term goal 4: Demo Fair- dynamic standing balance to decrease risk of falls       Plan: Progress functional mobility as medically appropriate.    Time In: 848  Time Out: 913  Time Coded Minutes (treatment minutes): 25  Rehab Potential: Good   Treatments/week: 5-6x/wk    [facility] PT
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
## 2022-05-06 NOTE — PLAN OF CARE
Problem: OXYGENATION/RESPIRATORY FUNCTION  Goal: Patient will maintain patent airway  5/6/2022 1500 by Monalisa Ngo RN  Outcome: Adequate for Discharge  5/6/2022 0119 by Keven Joseph RCP  Outcome: Progressing  Goal: Patient will achieve/maintain normal respiratory rate/effort  Description: Respiratory rate and effort will be within normal limits for the patient  5/6/2022 1500 by Monalisa Ngo RN  Outcome: Adequate for Discharge  5/6/2022 0119 by Keven Joseph RCP  Outcome: Progressing     Problem: SKIN INTEGRITY  Goal: Skin integrity is maintained or improved  Outcome: Adequate for Discharge     Problem: Non-Violent Restraints  Goal: Removal from restraints as soon as assessed to be safe  5/6/2022 1500 by Monalisa Ngo RN  Outcome: Adequate for Discharge  5/6/2022 0514 by Jackie Joe RN  Outcome: Progressing  Goal: No harm/injury to patient while restraints in use  5/6/2022 1500 by Monalisa Ngo RN  Outcome: Adequate for Discharge  5/6/2022 0514 by Jackie Joe RN  Outcome: Progressing  Goal: Patient's dignity will be maintained  5/6/2022 1500 by Monalisa Ngo RN  Outcome: Adequate for Discharge  5/6/2022 0514 by Jackie Joe RN  Outcome: Progressing     Problem: Skin Integrity:  Goal: Will show no infection signs and symptoms  Description: Will show no infection signs and symptoms  Outcome: Adequate for Discharge  Goal: Absence of new skin breakdown  Description: Absence of new skin breakdown  Outcome: Adequate for Discharge     Problem: Falls - Risk of:  Goal: Will remain free from falls  Description: Will remain free from falls  Outcome: Adequate for Discharge  Goal: Absence of physical injury  Description: Absence of physical injury  Outcome: Adequate for Discharge     Problem: Nutrition  Goal: Optimal nutrition therapy  Outcome: Adequate for Discharge     Problem: MECHANICAL VENTILATION  Goal: Patient will maintain patent airway  5/6/2022 1500 by Monalisa Ngo RN  Outcome: Adequate for Discharge  5/6/2022 0119 by Maria Isabel Hammond RCP  Outcome: Progressing  Goal: Oral health is maintained or improved  5/6/2022 1500 by Jeffrey Moses RN  Outcome: Adequate for Discharge  5/6/2022 0119 by Maria Isabel Hammond RCP  Outcome: Progressing  Goal: Ability to express needs and understand communication  Outcome: Adequate for Discharge  Goal: Mobility/activity is maintained at optimum level for patient  Outcome: Adequate for Discharge     Problem: Discharge Planning  Goal: Discharge to home or other facility with appropriate resources  Outcome: Adequate for Discharge     Problem: Safety - Medical Restraint  Goal: Remains free of injury from restraints (Restraint for Interference with Medical Device)  Description: INTERVENTIONS:  1. Determine that other, less restrictive measures have been tried or would not be effective before applying the restraint  2. Evaluate the patient's condition at the time of restraint application  3. Inform patient/family regarding the reason for restraint  4.  Q2H: Monitor safety, psychosocial status, comfort, nutrition and hydration  Outcome: Adequate for Discharge  Flowsheets  Taken 5/6/2022 1400 by Jeffrey Moses RN  Remains free of injury from restraints (restraint for interference with medical device): Every 2 hours: Monitor safety, psychosocial status, comfort, nutrition and hydration  Taken 5/6/2022 1239 by Jeffrey Moses RN  Remains free of injury from restraints (restraint for interference with medical device): Every 2 hours: Monitor safety, psychosocial status, comfort, nutrition and hydration  Taken 5/6/2022 1000 by Jeffrey Moses RN  Remains free of injury from restraints (restraint for interference with medical device): Every 2 hours: Monitor safety, psychosocial status, comfort, nutrition and hydration  Taken 5/6/2022 0800 by Jeffrey Moses RN  Remains free of injury from restraints (restraint for interference with medical device): Every 2 hours: Monitor safety, psychosocial status, comfort, nutrition and hydration  Taken 5/6/2022 0600 by Migel Kruger RN  Remains free of injury from restraints (restraint for interference with medical device): Every 2 hours: Monitor safety, psychosocial status, comfort, nutrition and hydration  Taken 5/6/2022 0400 by Migel Kruger RN  Remains free of injury from restraints (restraint for interference with medical device): Every 2 hours: Monitor safety, psychosocial status, comfort, nutrition and hydration  Taken 5/6/2022 0200 by Migel Kruger, RN  Remains free of injury from restraints (restraint for interference with medical device): Every 2 hours: Monitor safety, psychosocial status, comfort, nutrition and hydration     Problem: Pain  Goal: Verbalizes/displays adequate comfort level or baseline comfort level  Outcome: Adequate for Discharge     Problem: ABCDS Injury Assessment  Goal: Absence of physical injury  Outcome: Adequate for Discharge

## 2022-05-06 NOTE — CARE COORDINATION
Patient remains on propofol remains agitated. Fevers and Leulocytosis. WBC 19. Awaiting precert for admission to [address] [place] [address]. Patient needs to remain calm and stable on propofol prior to admission. Erin Hill At [address] [place] [address] [phone], still awaiting precert. Updated her on clinical status. [address] [place] [address] is asking for car insurance. Attempting to find. Called patient's mother to see if patient has car insurance. She tells me that honestly she does not believe that he does. Called patient's girlfriend Alexandr Rose she informs me that the patient does not have car insurance. Notified Erin Hill at [address] [place] [address].

## 2022-05-06 NOTE — PROGRESS NOTES
Called to room for low O2 sat 76%. Removed patient from vent , ventilated with ambu and suctioned mod amount white thin secretions. O2 sat improved and patient placed back on vent at charted settings.

## 2022-05-07 LAB
ANION GAP SERPL CALCULATED.3IONS-SCNC: 12 MMOL/L (ref 9–17)
BUN BLDV-MCNC: 22 MG/DL (ref 6–20)
CALCIUM SERPL-MCNC: 9.3 MG/DL (ref 8.6–10.4)
CHLORIDE BLD-SCNC: 97 MMOL/L (ref 98–107)
CO2: 26 MMOL/L (ref 20–31)
CREAT SERPL-MCNC: 0.62 MG/DL (ref 0.7–1.2)
EKG ATRIAL RATE: 131 BPM
EKG P AXIS: 46 DEGREES
EKG P-R INTERVAL: 134 MS
EKG Q-T INTERVAL: 314 MS
EKG QRS DURATION: 92 MS
EKG QTC CALCULATION (BAZETT): 463 MS
EKG R AXIS: 11 DEGREES
EKG T AXIS: 39 DEGREES
EKG VENTRICULAR RATE: 131 BPM
GFR AFRICAN AMERICAN: >60 ML/MIN
GFR NON-AFRICAN AMERICAN: >60 ML/MIN
GFR SERPL CREATININE-BSD FRML MDRD: ABNORMAL ML/MIN/{1.73_M2}
GLUCOSE BLD-MCNC: 111 MG/DL (ref 70–99)
HCT VFR BLD CALC: 36.5 % (ref 40.7–50.3)
HEMOGLOBIN: 12.2 G/DL (ref 13–17)
MCH RBC QN AUTO: 32.3 PG (ref 25.2–33.5)
MCHC RBC AUTO-ENTMCNC: 33.4 G/DL (ref 28.4–34.8)
MCV RBC AUTO: 96.6 FL (ref 82.6–102.9)
NRBC AUTOMATED: 0 PER 100 WBC
PDW BLD-RTO: 12.7 % (ref 11.8–14.4)
PLATELET # BLD: 239 K/UL (ref 138–453)
PMV BLD AUTO: 10.9 FL (ref 8.1–13.5)
POTASSIUM SERPL-SCNC: 4.7 MMOL/L (ref 3.7–5.3)
RBC # BLD: 3.78 M/UL (ref 4.21–5.77)
SODIUM BLD-SCNC: 135 MMOL/L (ref 135–144)
WBC # BLD: 12.1 K/UL (ref 3.5–11.3)

## 2022-05-07 PROCEDURE — 6370000000 HC RX 637 (ALT 250 FOR IP): Performed by: STUDENT IN AN ORGANIZED HEALTH CARE EDUCATION/TRAINING PROGRAM

## 2022-05-07 PROCEDURE — 6370000000 HC RX 637 (ALT 250 FOR IP): Performed by: EMERGENCY MEDICINE

## 2022-05-07 PROCEDURE — 51798 US URINE CAPACITY MEASURE: CPT

## 2022-05-07 PROCEDURE — 80048 BASIC METABOLIC PNL TOTAL CA: CPT

## 2022-05-07 PROCEDURE — 51701 INSERT BLADDER CATHETER: CPT

## 2022-05-07 PROCEDURE — 85027 COMPLETE CBC AUTOMATED: CPT

## 2022-05-07 PROCEDURE — 6370000000 HC RX 637 (ALT 250 FOR IP): Performed by: NURSE PRACTITIONER

## 2022-05-07 PROCEDURE — 2000000000 HC ICU R&B

## 2022-05-07 PROCEDURE — 93010 ELECTROCARDIOGRAM REPORT: CPT | Performed by: INTERNAL MEDICINE

## 2022-05-07 PROCEDURE — 6360000002 HC RX W HCPCS: Performed by: STUDENT IN AN ORGANIZED HEALTH CARE EDUCATION/TRAINING PROGRAM

## 2022-05-07 PROCEDURE — 93005 ELECTROCARDIOGRAM TRACING: CPT | Performed by: STUDENT IN AN ORGANIZED HEALTH CARE EDUCATION/TRAINING PROGRAM

## 2022-05-07 PROCEDURE — 6360000002 HC RX W HCPCS: Performed by: EMERGENCY MEDICINE

## 2022-05-07 PROCEDURE — 2580000003 HC RX 258: Performed by: EMERGENCY MEDICINE

## 2022-05-07 PROCEDURE — 2700000000 HC OXYGEN THERAPY PER DAY

## 2022-05-07 PROCEDURE — 36415 COLL VENOUS BLD VENIPUNCTURE: CPT

## 2022-05-07 PROCEDURE — 94003 VENT MGMT INPAT SUBQ DAY: CPT

## 2022-05-07 PROCEDURE — 94761 N-INVAS EAR/PLS OXIMETRY MLT: CPT

## 2022-05-07 RX ADMIN — FENTANYL CITRATE 50 MCG: 50 INJECTION, SOLUTION INTRAMUSCULAR; INTRAVENOUS at 11:00

## 2022-05-07 RX ADMIN — DIAZEPAM 10 MG: 5 TABLET ORAL at 00:15

## 2022-05-07 RX ADMIN — DOCUSATE SODIUM 50 MG AND SENNOSIDES 8.6 MG 1 TABLET: 8.6; 5 TABLET, FILM COATED ORAL at 08:11

## 2022-05-07 RX ADMIN — ACETAMINOPHEN 1000 MG: 500 TABLET ORAL at 06:04

## 2022-05-07 RX ADMIN — OLANZAPINE 5 MG: 5 TABLET, FILM COATED ORAL at 20:54

## 2022-05-07 RX ADMIN — PROPOFOL 30 MCG/KG/MIN: 10 INJECTION, EMULSION INTRAVENOUS at 03:48

## 2022-05-07 RX ADMIN — Medication 10 MG: at 20:54

## 2022-05-07 RX ADMIN — PROPOFOL 35 MCG/KG/MIN: 10 INJECTION, EMULSION INTRAVENOUS at 16:16

## 2022-05-07 RX ADMIN — PROPOFOL 45 MCG/KG/MIN: 10 INJECTION, EMULSION INTRAVENOUS at 06:32

## 2022-05-07 RX ADMIN — QUETIAPINE FUMARATE 100 MG: 100 TABLET ORAL at 20:54

## 2022-05-07 RX ADMIN — FAMOTIDINE 20 MG: 20 TABLET, FILM COATED ORAL at 09:08

## 2022-05-07 RX ADMIN — DIAZEPAM 10 MG: 5 TABLET ORAL at 06:05

## 2022-05-07 RX ADMIN — ACETAMINOPHEN 1000 MG: 500 TABLET ORAL at 20:53

## 2022-05-07 RX ADMIN — PROPOFOL 35 MCG/KG/MIN: 10 INJECTION, EMULSION INTRAVENOUS at 11:11

## 2022-05-07 RX ADMIN — OXYCODONE HYDROCHLORIDE 5 MG: 5 TABLET ORAL at 00:16

## 2022-05-07 RX ADMIN — Medication 10 MG: at 08:12

## 2022-05-07 RX ADMIN — QUETIAPINE FUMARATE 100 MG: 100 TABLET ORAL at 12:31

## 2022-05-07 RX ADMIN — QUETIAPINE FUMARATE 100 MG: 100 TABLET ORAL at 03:40

## 2022-05-07 RX ADMIN — FAMOTIDINE 20 MG: 20 TABLET, FILM COATED ORAL at 20:54

## 2022-05-07 RX ADMIN — DIAZEPAM 10 MG: 5 TABLET ORAL at 17:27

## 2022-05-07 RX ADMIN — CLONIDINE HYDROCHLORIDE 0.3 MG: 0.1 TABLET ORAL at 21:36

## 2022-05-07 RX ADMIN — OXYCODONE HYDROCHLORIDE 5 MG: 5 TABLET ORAL at 11:17

## 2022-05-07 RX ADMIN — ACETAMINOPHEN 1000 MG: 500 TABLET ORAL at 13:00

## 2022-05-07 RX ADMIN — POLYETHYLENE GLYCOL 3350 17 G: 17 POWDER, FOR SOLUTION ORAL at 08:12

## 2022-05-07 RX ADMIN — CLONIDINE HYDROCHLORIDE 0.3 MG: 0.1 TABLET ORAL at 13:00

## 2022-05-07 RX ADMIN — OXYCODONE HYDROCHLORIDE 5 MG: 5 TABLET ORAL at 17:27

## 2022-05-07 RX ADMIN — FOLIC ACID 1 MG: 1 TABLET ORAL at 09:08

## 2022-05-07 RX ADMIN — ENOXAPARIN SODIUM 30 MG: 100 INJECTION SUBCUTANEOUS at 09:08

## 2022-05-07 RX ADMIN — ENOXAPARIN SODIUM 30 MG: 100 INJECTION SUBCUTANEOUS at 20:53

## 2022-05-07 RX ADMIN — SODIUM CHLORIDE, PRESERVATIVE FREE 10 ML: 5 INJECTION INTRAVENOUS at 20:55

## 2022-05-07 RX ADMIN — OXYCODONE HYDROCHLORIDE 5 MG: 5 TABLET ORAL at 06:05

## 2022-05-07 RX ADMIN — OXYCODONE HYDROCHLORIDE 5 MG: 5 TABLET ORAL at 23:43

## 2022-05-07 RX ADMIN — FENTANYL CITRATE 50 MCG: 50 INJECTION, SOLUTION INTRAMUSCULAR; INTRAVENOUS at 15:05

## 2022-05-07 RX ADMIN — DIAZEPAM 10 MG: 5 TABLET ORAL at 11:16

## 2022-05-07 RX ADMIN — CLONIDINE HYDROCHLORIDE 0.3 MG: 0.1 TABLET ORAL at 06:09

## 2022-05-07 RX ADMIN — GABAPENTIN 600 MG: 600 TABLET ORAL at 06:05

## 2022-05-07 RX ADMIN — GABAPENTIN 600 MG: 600 TABLET ORAL at 20:54

## 2022-05-07 RX ADMIN — GABAPENTIN 600 MG: 600 TABLET ORAL at 12:31

## 2022-05-07 RX ADMIN — SODIUM CHLORIDE, PRESERVATIVE FREE 10 ML: 5 INJECTION INTRAVENOUS at 08:12

## 2022-05-07 RX ADMIN — DIAZEPAM 10 MG: 5 TABLET ORAL at 23:43

## 2022-05-07 RX ADMIN — ERYTHROMYCIN: 5 OINTMENT OPHTHALMIC at 20:15

## 2022-05-07 ASSESSMENT — PULMONARY FUNCTION TESTS
PIF_VALUE: 12
PIF_VALUE: 10
PIF_VALUE: 12
PIF_VALUE: 14
PIF_VALUE: 14
PIF_VALUE: 11
PIF_VALUE: 12
PIF_VALUE: 11
PIF_VALUE: 10
PIF_VALUE: 11
PIF_VALUE: 12
PIF_VALUE: 10
PIF_VALUE: 10
PIF_VALUE: 12

## 2022-05-07 ASSESSMENT — PAIN DESCRIPTION - DESCRIPTORS: DESCRIPTORS: ACHING

## 2022-05-07 ASSESSMENT — ENCOUNTER SYMPTOMS: TACHYPNEA: 1

## 2022-05-07 ASSESSMENT — PAIN DESCRIPTION - LOCATION: LOCATION: GENERALIZED

## 2022-05-07 NOTE — PLAN OF CARE
Problem: SKIN INTEGRITY  Goal: Skin integrity is maintained or improved  Outcome: Progressing     Problem: Non-Violent Restraints  Goal: Removal from restraints as soon as assessed to be safe  Outcome: Progressing     Problem: Non-Violent Restraints  Goal: No harm/injury to patient while restraints in use  Outcome: Progressing     Problem: Non-Violent Restraints  Goal: Patient's dignity will be maintained  Outcome: Progressing     Problem: Skin Integrity:  Goal: Will show no infection signs and symptoms  Description: Will show no infection signs and symptoms  Outcome: Progressing     Problem: Skin Integrity:  Goal: Absence of new skin breakdown  Description: Absence of new skin breakdown  Outcome: Progressing     Problem: Falls - Risk of:  Goal: Will remain free from falls  Description: Will remain free from falls  Outcome: Progressing     Problem: Falls - Risk of:  Goal: Absence of physical injury  Description: Absence of physical injury  Outcome: Progressing
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
## 2022-05-07 NOTE — CARE COORDINATION
Transitional Planning    Called Fernando [phone] at [facility] and left  requesting return phone call about status of pre cert.

## 2022-05-07 NOTE — PLAN OF CARE
Problem: OXYGENATION/RESPIRATORY FUNCTION  Goal: Patient will maintain patent airway  Outcome: Progressing  Goal: Patient will achieve/maintain normal respiratory rate/effort  Description: Respiratory rate and effort will be within normal limits for the patient  Outcome: Progressing     Problem: MECHANICAL VENTILATION  Goal: Patient will maintain patent airway  Outcome: Progressing  Goal: Oral health is maintained or improved  Outcome: Progressing  Goal: Ability to express needs and understand communication  Outcome: Progressing  Goal: Mobility/activity is maintained at optimum level for patient  Outcome: Progressing     Problem: SKIN INTEGRITY  Goal: Skin integrity is maintained or improved  5/7/2022 0915 by Karis Patel RCP  Outcome: Progressing  5/6/2022 2253 by Jose Groves RN  Outcome: Progressing

## 2022-05-08 PROCEDURE — 6370000000 HC RX 637 (ALT 250 FOR IP): Performed by: STUDENT IN AN ORGANIZED HEALTH CARE EDUCATION/TRAINING PROGRAM

## 2022-05-08 PROCEDURE — 2700000000 HC OXYGEN THERAPY PER DAY

## 2022-05-08 PROCEDURE — 94003 VENT MGMT INPAT SUBQ DAY: CPT

## 2022-05-08 PROCEDURE — 6370000000 HC RX 637 (ALT 250 FOR IP): Performed by: EMERGENCY MEDICINE

## 2022-05-08 PROCEDURE — 6360000002 HC RX W HCPCS: Performed by: EMERGENCY MEDICINE

## 2022-05-08 PROCEDURE — 93005 ELECTROCARDIOGRAM TRACING: CPT | Performed by: STUDENT IN AN ORGANIZED HEALTH CARE EDUCATION/TRAINING PROGRAM

## 2022-05-08 PROCEDURE — 6370000000 HC RX 637 (ALT 250 FOR IP): Performed by: NURSE PRACTITIONER

## 2022-05-08 PROCEDURE — 6360000002 HC RX W HCPCS: Performed by: STUDENT IN AN ORGANIZED HEALTH CARE EDUCATION/TRAINING PROGRAM

## 2022-05-08 PROCEDURE — 94761 N-INVAS EAR/PLS OXIMETRY MLT: CPT

## 2022-05-08 PROCEDURE — 2580000003 HC RX 258: Performed by: EMERGENCY MEDICINE

## 2022-05-08 PROCEDURE — 51798 US URINE CAPACITY MEASURE: CPT

## 2022-05-08 PROCEDURE — 51701 INSERT BLADDER CATHETER: CPT

## 2022-05-08 PROCEDURE — 2000000000 HC ICU R&B

## 2022-05-08 RX ADMIN — ACETAMINOPHEN 1000 MG: 500 TABLET ORAL at 13:42

## 2022-05-08 RX ADMIN — DIAZEPAM 10 MG: 5 TABLET ORAL at 05:57

## 2022-05-08 RX ADMIN — GABAPENTIN 600 MG: 600 TABLET ORAL at 06:18

## 2022-05-08 RX ADMIN — CLONIDINE HYDROCHLORIDE 0.3 MG: 0.1 TABLET ORAL at 06:18

## 2022-05-08 RX ADMIN — DIAZEPAM 10 MG: 5 TABLET ORAL at 23:56

## 2022-05-08 RX ADMIN — Medication 10 MG: at 20:00

## 2022-05-08 RX ADMIN — PROPOFOL 25 MCG/KG/MIN: 10 INJECTION, EMULSION INTRAVENOUS at 00:04

## 2022-05-08 RX ADMIN — QUETIAPINE FUMARATE 100 MG: 100 TABLET ORAL at 03:23

## 2022-05-08 RX ADMIN — SODIUM CHLORIDE, PRESERVATIVE FREE 10 ML: 5 INJECTION INTRAVENOUS at 21:00

## 2022-05-08 RX ADMIN — OXYCODONE HYDROCHLORIDE 5 MG: 5 TABLET ORAL at 23:56

## 2022-05-08 RX ADMIN — PROPOFOL 24.99 MCG/KG/MIN: 10 INJECTION, EMULSION INTRAVENOUS at 11:45

## 2022-05-08 RX ADMIN — FAMOTIDINE 20 MG: 20 TABLET, FILM COATED ORAL at 20:00

## 2022-05-08 RX ADMIN — FENTANYL CITRATE 50 MCG: 50 INJECTION, SOLUTION INTRAMUSCULAR; INTRAVENOUS at 04:00

## 2022-05-08 RX ADMIN — GABAPENTIN 600 MG: 600 TABLET ORAL at 13:42

## 2022-05-08 RX ADMIN — FENTANYL CITRATE 50 MCG: 50 INJECTION, SOLUTION INTRAMUSCULAR; INTRAVENOUS at 19:44

## 2022-05-08 RX ADMIN — ENOXAPARIN SODIUM 30 MG: 100 INJECTION SUBCUTANEOUS at 08:26

## 2022-05-08 RX ADMIN — CLONIDINE HYDROCHLORIDE 0.3 MG: 0.1 TABLET ORAL at 22:28

## 2022-05-08 RX ADMIN — ACETAMINOPHEN 1000 MG: 500 TABLET ORAL at 05:57

## 2022-05-08 RX ADMIN — OXYCODONE HYDROCHLORIDE 5 MG: 5 TABLET ORAL at 17:59

## 2022-05-08 RX ADMIN — FAMOTIDINE 20 MG: 20 TABLET, FILM COATED ORAL at 09:57

## 2022-05-08 RX ADMIN — DIAZEPAM 10 MG: 5 TABLET ORAL at 17:59

## 2022-05-08 RX ADMIN — OXYCODONE HYDROCHLORIDE 5 MG: 5 TABLET ORAL at 05:58

## 2022-05-08 RX ADMIN — DIAZEPAM 10 MG: 5 TABLET ORAL at 11:50

## 2022-05-08 RX ADMIN — PROPOFOL 25 MCG/KG/MIN: 10 INJECTION, EMULSION INTRAVENOUS at 18:51

## 2022-05-08 RX ADMIN — FOLIC ACID 1 MG: 1 TABLET ORAL at 08:26

## 2022-05-08 RX ADMIN — GABAPENTIN 600 MG: 600 TABLET ORAL at 20:00

## 2022-05-08 RX ADMIN — CLONIDINE HYDROCHLORIDE 0.3 MG: 0.1 TABLET ORAL at 13:42

## 2022-05-08 RX ADMIN — PROPOFOL 25 MCG/KG/MIN: 10 INJECTION, EMULSION INTRAVENOUS at 04:22

## 2022-05-08 RX ADMIN — OXYCODONE HYDROCHLORIDE 5 MG: 5 TABLET ORAL at 11:50

## 2022-05-08 RX ADMIN — OLANZAPINE 5 MG: 5 TABLET, FILM COATED ORAL at 20:00

## 2022-05-08 RX ADMIN — QUETIAPINE FUMARATE 100 MG: 100 TABLET ORAL at 20:00

## 2022-05-08 RX ADMIN — ACETAMINOPHEN 1000 MG: 500 TABLET ORAL at 22:28

## 2022-05-08 RX ADMIN — QUETIAPINE FUMARATE 100 MG: 100 TABLET ORAL at 10:38

## 2022-05-08 RX ADMIN — FENTANYL CITRATE 50 MCG: 50 INJECTION, SOLUTION INTRAMUSCULAR; INTRAVENOUS at 02:02

## 2022-05-08 RX ADMIN — ENOXAPARIN SODIUM 30 MG: 100 INJECTION SUBCUTANEOUS at 20:01

## 2022-05-08 RX ADMIN — ERYTHROMYCIN: 5 OINTMENT OPHTHALMIC at 20:01

## 2022-05-08 RX ADMIN — FENTANYL CITRATE 50 MCG: 50 INJECTION, SOLUTION INTRAMUSCULAR; INTRAVENOUS at 13:42

## 2022-05-08 ASSESSMENT — PULMONARY FUNCTION TESTS
PIF_VALUE: 15
PIF_VALUE: 11
PIF_VALUE: 16
PIF_VALUE: 10
PIF_VALUE: 15
PIF_VALUE: 10
PIF_VALUE: 13
PIF_VALUE: 13
PIF_VALUE: 19
PIF_VALUE: 15
PIF_VALUE: 10
PIF_VALUE: 15
PIF_VALUE: 22
PIF_VALUE: 15
PIF_VALUE: 18
PIF_VALUE: 17
PIF_VALUE: 10
PIF_VALUE: 15
PIF_VALUE: 18
PIF_VALUE: 12
PIF_VALUE: 18
PIF_VALUE: 10
PIF_VALUE: 12
PIF_VALUE: 19
PIF_VALUE: 19
PIF_VALUE: 16
PIF_VALUE: 10
PIF_VALUE: 18
PIF_VALUE: 9
PIF_VALUE: 23
PIF_VALUE: 8
PIF_VALUE: 16
PIF_VALUE: 11
PIF_VALUE: 14
PIF_VALUE: 15
PIF_VALUE: 13
PIF_VALUE: 18
PIF_VALUE: 20
PIF_VALUE: 18
PIF_VALUE: 11
PIF_VALUE: 10
PIF_VALUE: 16
PIF_VALUE: 16
PIF_VALUE: 24
PIF_VALUE: 12
PIF_VALUE: 11
PIF_VALUE: 9
PIF_VALUE: 9
PIF_VALUE: 12
PIF_VALUE: 15
PIF_VALUE: 12
PIF_VALUE: 18
PIF_VALUE: 7
PIF_VALUE: 11
PIF_VALUE: 8
PIF_VALUE: 12
PIF_VALUE: 17
PIF_VALUE: 9
PIF_VALUE: 15
PIF_VALUE: 16
PIF_VALUE: 10
PIF_VALUE: 9

## 2022-05-08 ASSESSMENT — PAIN - FUNCTIONAL ASSESSMENT: PAIN_FUNCTIONAL_ASSESSMENT: ACTIVITIES ARE NOT PREVENTED

## 2022-05-08 NOTE — PLAN OF CARE
Problem: OXYGENATION/RESPIRATORY FUNCTION  Goal: Patient will maintain patent airway  Outcome: Progressing  Goal: Patient will achieve/maintain normal respiratory rate/effort  Description: Respiratory rate and effort will be within normal limits for the patient  Outcome: Progressing     Problem: SKIN INTEGRITY  Goal: Skin integrity is maintained or improved  Outcome: Progressing     Problem: MECHANICAL VENTILATION  Goal: Patient will maintain patent airway  Outcome: Progressing  Goal: Oral health is maintained or improved  Outcome: Progressing  Goal: Ability to express needs and understand communication  Outcome: Progressing  Goal: Mobility/activity is maintained at optimum level for patient  Outcome: Progressing     Problem: Non-Violent Restraints  Goal: Removal from restraints as soon as assessed to be safe  Outcome: Progressing  Goal: No harm/injury to patient while restraints in use  Outcome: Progressing  Goal: Patient's dignity will be maintained  Outcome: Progressing     Problem: Skin Integrity:  Goal: Will show no infection signs and symptoms  Description: Will show no infection signs and symptoms  Outcome: Progressing  Goal: Absence of new skin breakdown  Description: Absence of new skin breakdown  Outcome: Progressing     Problem: Falls - Risk of:  Goal: Will remain free from falls  Description: Will remain free from falls  Outcome: Progressing  Goal: Absence of physical injury  Description: Absence of physical injury  Outcome: Progressing     Problem: Nutrition  Goal: Optimal nutrition therapy  Outcome: Progressing     Problem: Discharge Planning  Goal: Discharge to home or other facility with appropriate resources  Outcome: Progressing     Problem: Safety - Medical Restraint  Goal: Remains free of injury from restraints (Restraint for Interference with Medical Device)  Description: INTERVENTIONS:  1.  Determine that other, less restrictive measures have been tried or would not be effective before applying the restraint  2. Evaluate the patient's condition at the time of restraint application  3. Inform patient/family regarding the reason for restraint  4.  Q2H: Monitor safety, psychosocial status, comfort, nutrition and hydration  Outcome: Progressing  Flowsheets  Taken 5/8/2022 1800  Remains free of injury from restraints (restraint for interference with medical device): Every 2 hours: Monitor safety, psychosocial status, comfort, nutrition and hydration  Taken 5/8/2022 1600  Remains free of injury from restraints (restraint for interference with medical device): Every 2 hours: Monitor safety, psychosocial status, comfort, nutrition and hydration  Taken 5/8/2022 1200  Remains free of injury from restraints (restraint for interference with medical device): Every 2 hours: Monitor safety, psychosocial status, comfort, nutrition and hydration  Taken 5/8/2022 1000  Remains free of injury from restraints (restraint for interference with medical device):   Every 2 hours: Monitor safety, psychosocial status, comfort, nutrition and hydration   Determine that other, less restrictive measures have been tried or would not be effective before applying the restraint  Taken 5/8/2022 0800  Remains free of injury from restraints (restraint for interference with medical device):   Determine that other, less restrictive measures have been tried or would not be effective before applying the restraint   Evaluate the patient's condition at the time of restraint application   Every 2 hours: Monitor safety, psychosocial status, comfort, nutrition and hydration  Taken 5/8/2022 0700  Remains free of injury from restraints (restraint for interference with medical device):   Every 2 hours: Monitor safety, psychosocial status, comfort, nutrition and hydration   Inform patient/family regarding the reason for restraint     Problem: Pain  Goal: Verbalizes/displays adequate comfort level or baseline comfort level  Outcome: Progressing  Flowsheets (Taken 5/8/2022 0600 by Patria Tinoco RN)  Verbalizes/displays adequate comfort level or baseline comfort level: Assess pain using appropriate pain scale     Problem: ABCDS Injury Assessment  Goal: Absence of physical injury  Outcome: Progressing
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
## 2022-05-08 NOTE — PROGRESS NOTES
Occupational [address] [employer]  Occupational Therapy Not Seen Note    DATE: 2022    NAME: Saud Johnson  MRN: [id number]   : 1995      Patient not seen this date for Occupational Therapy due to:    Patient is not appropriate for active participation in OT evaluation/treatment at this time d/t sedated sec to agitation    Next Scheduled Treatment: Attempt     Electronically signed by WOLFGANG Joseph on 2022 at 3:00 PM

## 2022-05-08 NOTE — PLAN OF CARE
Problem: OXYGENATION/RESPIRATORY FUNCTION  Goal: Patient will maintain patent airway  Outcome: Progressing  Goal: Patient will achieve/maintain normal respiratory rate/effort  Description: Respiratory rate and effort will be within normal limits for the patient  Outcome: Progressing     Problem: SKIN INTEGRITY  Goal: Skin integrity is maintained or improved  Outcome: Progressing     Problem: MECHANICAL VENTILATION  Goal: Patient will maintain patent airway  Outcome: Progressing  Goal: Oral health is maintained or improved  Outcome: Progressing  Goal: Ability to express needs and understand communication  Outcome: Progressing  Goal: Mobility/activity is maintained at optimum level for patient  Outcome: Progressing     Problem: Non-Violent Restraints  Goal: Removal from restraints as soon as assessed to be safe  Outcome: Progressing  Goal: No harm/injury to patient while restraints in use  Outcome: Progressing  Goal: Patient's dignity will be maintained  Outcome: Progressing     Problem: Skin Integrity:  Goal: Will show no infection signs and symptoms  Description: Will show no infection signs and symptoms  Outcome: Progressing  Goal: Absence of new skin breakdown  Description: Absence of new skin breakdown  Outcome: Progressing     Problem: Falls - Risk of:  Goal: Will remain free from falls  Description: Will remain free from falls  Outcome: Progressing  Goal: Absence of physical injury  Description: Absence of physical injury  Outcome: Progressing     Problem: Nutrition  Goal: Optimal nutrition therapy  Outcome: Progressing     Problem: Discharge Planning  Goal: Discharge to home or other facility with appropriate resources  Outcome: Progressing  Flowsheets (Taken 5/7/2022 2000)  Discharge to home or other facility with appropriate resources:   Identify barriers to discharge with patient and caregiver   Arrange for needed discharge resources and transportation as appropriate   Identify discharge learning needs (meds, wound care, etc)   Arrange for interpreters to assist at discharge as needed   Refer to discharge planning if patient needs post-hospital services based on physician order or complex needs related to functional status, cognitive ability or social support system     Problem: Safety - Medical Restraint  Goal: Remains free of injury from restraints (Restraint for Interference with Medical Device)  Description: INTERVENTIONS:  1. Determine that other, less restrictive measures have been tried or would not be effective before applying the restraint  2. Evaluate the patient's condition at the time of restraint application  3. Inform patient/family regarding the reason for restraint  4.  Q2H: Monitor safety, psychosocial status, comfort, nutrition and hydration  Outcome: Progressing  Flowsheets  Taken 5/8/2022 0000 by Isaias Rangel RN  Remains free of injury from restraints (restraint for interference with medical device):   Determine that other, less restrictive measures have been tried or would not be effective before applying the restraint   Evaluate the patient's condition at the time of restraint application   Inform patient/family regarding the reason for restraint   Every 2 hours: Monitor safety, psychosocial status, comfort, nutrition and hydration  Taken 5/7/2022 2200 by Isaias Rangel RN  Remains free of injury from restraints (restraint for interference with medical device):   Every 2 hours: Monitor safety, psychosocial status, comfort, nutrition and hydration   Inform patient/family regarding the reason for restraint   Evaluate the patient's condition at the time of restraint application   Determine that other, less restrictive measures have been tried or would not be effective before applying the restraint  Taken 5/7/2022 2000 by Isaias Rangel RN  Remains free of injury from restraints (restraint for interference with medical device):   Every 2 hours: Monitor safety, psychosocial status, comfort, nutrition and hydration   Evaluate the patient's condition at the time of restraint application  Taken 2/4/2274 1800 by Madelaine Cristobal RN  Remains free of injury from restraints (restraint for interference with medical device): Every 2 hours: Monitor safety, psychosocial status, comfort, nutrition and hydration  Taken 5/7/2022 1600 by Madelaine Cristobal RN  Remains free of injury from restraints (restraint for interference with medical device): Every 2 hours: Monitor safety, psychosocial status, comfort, nutrition and hydration  Taken 5/7/2022 1400 by Madelaine Cristobal RN  Remains free of injury from restraints (restraint for interference with medical device): Every 2 hours: Monitor safety, psychosocial status, comfort, nutrition and hydration  Taken 5/7/2022 1200 by Madelaine Cristobal RN  Remains free of injury from restraints (restraint for interference with medical device): Every 2 hours: Monitor safety, psychosocial status, comfort, nutrition and hydration     Problem: Pain  Goal: Verbalizes/displays adequate comfort level or baseline comfort level  Outcome: Progressing  Flowsheets (Taken 5/7/2022 2000)  Verbalizes/displays adequate comfort level or baseline comfort level:   Assess pain using appropriate pain scale   Administer analgesics based on type and severity of pain and evaluate response   Implement non-pharmacological measures as appropriate and evaluate response   Consider cultural and social influences on pain and pain management   Notify Licensed Independent Practitioner if interventions unsuccessful or patient reports new pain   Encourage patient to monitor pain and request assistance     Problem: ABCDS Injury Assessment  Goal: Absence of physical injury  Outcome: Progressing

## 2022-05-09 LAB
ANION GAP SERPL CALCULATED.3IONS-SCNC: 9 MMOL/L (ref 9–17)
BUN BLDV-MCNC: 28 MG/DL (ref 6–20)
CALCIUM SERPL-MCNC: 9.4 MG/DL (ref 8.6–10.4)
CHLORIDE BLD-SCNC: 102 MMOL/L (ref 98–107)
CO2: 28 MMOL/L (ref 20–31)
CREAT SERPL-MCNC: 0.66 MG/DL (ref 0.7–1.2)
EKG ATRIAL RATE: 102 BPM
EKG ATRIAL RATE: 92 BPM
EKG P AXIS: 38 DEGREES
EKG P AXIS: 48 DEGREES
EKG P-R INTERVAL: 156 MS
EKG P-R INTERVAL: 174 MS
EKG Q-T INTERVAL: 358 MS
EKG Q-T INTERVAL: 372 MS
EKG QRS DURATION: 100 MS
EKG QRS DURATION: 98 MS
EKG QTC CALCULATION (BAZETT): 460 MS
EKG QTC CALCULATION (BAZETT): 466 MS
EKG R AXIS: 5 DEGREES
EKG R AXIS: 6 DEGREES
EKG T AXIS: 18 DEGREES
EKG T AXIS: 44 DEGREES
EKG VENTRICULAR RATE: 102 BPM
EKG VENTRICULAR RATE: 92 BPM
GFR AFRICAN AMERICAN: >60 ML/MIN
GFR NON-AFRICAN AMERICAN: >60 ML/MIN
GFR SERPL CREATININE-BSD FRML MDRD: ABNORMAL ML/MIN/{1.73_M2}
GLUCOSE BLD-MCNC: 102 MG/DL (ref 70–99)
HCT VFR BLD CALC: 31.9 % (ref 40.7–50.3)
HEMOGLOBIN: 10.8 G/DL (ref 13–17)
MCH RBC QN AUTO: 32.4 PG (ref 25.2–33.5)
MCHC RBC AUTO-ENTMCNC: 33.9 G/DL (ref 28.4–34.8)
MCV RBC AUTO: 95.8 FL (ref 82.6–102.9)
NRBC AUTOMATED: 0 PER 100 WBC
PDW BLD-RTO: 12.3 % (ref 11.8–14.4)
PLATELET # BLD: 253 K/UL (ref 138–453)
PMV BLD AUTO: 9.6 FL (ref 8.1–13.5)
POTASSIUM SERPL-SCNC: 4 MMOL/L (ref 3.7–5.3)
RBC # BLD: 3.33 M/UL (ref 4.21–5.77)
SODIUM BLD-SCNC: 139 MMOL/L (ref 135–144)
WBC # BLD: 6.5 K/UL (ref 3.5–11.3)

## 2022-05-09 PROCEDURE — 6370000000 HC RX 637 (ALT 250 FOR IP): Performed by: EMERGENCY MEDICINE

## 2022-05-09 PROCEDURE — 6370000000 HC RX 637 (ALT 250 FOR IP): Performed by: NURSE PRACTITIONER

## 2022-05-09 PROCEDURE — 2580000003 HC RX 258: Performed by: EMERGENCY MEDICINE

## 2022-05-09 PROCEDURE — 51701 INSERT BLADDER CATHETER: CPT

## 2022-05-09 PROCEDURE — 2000000000 HC ICU R&B

## 2022-05-09 PROCEDURE — 80048 BASIC METABOLIC PNL TOTAL CA: CPT

## 2022-05-09 PROCEDURE — 94003 VENT MGMT INPAT SUBQ DAY: CPT

## 2022-05-09 PROCEDURE — 51798 US URINE CAPACITY MEASURE: CPT

## 2022-05-09 PROCEDURE — 6360000002 HC RX W HCPCS: Performed by: EMERGENCY MEDICINE

## 2022-05-09 PROCEDURE — 6370000000 HC RX 637 (ALT 250 FOR IP): Performed by: STUDENT IN AN ORGANIZED HEALTH CARE EDUCATION/TRAINING PROGRAM

## 2022-05-09 PROCEDURE — 94761 N-INVAS EAR/PLS OXIMETRY MLT: CPT

## 2022-05-09 PROCEDURE — 6360000002 HC RX W HCPCS: Performed by: STUDENT IN AN ORGANIZED HEALTH CARE EDUCATION/TRAINING PROGRAM

## 2022-05-09 PROCEDURE — 85027 COMPLETE CBC AUTOMATED: CPT

## 2022-05-09 PROCEDURE — 2700000000 HC OXYGEN THERAPY PER DAY

## 2022-05-09 PROCEDURE — 93005 ELECTROCARDIOGRAM TRACING: CPT | Performed by: STUDENT IN AN ORGANIZED HEALTH CARE EDUCATION/TRAINING PROGRAM

## 2022-05-09 PROCEDURE — 36415 COLL VENOUS BLD VENIPUNCTURE: CPT

## 2022-05-09 RX ORDER — DIAZEPAM 5 MG/1
5 TABLET ORAL EVERY 6 HOURS SCHEDULED
Status: DISCONTINUED | OUTPATIENT
Start: 2022-05-09 | End: 2022-05-10

## 2022-05-09 RX ADMIN — PROPOFOL 25 MCG/KG/MIN: 10 INJECTION, EMULSION INTRAVENOUS at 20:39

## 2022-05-09 RX ADMIN — DIAZEPAM 5 MG: 5 TABLET ORAL at 17:56

## 2022-05-09 RX ADMIN — OXYCODONE HYDROCHLORIDE 5 MG: 5 TABLET ORAL at 17:55

## 2022-05-09 RX ADMIN — DIAZEPAM 5 MG: 5 TABLET ORAL at 23:32

## 2022-05-09 RX ADMIN — CLONIDINE HYDROCHLORIDE 0.3 MG: 0.1 TABLET ORAL at 14:22

## 2022-05-09 RX ADMIN — QUETIAPINE FUMARATE 150 MG: 100 TABLET ORAL at 19:30

## 2022-05-09 RX ADMIN — Medication 10 MG: at 20:41

## 2022-05-09 RX ADMIN — FAMOTIDINE 20 MG: 20 TABLET, FILM COATED ORAL at 20:41

## 2022-05-09 RX ADMIN — DIAZEPAM 10 MG: 5 TABLET ORAL at 05:21

## 2022-05-09 RX ADMIN — PROPOFOL 25 MCG/KG/MIN: 10 INJECTION, EMULSION INTRAVENOUS at 14:40

## 2022-05-09 RX ADMIN — QUETIAPINE FUMARATE 100 MG: 100 TABLET ORAL at 02:54

## 2022-05-09 RX ADMIN — GABAPENTIN 600 MG: 600 TABLET ORAL at 20:41

## 2022-05-09 RX ADMIN — ENOXAPARIN SODIUM 30 MG: 100 INJECTION SUBCUTANEOUS at 10:51

## 2022-05-09 RX ADMIN — ACETAMINOPHEN 1000 MG: 500 TABLET ORAL at 21:00

## 2022-05-09 RX ADMIN — CLONIDINE HYDROCHLORIDE 0.3 MG: 0.1 TABLET ORAL at 21:00

## 2022-05-09 RX ADMIN — SODIUM CHLORIDE, PRESERVATIVE FREE 10 ML: 5 INJECTION INTRAVENOUS at 11:00

## 2022-05-09 RX ADMIN — GABAPENTIN 600 MG: 600 TABLET ORAL at 05:22

## 2022-05-09 RX ADMIN — OXYCODONE HYDROCHLORIDE 5 MG: 5 TABLET ORAL at 05:30

## 2022-05-09 RX ADMIN — DOCUSATE SODIUM 50 MG AND SENNOSIDES 8.6 MG 1 TABLET: 8.6; 5 TABLET, FILM COATED ORAL at 20:40

## 2022-05-09 RX ADMIN — ENOXAPARIN SODIUM 30 MG: 100 INJECTION SUBCUTANEOUS at 20:40

## 2022-05-09 RX ADMIN — FAMOTIDINE 20 MG: 20 TABLET, FILM COATED ORAL at 10:51

## 2022-05-09 RX ADMIN — ACETAMINOPHEN 1000 MG: 500 TABLET ORAL at 14:22

## 2022-05-09 RX ADMIN — PROPOFOL 25 MCG/KG/MIN: 10 INJECTION, EMULSION INTRAVENOUS at 02:34

## 2022-05-09 RX ADMIN — OXYCODONE HYDROCHLORIDE 5 MG: 5 TABLET ORAL at 11:59

## 2022-05-09 RX ADMIN — FENTANYL CITRATE 50 MCG: 50 INJECTION, SOLUTION INTRAMUSCULAR; INTRAVENOUS at 10:57

## 2022-05-09 RX ADMIN — PROPOFOL 25 MCG/KG/MIN: 10 INJECTION, EMULSION INTRAVENOUS at 07:55

## 2022-05-09 RX ADMIN — OLANZAPINE 5 MG: 5 TABLET, FILM COATED ORAL at 20:41

## 2022-05-09 RX ADMIN — CLONIDINE HYDROCHLORIDE 0.3 MG: 0.1 TABLET ORAL at 05:22

## 2022-05-09 RX ADMIN — FENTANYL CITRATE 50 MCG: 50 INJECTION, SOLUTION INTRAMUSCULAR; INTRAVENOUS at 04:25

## 2022-05-09 RX ADMIN — GABAPENTIN 600 MG: 600 TABLET ORAL at 14:22

## 2022-05-09 RX ADMIN — FOLIC ACID 1 MG: 1 TABLET ORAL at 08:43

## 2022-05-09 RX ADMIN — FENTANYL CITRATE 50 MCG: 50 INJECTION, SOLUTION INTRAMUSCULAR; INTRAVENOUS at 08:42

## 2022-05-09 RX ADMIN — OXYCODONE HYDROCHLORIDE 5 MG: 5 TABLET ORAL at 23:32

## 2022-05-09 RX ADMIN — ACETAMINOPHEN 1000 MG: 500 TABLET ORAL at 05:22

## 2022-05-09 RX ADMIN — SODIUM CHLORIDE, PRESERVATIVE FREE 10 ML: 5 INJECTION INTRAVENOUS at 20:42

## 2022-05-09 RX ADMIN — DIAZEPAM 10 MG: 5 TABLET ORAL at 12:00

## 2022-05-09 ASSESSMENT — PULMONARY FUNCTION TESTS
PIF_VALUE: 12
PIF_VALUE: 12
PIF_VALUE: 11
PIF_VALUE: 17
PIF_VALUE: 14
PIF_VALUE: 9
PIF_VALUE: 19
PIF_VALUE: 18
PIF_VALUE: 11
PIF_VALUE: 17
PIF_VALUE: 15
PIF_VALUE: 18
PIF_VALUE: 11
PIF_VALUE: 17
PIF_VALUE: 26
PIF_VALUE: 5
PIF_VALUE: 11
PIF_VALUE: 13
PIF_VALUE: 14
PIF_VALUE: 17
PIF_VALUE: 15
PIF_VALUE: 21
PIF_VALUE: 17
PIF_VALUE: 13
PIF_VALUE: 11
PIF_VALUE: 15
PIF_VALUE: 9
PIF_VALUE: 19
PIF_VALUE: 17
PIF_VALUE: 11
PIF_VALUE: 12
PIF_VALUE: 17
PIF_VALUE: 17
PIF_VALUE: 35
PIF_VALUE: 17

## 2022-05-09 ASSESSMENT — PAIN SCALES - WONG BAKER

## 2022-05-09 NOTE — PLAN OF CARE
Problem: OXYGENATION/RESPIRATORY FUNCTION  Goal: Patient will maintain patent airway  5/9/2022 0309 by Dedrick Richardson, RN  Outcome: Progressing  5/8/2022 1813 by Rory Castaneda RN  Outcome: Progressing  Goal: Patient will achieve/maintain normal respiratory rate/effort  Description: Respiratory rate and effort will be within normal limits for the patient  5/9/2022 0309 by Dedrick Richardson, RN  Outcome: Progressing  5/8/2022 1813 by Rory Castaneda RN  Outcome: Progressing     Problem: SKIN INTEGRITY  Goal: Skin integrity is maintained or improved  5/9/2022 0309 by Dedrick Richardson, RN  Outcome: Progressing  5/8/2022 1813 by Rory Castaneda RN  Outcome: Progressing     Problem: MECHANICAL VENTILATION  Goal: Patient will maintain patent airway  5/9/2022 0309 by Dedrick Richardson, RN  Outcome: Progressing  5/8/2022 1813 by Rory Castaneda RN  Outcome: Progressing  Goal: Oral health is maintained or improved  5/9/2022 0309 by Dedrick Richardson, RN  Outcome: Progressing  5/8/2022 1813 by Rory Castaneda RN  Outcome: Progressing  Goal: Ability to express needs and understand communication  5/9/2022 0309 by Dedrick Richardson, RN  Outcome: Progressing  5/8/2022 1813 by Rory Castaneda RN  Outcome: Progressing  Goal: Mobility/activity is maintained at optimum level for patient  5/9/2022 0309 by Dedrick Richardson, RN  Outcome: Progressing  5/8/2022 1813 by Rory Castaneda RN  Outcome: Progressing     Problem: Non-Violent Restraints  Goal: Removal from restraints as soon as assessed to be safe  5/9/2022 0309 by Dedrick Richardson, RN  Outcome: Not Progressing  5/8/2022 1813 by Rory Castaneda RN  Outcome: Progressing  Goal: No harm/injury to patient while restraints in use  5/9/2022 0309 by Dedrick Richardson, RN  Outcome: Progressing  5/8/2022 1813 by Rory Castaneda RN  Outcome: Progressing  Goal: Patient's dignity will be maintained  5/9/2022 0309 by Dedrick Richardson, RN  Outcome: Progressing  5/8/2022 1813 by Rory Castaneda RN  Outcome: Progressing     Problem: Skin Integrity:  Goal: Will show no infection signs and symptoms  Description: Will show no infection signs and symptoms  5/9/2022 0309 by Fannie Connors RN  Outcome: Progressing  5/8/2022 1813 by Jahaira Arellano RN  Outcome: Progressing  Goal: Absence of new skin breakdown  Description: Absence of new skin breakdown  5/9/2022 0309 by Fannie Connors RN  Outcome: Progressing  5/8/2022 1813 by Jahaira Arellano RN  Outcome: Progressing     Problem: Falls - Risk of:  Goal: Will remain free from falls  Description: Will remain free from falls  5/9/2022 0309 by Fannie Connors RN  Outcome: Progressing  5/8/2022 1813 by Jahaira Arellano RN  Outcome: Progressing  Goal: Absence of physical injury  Description: Absence of physical injury  5/9/2022 0309 by Fannie Connors RN  Outcome: Progressing  5/8/2022 1813 by Jahaira Arellano RN  Outcome: Progressing     Problem: Nutrition  Goal: Optimal nutrition therapy  5/9/2022 0309 by Fannie Connors RN  Outcome: Progressing  5/8/2022 1813 by Jahaira Arellano RN  Outcome: Progressing     Problem: Discharge Planning  Goal: Discharge to home or other facility with appropriate resources  5/9/2022 0309 by Fannie Connors RN  Outcome: Progressing  Flowsheets (Taken 5/8/2022 2000)  Discharge to home or other facility with appropriate resources:   Identify barriers to discharge with patient and caregiver   Arrange for needed discharge resources and transportation as appropriate   Identify discharge learning needs (meds, wound care, etc)   Arrange for interpreters to assist at discharge as needed   Refer to discharge planning if patient needs post-hospital services based on physician order or complex needs related to functional status, cognitive ability or social support system  5/8/2022 1813 by Jahaira Arellano RN  Outcome: Progressing     Problem: Safety - Medical Restraint  Goal: Remains free of injury from restraints (Restraint for Interference with Medical Device)  Description: INTERVENTIONS:  1. Determine that other, less restrictive measures have been tried or would not be effective before applying the restraint  2. Evaluate the patient's condition at the time of restraint application  3. Inform patient/family regarding the reason for restraint  4.  Q2H: Monitor safety, psychosocial status, comfort, nutrition and hydration  5/9/2022 0309 by Maria G Ge, RN  Outcome: Progressing  Flowsheets  Taken 5/9/2022 0200  Remains free of injury from restraints (restraint for interference with medical device):   Determine that other, less restrictive measures have been tried or would not be effective before applying the restraint   Evaluate the patient's condition at the time of restraint application   Inform patient/family regarding the reason for restraint   Every 2 hours: Monitor safety, psychosocial status, comfort, nutrition and hydration  Taken 5/9/2022 0000  Remains free of injury from restraints (restraint for interference with medical device):   Determine that other, less restrictive measures have been tried or would not be effective before applying the restraint   Evaluate the patient's condition at the time of restraint application   Inform patient/family regarding the reason for restraint   Every 2 hours: Monitor safety, psychosocial status, comfort, nutrition and hydration  Taken 5/8/2022 2200  Remains free of injury from restraints (restraint for interference with medical device):   Determine that other, less restrictive measures have been tried or would not be effective before applying the restraint   Evaluate the patient's condition at the time of restraint application   Inform patient/family regarding the reason for restraint   Every 2 hours: Monitor safety, psychosocial status, comfort, nutrition and hydration  Taken 5/8/2022 2000  Remains free of injury from restraints (restraint for interference with medical device):   Determine that other, less restrictive measures have been tried or would not be effective before applying the restraint   Evaluate the patient's condition at the time of restraint application   Inform patient/family regarding the reason for restraint   Every 2 hours: Monitor safety, psychosocial status, comfort, nutrition and hydration  5/8/2022 1813 by Lauren Qureshi RN  Outcome: Progressing  Flowsheets  Taken 5/8/2022 1800  Remains free of injury from restraints (restraint for interference with medical device): Every 2 hours: Monitor safety, psychosocial status, comfort, nutrition and hydration  Taken 5/8/2022 1600  Remains free of injury from restraints (restraint for interference with medical device): Every 2 hours: Monitor safety, psychosocial status, comfort, nutrition and hydration  Taken 5/8/2022 1400  Remains free of injury from restraints (restraint for interference with medical device): Every 2 hours: Monitor safety, psychosocial status, comfort, nutrition and hydration  Taken 5/8/2022 1200  Remains free of injury from restraints (restraint for interference with medical device): Every 2 hours: Monitor safety, psychosocial status, comfort, nutrition and hydration  Taken 5/8/2022 1000  Remains free of injury from restraints (restraint for interference with medical device):   Every 2 hours: Monitor safety, psychosocial status, comfort, nutrition and hydration   Determine that other, less restrictive measures have been tried or would not be effective before applying the restraint  Taken 5/8/2022 0800  Remains free of injury from restraints (restraint for interference with medical device):   Determine that other, less restrictive measures have been tried or would not be effective before applying the restraint   Evaluate the patient's condition at the time of restraint application   Every 2 hours: Monitor safety, psychosocial status, comfort, nutrition and hydration  Taken 5/8/2022 0700  Remains free of injury from restraints (restraint for interference with medical device):   Every 2 hours: Monitor safety, psychosocial status, comfort, nutrition and hydration   Inform patient/family regarding the reason for restraint     Problem: Pain  Goal: Verbalizes/displays adequate comfort level or baseline comfort level  5/9/2022 0309 by Nely Ho RN  Outcome: Progressing  Flowsheets  Taken 5/9/2022 0000  Verbalizes/displays adequate comfort level or baseline comfort level:   Assess pain using appropriate pain scale   Administer analgesics based on type and severity of pain and evaluate response   Consider cultural and social influences on pain and pain management   Implement non-pharmacological measures as appropriate and evaluate response   Notify Licensed Independent Practitioner if interventions unsuccessful or patient reports new pain  Taken 5/8/2022 1900  Verbalizes/displays adequate comfort level or baseline comfort level:   Assess pain using appropriate pain scale   Encourage patient to monitor pain and request assistance   Implement non-pharmacological measures as appropriate and evaluate response  5/8/2022 1813 by Mao Russ RN  Outcome: Progressing  Flowsheets (Taken 5/8/2022 0600 by Nely Ho RN)  Verbalizes/displays adequate comfort level or baseline comfort level: Assess pain using appropriate pain scale     Problem: ABCDS Injury Assessment  Goal: Absence of physical injury  5/9/2022 0309 by Nely Ho RN  Outcome: Progressing  5/8/2022 1813 by Mao Russ RN  Outcome: Progressing
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
## 2022-05-09 NOTE — PROGRESS NOTES
Pt restless. Shaking head yes and no to questions but not following directions consistently. Continue to monitor for safety and need for restraints.

## 2022-05-09 NOTE — PROGRESS NOTES
Pt girlfriend at bedside with pt mother. Pt calm with mother and girlfriend at bedside. Continues with flatulence but no stool at this time.  Tube feed bolus given and flushed GTube Pt continues with speaking valve in place but most words are not understandable

## 2022-05-09 NOTE — PLAN OF CARE
Problem: OXYGENATION/RESPIRATORY FUNCTION  Goal: Patient will maintain patent airway  5/9/2022 0746 by Lexi Medina RCP  Outcome: Progressing  5/9/2022 0309 by Josep Nathan RN  Outcome: Progressing  5/8/2022 1813 by Robert Moqsuera RN  Outcome: Progressing  Goal: Patient will achieve/maintain normal respiratory rate/effort  Description: Respiratory rate and effort will be within normal limits for the patient  5/9/2022 0746 by Lexi Medina RCP  Outcome: Progressing  5/9/2022 0309 by Josep Nathan RN  Outcome: Progressing  5/8/2022 1813 by Robert Mosquera RN  Outcome: Progressing     Problem: MECHANICAL VENTILATION  Goal: Patient will maintain patent airway  5/9/2022 0746 by Lexi Medina RCP  Outcome: Progressing  5/9/2022 0309 by Josep Nathan RN  Outcome: Progressing  5/8/2022 1813 by Robert Mosquera RN  Outcome: Progressing  Goal: Oral health is maintained or improved  5/9/2022 0746 by Lexi Medina RCP  Outcome: Progressing  5/9/2022 0309 by Josep Nathan RN  Outcome: Progressing  5/8/2022 1813 by Robert Mosquera RN  Outcome: Progressing  Goal: Ability to express needs and understand communication  5/9/2022 0746 by Lexi Medina RCP  Outcome: Progressing  5/9/2022 0309 by Josep Nathan RN  Outcome: Progressing  5/8/2022 1813 by Robert Mosquera RN  Outcome: Progressing  Goal: Mobility/activity is maintained at optimum level for patient  5/9/2022 0746 by Lexi Medina RCP  Outcome: Progressing  5/9/2022 0309 by Josep Nathan RN  Outcome: Progressing  5/8/2022 1813 by Robert Mosquera RN  Outcome: Progressing     Problem: SKIN INTEGRITY  Goal: Skin integrity is maintained or improved  5/9/2022 0746 by Lexi Medina RCP  Outcome: Progressing  5/9/2022 0309 by Josep Nathan RN  Outcome: Progressing  5/8/2022 1813 by Robert Mosquera RN  Outcome: Progressing

## 2022-05-09 NOTE — CARE COORDINATION
Called Merlene at [facility], left voicemail asking for an update on precert. Awaiting return call. Tony Pearson from [facility] returned call stating that they just check on referral and it is still pending. [id number] Received a call from Paco sanford [facility] she states that she expects an answered  Today. She asked for updated clinicals . Clinicals faxed.

## 2022-05-10 ENCOUNTER — APPOINTMENT (OUTPATIENT)
Dept: CT IMAGING | Age: 27
DRG: 005 | End: 2022-05-10
Payer: MEDICAID

## 2022-05-10 LAB
EKG ATRIAL RATE: 109 BPM
EKG P AXIS: 50 DEGREES
EKG P-R INTERVAL: 150 MS
EKG Q-T INTERVAL: 340 MS
EKG QRS DURATION: 98 MS
EKG QTC CALCULATION (BAZETT): 457 MS
EKG R AXIS: 10 DEGREES
EKG T AXIS: 25 DEGREES
EKG VENTRICULAR RATE: 109 BPM

## 2022-05-10 PROCEDURE — 2580000003 HC RX 258: Performed by: EMERGENCY MEDICINE

## 2022-05-10 PROCEDURE — APPNB30 APP NON BILLABLE TIME 0-30 MINS: Performed by: REGISTERED NURSE

## 2022-05-10 PROCEDURE — 6360000002 HC RX W HCPCS: Performed by: STUDENT IN AN ORGANIZED HEALTH CARE EDUCATION/TRAINING PROGRAM

## 2022-05-10 PROCEDURE — 72125 CT NECK SPINE W/O DYE: CPT

## 2022-05-10 PROCEDURE — 6360000002 HC RX W HCPCS: Performed by: EMERGENCY MEDICINE

## 2022-05-10 PROCEDURE — 94003 VENT MGMT INPAT SUBQ DAY: CPT

## 2022-05-10 PROCEDURE — 51701 INSERT BLADDER CATHETER: CPT

## 2022-05-10 PROCEDURE — 6370000000 HC RX 637 (ALT 250 FOR IP): Performed by: STUDENT IN AN ORGANIZED HEALTH CARE EDUCATION/TRAINING PROGRAM

## 2022-05-10 PROCEDURE — 51798 US URINE CAPACITY MEASURE: CPT

## 2022-05-10 PROCEDURE — 2700000000 HC OXYGEN THERAPY PER DAY

## 2022-05-10 PROCEDURE — 93005 ELECTROCARDIOGRAM TRACING: CPT | Performed by: STUDENT IN AN ORGANIZED HEALTH CARE EDUCATION/TRAINING PROGRAM

## 2022-05-10 PROCEDURE — 2000000000 HC ICU R&B

## 2022-05-10 PROCEDURE — 94761 N-INVAS EAR/PLS OXIMETRY MLT: CPT

## 2022-05-10 PROCEDURE — 6370000000 HC RX 637 (ALT 250 FOR IP): Performed by: EMERGENCY MEDICINE

## 2022-05-10 PROCEDURE — 97110 THERAPEUTIC EXERCISES: CPT

## 2022-05-10 RX ORDER — FENTANYL CITRATE 50 UG/ML
50 INJECTION, SOLUTION INTRAMUSCULAR; INTRAVENOUS EVERY 4 HOURS PRN
Status: DISCONTINUED | OUTPATIENT
Start: 2022-05-10 | End: 2022-05-11

## 2022-05-10 RX ORDER — GABAPENTIN 600 MG/1
300 TABLET ORAL EVERY 8 HOURS
Status: DISCONTINUED | OUTPATIENT
Start: 2022-05-10 | End: 2022-05-11

## 2022-05-10 RX ORDER — DIAZEPAM 5 MG/1
5 TABLET ORAL EVERY 8 HOURS PRN
Status: DISCONTINUED | OUTPATIENT
Start: 2022-05-10 | End: 2022-05-12 | Stop reason: HOSPADM

## 2022-05-10 RX ORDER — OXYCODONE HYDROCHLORIDE 5 MG/1
5 TABLET ORAL EVERY 8 HOURS PRN
Status: DISCONTINUED | OUTPATIENT
Start: 2022-05-10 | End: 2022-05-11

## 2022-05-10 RX ADMIN — QUETIAPINE FUMARATE 150 MG: 100 TABLET ORAL at 02:11

## 2022-05-10 RX ADMIN — DIAZEPAM 5 MG: 5 TABLET ORAL at 19:50

## 2022-05-10 RX ADMIN — FENTANYL CITRATE 50 MCG: 50 INJECTION, SOLUTION INTRAMUSCULAR; INTRAVENOUS at 06:11

## 2022-05-10 RX ADMIN — OXYCODONE HYDROCHLORIDE 5 MG: 5 TABLET ORAL at 10:57

## 2022-05-10 RX ADMIN — CLONIDINE HYDROCHLORIDE 0.3 MG: 0.1 TABLET ORAL at 05:14

## 2022-05-10 RX ADMIN — Medication 10 MG: at 22:10

## 2022-05-10 RX ADMIN — FENTANYL CITRATE 50 MCG: 50 INJECTION, SOLUTION INTRAMUSCULAR; INTRAVENOUS at 03:45

## 2022-05-10 RX ADMIN — Medication 10 MG: at 07:59

## 2022-05-10 RX ADMIN — PROPOFOL 35 MCG/KG/MIN: 10 INJECTION, EMULSION INTRAVENOUS at 12:50

## 2022-05-10 RX ADMIN — GABAPENTIN 600 MG: 600 TABLET ORAL at 05:14

## 2022-05-10 RX ADMIN — CLONIDINE HYDROCHLORIDE 0.3 MG: 0.1 TABLET ORAL at 07:57

## 2022-05-10 RX ADMIN — FAMOTIDINE 20 MG: 20 TABLET, FILM COATED ORAL at 21:19

## 2022-05-10 RX ADMIN — PROPOFOL 15 MCG/KG/MIN: 10 INJECTION, EMULSION INTRAVENOUS at 20:47

## 2022-05-10 RX ADMIN — GABAPENTIN 300 MG: 600 TABLET ORAL at 21:19

## 2022-05-10 RX ADMIN — ENOXAPARIN SODIUM 30 MG: 100 INJECTION SUBCUTANEOUS at 21:19

## 2022-05-10 RX ADMIN — FOLIC ACID 1 MG: 1 TABLET ORAL at 07:58

## 2022-05-10 RX ADMIN — SODIUM CHLORIDE, PRESERVATIVE FREE 10 ML: 5 INJECTION INTRAVENOUS at 21:20

## 2022-05-10 RX ADMIN — QUETIAPINE FUMARATE 150 MG: 100 TABLET ORAL at 17:52

## 2022-05-10 RX ADMIN — DOCUSATE SODIUM 50 MG AND SENNOSIDES 8.6 MG 1 TABLET: 8.6; 5 TABLET, FILM COATED ORAL at 07:57

## 2022-05-10 RX ADMIN — DIAZEPAM 5 MG: 5 TABLET ORAL at 05:14

## 2022-05-10 RX ADMIN — PROPOFOL 25 MCG/KG/MIN: 10 INJECTION, EMULSION INTRAVENOUS at 02:43

## 2022-05-10 RX ADMIN — OXYCODONE HYDROCHLORIDE 5 MG: 5 TABLET ORAL at 05:19

## 2022-05-10 RX ADMIN — ACETAMINOPHEN 1000 MG: 500 TABLET ORAL at 13:44

## 2022-05-10 RX ADMIN — QUETIAPINE FUMARATE 150 MG: 100 TABLET ORAL at 10:57

## 2022-05-10 RX ADMIN — GABAPENTIN 600 MG: 600 TABLET ORAL at 13:44

## 2022-05-10 RX ADMIN — CLONIDINE HYDROCHLORIDE 0.3 MG: 0.1 TABLET ORAL at 13:45

## 2022-05-10 RX ADMIN — OXYCODONE 5 MG: 5 TABLET ORAL at 19:50

## 2022-05-10 RX ADMIN — POLYETHYLENE GLYCOL 3350 17 G: 17 POWDER, FOR SOLUTION ORAL at 07:56

## 2022-05-10 RX ADMIN — ACETAMINOPHEN 1000 MG: 500 TABLET ORAL at 21:19

## 2022-05-10 RX ADMIN — DOCUSATE SODIUM 50 MG AND SENNOSIDES 8.6 MG 1 TABLET: 8.6; 5 TABLET, FILM COATED ORAL at 21:18

## 2022-05-10 RX ADMIN — FENTANYL CITRATE 50 MCG: 50 INJECTION, SOLUTION INTRAMUSCULAR; INTRAVENOUS at 14:20

## 2022-05-10 RX ADMIN — ACETAMINOPHEN 1000 MG: 500 TABLET ORAL at 05:15

## 2022-05-10 RX ADMIN — FAMOTIDINE 20 MG: 20 TABLET, FILM COATED ORAL at 07:58

## 2022-05-10 RX ADMIN — FENTANYL CITRATE 50 MCG: 50 INJECTION, SOLUTION INTRAMUSCULAR; INTRAVENOUS at 20:07

## 2022-05-10 RX ADMIN — ENOXAPARIN SODIUM 30 MG: 100 INJECTION SUBCUTANEOUS at 07:59

## 2022-05-10 RX ADMIN — DIAZEPAM 5 MG: 5 TABLET ORAL at 10:57

## 2022-05-10 RX ADMIN — CLONIDINE HYDROCHLORIDE 0.3 MG: 0.1 TABLET ORAL at 21:18

## 2022-05-10 RX ADMIN — FENTANYL CITRATE 50 MCG: 50 INJECTION, SOLUTION INTRAMUSCULAR; INTRAVENOUS at 12:48

## 2022-05-10 ASSESSMENT — PAIN SCALES - WONG BAKER
WONGBAKER_NUMERICALRESPONSE: 0
WONGBAKER_NUMERICALRESPONSE: 2
WONGBAKER_NUMERICALRESPONSE: 0

## 2022-05-10 ASSESSMENT — PULMONARY FUNCTION TESTS
PIF_VALUE: 17
PIF_VALUE: 16
PIF_VALUE: 17
PIF_VALUE: 16
PIF_VALUE: 16
PIF_VALUE: 17
PIF_VALUE: 16
PIF_VALUE: 17
PIF_VALUE: 23
PIF_VALUE: 17
PIF_VALUE: 16
PIF_VALUE: 17
PIF_VALUE: 15

## 2022-05-10 ASSESSMENT — PAIN SCALES - GENERAL
PAINLEVEL_OUTOF10: 7
PAINLEVEL_OUTOF10: 7

## 2022-05-10 NOTE — PLAN OF CARE
Problem: OXYGENATION/RESPIRATORY FUNCTION  Goal: Patient will maintain patent airway  5/10/2022 0731 by Azalia Gonsalves RN  Outcome: Progressing  5/10/2022 0208 by Supriya Christiansen RCP  Outcome: Progressing  5/10/2022 0157 by Bridgette Mar RN  Outcome: Progressing  Goal: Patient will achieve/maintain normal respiratory rate/effort  Description: Respiratory rate and effort will be within normal limits for the patient  5/10/2022 0731 by Azalia Gonsalves RN  Outcome: Progressing  5/10/2022 0208 by Supriya Christiansen RCP  Outcome: Progressing  5/10/2022 0157 by Bridgette Mar RN  Outcome: Progressing     Problem: SKIN INTEGRITY  Goal: Skin integrity is maintained or improved  5/10/2022 0731 by Azalia Gonsalves RN  Outcome: Progressing  5/10/2022 0157 by Bridgette Mar RN  Outcome: Progressing     Problem: Non-Violent Restraints  Goal: Removal from restraints as soon as assessed to be safe  5/10/2022 0731 by Azalia Gonsalves RN  Outcome: Progressing  5/10/2022 0157 by Bridgette Mar RN  Outcome: Progressing  Goal: No harm/injury to patient while restraints in use  5/10/2022 0731 by Azalia Gonsalves RN  Outcome: Progressing  5/10/2022 0157 by Bridgette Mar RN  Outcome: Progressing  Goal: Patient's dignity will be maintained  5/10/2022 0731 by Azalia Gonsalves RN  Outcome: Progressing  5/10/2022 0157 by Bridgette Mar RN  Outcome: Progressing     Problem: Skin Integrity:  Goal: Will show no infection signs and symptoms  Description: Will show no infection signs and symptoms  5/10/2022 0731 by Azalia Gonsalves RN  Outcome: Progressing  5/10/2022 0157 by Bridgette Mar RN  Outcome: Progressing  Goal: Absence of new skin breakdown  Description: Absence of new skin breakdown  5/10/2022 0731 by Azalia Gonsalves RN  Outcome: Progressing  5/10/2022 0157 by Bridgette Mar RN  Outcome: Progressing     Problem: Falls - Risk of:  Goal: Will remain free from falls  Description: Will remain free from falls  5/10/2022 0731 by Hina Dias RN  Outcome: Progressing  5/10/2022 0157 by Toño Jacques RN  Outcome: Progressing  Goal: Absence of physical injury  Description: Absence of physical injury  5/10/2022 0731 by Hina Dias RN  Outcome: Progressing  5/10/2022 0157 by Toño Jacques RN  Outcome: Progressing     Problem: Nutrition  Goal: Optimal nutrition therapy  5/10/2022 0731 by Hina Dias, RN  Outcome: Progressing  5/10/2022 0157 by Toño Jacques RN  Outcome: Progressing     Problem: MECHANICAL VENTILATION  Goal: Patient will maintain patent airway  5/10/2022 0731 by Hina Dias RN  Outcome: Progressing  5/10/2022 0208 by KELSEA SanfordP  Outcome: Progressing  5/10/2022 0157 by Toño Jacques RN  Outcome: Progressing  Goal: Oral health is maintained or improved  5/10/2022 0731 by Hina Dias RN  Outcome: Progressing  5/10/2022 0208 by Concepción Scott, RCP  Outcome: Progressing  5/10/2022 0157 by Toño Jacques RN  Outcome: Progressing  Goal: Ability to express needs and understand communication  5/10/2022 0731 by Hina Dias RN  Outcome: Progressing  5/10/2022 0208 by Concepción Scott, RCP  Outcome: Progressing  5/10/2022 0157 by Toño Jacques RN  Outcome: Progressing  Goal: Mobility/activity is maintained at optimum level for patient  5/10/2022 0731 by Hina Dias, RN  Outcome: Progressing  5/10/2022 0208 by Concepción Scott RCP  Outcome: Progressing  5/10/2022 0157 by Toño Jacques RN  Outcome: Progressing     Problem: Discharge Planning  Goal: Discharge to home or other facility with appropriate resources  5/10/2022 0731 by Hina Dias RN  Outcome: Progressing  5/10/2022 0157 by Toño Jacques RN  Outcome: Progressing  Flowsheets (Taken 5/9/2022 2000)  Discharge to home or other facility with appropriate resources: Identify barriers to discharge with patient and caregiver     Problem: Safety - Medical Restraint  Goal: Remains free of injury from restraints (Restraint for Interference with Medical Device)  Description: INTERVENTIONS:  1. Determine that other, less restrictive measures have been tried or would not be effective before applying the restraint  2. Evaluate the patient's condition at the time of restraint application  3. Inform patient/family regarding the reason for restraint  4.  Q2H: Monitor safety, psychosocial status, comfort, nutrition and hydration  5/10/2022 0731 by Ayla Dela Cruz RN  Outcome: Progressing  5/10/2022 0157 by Lis White RN  Outcome: Progressing  Flowsheets  Taken 5/9/2022 2000 by Lis White RN  Remains free of injury from restraints (restraint for interference with medical device): Determine that other, less restrictive measures have been tried or would not be effective before applying the restraint  Taken 5/9/2022 1900 by Aretha Rice RN  Remains free of injury from restraints (restraint for interference with medical device): Determine that other, less restrictive measures have been tried or would not be effective before applying the restraint  Taken 5/9/2022 1700 by Aretha Rice RN  Remains free of injury from restraints (restraint for interference with medical device): Determine that other, less restrictive measures have been tried or would not be effective before applying the restraint     Problem: Pain  Goal: Verbalizes/displays adequate comfort level or baseline comfort level  5/10/2022 0731 by Ayla Dela Cruz RN  Outcome: Progressing  5/10/2022 0157 by Lis White RN  Outcome: Progressing  Flowsheets (Taken 5/9/2022 2000)  Verbalizes/displays adequate comfort level or baseline comfort level: Encourage patient to monitor pain and request assistance     Problem: ABCDS Injury Assessment  Goal: Absence of physical injury  5/10/2022 0731 by Ayla Dela Cruz RN  Outcome: Progressing  5/10/2022 0157 by Toño Jacques RN  Outcome: Progressing     Problem: Respiratory - Adult  Goal: Achieves optimal ventilation and oxygenation  Outcome: Progressing

## 2022-05-10 NOTE — CARE COORDINATION
Called Merlene at [facility] to check on precert. Left voicemail asking for a return     Received a voicemail from Mark Gocnalves at [facility], returned call had to leave another voicemail. [phone] received a call form Mark Goncalves at [facility], she tells me that they have received precert but are unable to acept until tomorrow around 3 pm as they have multiple admission tonight and tomorrow morning. Sent for transport with [facility] awaiting time.   Called patient mother and updated her on the plan

## 2022-05-10 NOTE — PLAN OF CARE
Problem: OXYGENATION/RESPIRATORY FUNCTION  Goal: Patient will maintain patent airway  Outcome: Progressing  Goal: Patient will achieve/maintain normal respiratory rate/effort  Description: Respiratory rate and effort will be within normal limits for the patient  Outcome: Progressing     Problem: SKIN INTEGRITY  Goal: Skin integrity is maintained or improved  Outcome: Progressing     Problem: Non-Violent Restraints  Goal: Removal from restraints as soon as assessed to be safe  Outcome: Progressing  Goal: No harm/injury to patient while restraints in use  Outcome: Progressing  Goal: Patient's dignity will be maintained  Outcome: Progressing     Problem: Skin Integrity:  Goal: Will show no infection signs and symptoms  Description: Will show no infection signs and symptoms  Outcome: Progressing  Goal: Absence of new skin breakdown  Description: Absence of new skin breakdown  Outcome: Progressing     Problem: Falls - Risk of:  Goal: Will remain free from falls  Description: Will remain free from falls  Outcome: Progressing  Goal: Absence of physical injury  Description: Absence of physical injury  Outcome: Progressing     Problem: Nutrition  Goal: Optimal nutrition therapy  Outcome: Progressing     Problem: MECHANICAL VENTILATION  Goal: Patient will maintain patent airway  Outcome: Progressing  Goal: Oral health is maintained or improved  Outcome: Progressing  Goal: Ability to express needs and understand communication  Outcome: Progressing  Goal: Mobility/activity is maintained at optimum level for patient  Outcome: Progressing     Problem: Discharge Planning  Goal: Discharge to home or other facility with appropriate resources  Outcome: Progressing  Flowsheets (Taken 5/9/2022 2000)  Discharge to home or other facility with appropriate resources: Identify barriers to discharge with patient and caregiver     Problem: Safety - Medical Restraint  Goal: Remains free of injury from restraints (Restraint for Interference with Medical Device)  Description: INTERVENTIONS:  1. Determine that other, less restrictive measures have been tried or would not be effective before applying the restraint  2. Evaluate the patient's condition at the time of restraint application  3. Inform patient/family regarding the reason for restraint  4.  Q2H: Monitor safety, psychosocial status, comfort, nutrition and hydration  Outcome: Progressing  Flowsheets  Taken 5/9/2022 2000 by Isak Dick RN  Remains free of injury from restraints (restraint for interference with medical device): Determine that other, less restrictive measures have been tried or would not be effective before applying the restraint  Taken 5/9/2022 1900 by Amber Carpio RN  Remains free of injury from restraints (restraint for interference with medical device): Determine that other, less restrictive measures have been tried or would not be effective before applying the restraint  Taken 5/9/2022 1700 by Amber Carpio RN  Remains free of injury from restraints (restraint for interference with medical device): Determine that other, less restrictive measures have been tried or would not be effective before applying the restraint     Problem: Pain  Goal: Verbalizes/displays adequate comfort level or baseline comfort level  Outcome: Progressing  Flowsheets (Taken 5/9/2022 2000)  Verbalizes/displays adequate comfort level or baseline comfort level: Encourage patient to monitor pain and request assistance     Problem: ABCDS Injury Assessment  Goal: Absence of physical injury  Outcome: Progressing

## 2022-05-10 NOTE — PLAN OF CARE
Problem: OXYGENATION/RESPIRATORY FUNCTION  Goal: Patient will maintain patent airway  5/10/2022 0947 by Miguel Villafuerte RCP  Outcome: Progressing  5/10/2022 0731 by Laquita Viera RN  Outcome: Progressing  5/10/2022 0208 by KELSEA HigginsP  Outcome: Progressing  5/10/2022 0157 by Trena Shore RN  Outcome: Progressing  Goal: Patient will achieve/maintain normal respiratory rate/effort  Description: Respiratory rate and effort will be within normal limits for the patient  5/10/2022 0947 by Miguel Villafuerte RCP  Outcome: Progressing  5/10/2022 0731 by Laquita Viera RN  Outcome: Progressing  5/10/2022 0208 by KELSEA HigginsP  Outcome: Progressing  5/10/2022 0157 by Trena Shore RN  Outcome: Progressing     Problem: SKIN INTEGRITY  Goal: Skin integrity is maintained or improved  5/10/2022 0947 by Miguel Villafuerte RCP  Outcome: Progressing  5/10/2022 0731 by Laquita Viera RN  Outcome: Progressing  5/10/2022 0157 by Trena Shore RN  Outcome: Progressing     Problem: MECHANICAL VENTILATION  Goal: Patient will maintain patent airway  5/10/2022 0947 by Miguel Villafuerte RCP  Outcome: Progressing  5/10/2022 0731 by Laquita Viera RN  Outcome: Progressing  5/10/2022 0208 by KELSEA HigginsP  Outcome: Progressing  5/10/2022 0157 by Trena Shore RN  Outcome: Progressing  Goal: Oral health is maintained or improved  5/10/2022 0947 by Miguel Villafuerte RCP  Outcome: Progressing  5/10/2022 0731 by Laquita Viera RN  Outcome: Progressing  5/10/2022 0208 by Sandy Jimenez RCP  Outcome: Progressing  5/10/2022 0157 by Trena Shore RN  Outcome: Progressing  Goal: Ability to express needs and understand communication  5/10/2022 [phone] by Miguel Villafuerte RCP  Outcome: Progressing  5/10/2022 0731 by Laquita Viera RN  Outcome: Progressing  5/10/2022 0208 by KELSEA HigginsP  Outcome: Progressing  5/10/2022 0157 by Trena Shore RN  Outcome: Progressing  Goal: Mobility/activity is maintained at optimum level for patient  5/10/2022 0947 by Vida Torres RCP  Outcome: Progressing  5/10/2022 0731 by Brandie Grant RN  Outcome: Progressing  5/10/2022 0208 by Sulema Robin RCP  Outcome: Progressing  5/10/2022 0157 by Sushila Mckeon RN  Outcome: Progressing
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
## 2022-05-10 NOTE — PROGRESS NOTES
Physical Therapy   DATE: 5/10/2022  NAME: Corazon Bunn  MRN: [id number]   : 1995    Discharge Recommendations: Continue to Assess (pending progress)     Subjective: Opened his eyes once at his nurse's voice and waved at her   Pain: No wincing. No changes in vitals. Patient follows: No Commands  Is patient on ventilator: yes  Is patient on sedation: yes  Precautions: C collar on at all times. Soft wrist restraints. Ventilated via trach. Therapeutic exercises:  PROM B UEs, LEs, all planes x 10 reps   gastrocnemius stretching 1 rep x 30 seconds    Goals  Short Term Goals  Short term goal 1: Perform bed mobility with SBA  Short term goal 2: Perform sit to stand transfer with Min A  Short term goal 3: Ambulate 100ft with appropriate AD and Min A  Short term goal 4: Demo Fair- dynamic standing balance to decrease risk of falls       Plan: Progress functional mobility as medically appropriate.    Time In: 1044  Time Out: 1102  Time Coded Minutes (treatment minutes): 18  Rehab Potential: good  Treatments/week: 2-3x/wk    Juana Knight, PT

## 2022-05-10 NOTE — PLAN OF CARE
Problem: OXYGENATION/RESPIRATORY FUNCTION  Goal: Patient will maintain patent airway  5/10/2022 0208 by Edinson Tirado RCP  Outcome: Progressing     Problem: OXYGENATION/RESPIRATORY FUNCTION  Goal: Patient will achieve/maintain normal respiratory rate/effort  Description: Respiratory rate and effort will be within normal limits for the patient  5/10/2022 0208 by Edinson Tirado RCP  Outcome: Progressing     Problem: MECHANICAL VENTILATION  Goal: Patient will maintain patent airway  5/10/2022 0208 by Edinson Tirado RCP  Outcome: Progressing     Problem: MECHANICAL VENTILATION  Goal: Oral health is maintained or improved  5/10/2022 0208 by Edinson Tirado RCP  Outcome: Progressing     Problem: MECHANICAL VENTILATION  Goal: Ability to express needs and understand communication  5/10/2022 0208 by Edinson Tirado RCP  Outcome: Progressing     Problem: MECHANICAL VENTILATION  Goal: Mobility/activity is maintained at optimum level for patient  5/10/2022 0208 by Edinson Tirado RCP  Outcome: Progressing     Problem: Discharge Planning  Goal: Discharge to home or other facility with appropriate resources  5/10/2022 0157 by William Han RN  Outcome: Progressing  Flowsheets (Taken 5/9/2022 2000)  Discharge to home or other facility with appropriate resources: Identify barriers to discharge with patient and caregiver

## 2022-05-10 NOTE — PROGRESS NOTES
Comprehensive Nutrition Assessment    Type and Reason for Visit:  Reassess    Nutrition Recommendations/Plan:   1. Continue bolus TF of Immune Enhancing formula of 400 mL x 4 per day with propofol being provided = 2400 kcals (+kcals from propofol), 150 gm pro/day. Monitor TF tolerance/adequacy of intakes - modify as needed. 2. Monitor labs, weights, and plan of care. Malnutrition Assessment:  Malnutrition Status: At risk for malnutrition (Comment) (05/04/22 1205)    Context:  Acute Illness     Findings of the 6 clinical characteristics of malnutrition:  Energy Intake:  No significant decrease in energy intake - Meeting estimated needs with TF. Weight Loss:  Unable to assess - Wt fluctuations since admission noted - down 5.5% (? d/t fluids). Body Fat Loss:  No significant body fat loss   Muscle Mass Loss:  No significant muscle mass loss  Fluid Accumulation:  Mild Extremities,Generalized   Strength:  Not Performed    Nutrition Assessment:    Pt continues to tolerate bolus feedings of Immune Enhancing formula of 400 mL x 4 per day. Propofol continues at 15.1-21.1 mL/hr. Last BM 5/8. Weight fluctuations noted. Labs reviewed. Meds include: Folic Acid, Glycolax, Senokot. Nutrition Related Findings:    Labs/Meds reviewed. Last BM 5/8. +Trach and PEG Wound Type: Surgical Incision       Current Nutrition Intake & Therapies:    Average Meal Intake: NPO  Average Supplements Intake: NPO  ADULT TUBE FEEDING; PEG; Immune Enhancing; Bolus; 4 Times Daily; 400; 30; Q 4 hours  Current Tube Feeding (TF) Orders:  · Feeding Route: PEG  · Formula: Immune Enhancing  · Schedule:  Bolus  · Feeding Regimen: 400 mL x 4 per day  · Water Flushes: per MD  · Current TF & Flush Orders Provides: Pivot 1.5 (Immune Enhancing) bolus feedings of 400 mL x 4 per day = 2400 kcals (+kcals from propofol), 150 gm pro, 1200 mL free water per day  · Goal TF & Flush Orders Provides: Pivot 1.5 (Immune Enhancing) bolus feedings of 400 mL x 4 per day = 2400 kcals (+kcals from propofol), 150 gm pro, 1200 mL free water per day    Additional Calorie Sources:  · Propofol at 15.1-21.1 mL/hr = 399-557 kcals/day. Anthropometric Measures:  Height: 5' 11\" (180.3 cm)  Ideal Body Weight (IBW): 172 lbs (78 kg)    Admission Body Weight: 235 lb 3.7 oz (106.7 kg) (4/12, bedscale)  Current Body Weight: 224 lb 11.2 oz (101.9 kg), 130.6 % IBW. Weight Source: Bed Scale  Current BMI (kg/m2): 31.4  Usual Body Weight:  (UTO)     Weight Adjustment For: No Adjustment                 BMI Categories: Obese Class 1 (BMI 30.0-34. 9)    Estimated Daily Nutrient Needs:  Energy Requirements Based On: Formula  Weight Used for Energy Requirements: Current  Energy (kcal/day): 2455-1279 kcals/day  Weight Used for Protein Requirements: Ideal (1.5-2)  Protein (g/day): 120-160 g pro/day  Method Used for Fluid Requirements: Other  Fluid (ml/day): Per MD    Nutrition Diagnosis:   · Inadequate oral intake related to acute injury/trauma,impaired respiratory function as evidenced by NPO or clear liquid status due to medical condition,nutrition support - enteral nutrition    Nutrition Interventions:   Food and/or Nutrient Delivery: Continue Current Tube Feeding  Nutrition Education/Counseling: No recommendation at this time  Coordination of Nutrition Care: Continue to monitor while inpatient,Speech Therapy,Swallow Evaluation       Goals:  Previous Goal Met: Goal(s) Achieved  Goals: Meet at least 75% of estimated needs       Nutrition Monitoring and Evaluation:   Behavioral-Environmental Outcomes: None Identified  Food/Nutrient Intake Outcomes: Enteral Nutrition Intake/Tolerance,Diet Advancement/Tolerance  Physical Signs/Symptoms Outcomes: Biochemical Data,Chewing or Swallowing,GI Status,Fluid Status or Edema,Hemodynamic Status,Nutrition Focused Physical Findings,Skin,Weight    Discharge Planning:    Enteral Nutrition     Lima Lester RD, LD  Contact: [phone]

## 2022-05-11 VITALS
RESPIRATION RATE: 20 BRPM | TEMPERATURE: 99 F | WEIGHT: 224.7 LBS | BODY MASS INDEX: 31.46 KG/M2 | HEART RATE: 103 BPM | OXYGEN SATURATION: 97 % | SYSTOLIC BLOOD PRESSURE: 135 MMHG | DIASTOLIC BLOOD PRESSURE: 104 MMHG | HEIGHT: 71 IN

## 2022-05-11 LAB
ABSOLUTE EOS #: 0.22 K/UL (ref 0–0.44)
ABSOLUTE IMMATURE GRANULOCYTE: 0.04 K/UL (ref 0–0.3)
ABSOLUTE LYMPH #: 1.37 K/UL (ref 1.1–3.7)
ABSOLUTE MONO #: 0.69 K/UL (ref 0.1–1.2)
ANION GAP SERPL CALCULATED.3IONS-SCNC: 10 MMOL/L (ref 9–17)
BASOPHILS # BLD: 0 % (ref 0–2)
BASOPHILS ABSOLUTE: 0.03 K/UL (ref 0–0.2)
BUN BLDV-MCNC: 27 MG/DL (ref 6–20)
CALCIUM SERPL-MCNC: 9 MG/DL (ref 8.6–10.4)
CHLORIDE BLD-SCNC: 101 MMOL/L (ref 98–107)
CO2: 25 MMOL/L (ref 20–31)
CREAT SERPL-MCNC: 0.69 MG/DL (ref 0.7–1.2)
EOSINOPHILS RELATIVE PERCENT: 3 % (ref 1–4)
GFR AFRICAN AMERICAN: >60 ML/MIN
GFR NON-AFRICAN AMERICAN: >60 ML/MIN
GFR SERPL CREATININE-BSD FRML MDRD: ABNORMAL ML/MIN/{1.73_M2}
GLUCOSE BLD-MCNC: 115 MG/DL (ref 70–99)
HCT VFR BLD CALC: 34.2 % (ref 40.7–50.3)
HCT VFR BLD CALC: NORMAL % (ref 40.7–50.3)
HEMOGLOBIN: 11.6 G/DL (ref 13–17)
HEMOGLOBIN: NORMAL G/DL (ref 13–17)
IMMATURE GRANULOCYTES: 1 %
LYMPHOCYTES # BLD: 16 % (ref 24–43)
MCH RBC QN AUTO: 32.6 PG (ref 25.2–33.5)
MCH RBC QN AUTO: NORMAL PG (ref 25.2–33.5)
MCHC RBC AUTO-ENTMCNC: 33.9 G/DL (ref 28.4–34.8)
MCHC RBC AUTO-ENTMCNC: NORMAL G/DL (ref 28.4–34.8)
MCV RBC AUTO: 96.1 FL (ref 82.6–102.9)
MCV RBC AUTO: NORMAL FL (ref 82.6–102.9)
MONOCYTES # BLD: 8 % (ref 3–12)
NRBC AUTOMATED: 0 PER 100 WBC
NRBC AUTOMATED: NORMAL PER 100 WBC
PDW BLD-RTO: 12.6 % (ref 11.8–14.4)
PDW BLD-RTO: NORMAL % (ref 11.8–14.4)
PLATELET # BLD: ABNORMAL K/UL (ref 138–453)
PLATELET # BLD: NORMAL K/UL (ref 138–453)
PLATELET, FLUORESCENCE: 197 K/UL (ref 138–453)
PLATELET, IMMATURE FRACTION: 4 % (ref 1.1–10.3)
PMV BLD AUTO: NORMAL FL (ref 8.1–13.5)
POTASSIUM SERPL-SCNC: 4.8 MMOL/L (ref 3.7–5.3)
RBC # BLD: 3.56 M/UL (ref 4.21–5.77)
RBC # BLD: NORMAL M/UL (ref 4.21–5.77)
REASON FOR REJECTION: NORMAL
SEG NEUTROPHILS: 73 % (ref 36–65)
SEGMENTED NEUTROPHILS ABSOLUTE COUNT: 6.32 K/UL (ref 1.5–8.1)
SODIUM BLD-SCNC: 136 MMOL/L (ref 135–144)
WBC # BLD: 8.7 K/UL (ref 3.5–11.3)
WBC # BLD: NORMAL K/UL (ref 3.5–11.3)
ZZ NTE CLEAN UP: ORDERED TEST: NORMAL
ZZ NTE WITH NAME CLEAN UP: SPECIMEN SOURCE: NORMAL

## 2022-05-11 PROCEDURE — 51798 US URINE CAPACITY MEASURE: CPT

## 2022-05-11 PROCEDURE — 85027 COMPLETE CBC AUTOMATED: CPT

## 2022-05-11 PROCEDURE — 6360000002 HC RX W HCPCS: Performed by: STUDENT IN AN ORGANIZED HEALTH CARE EDUCATION/TRAINING PROGRAM

## 2022-05-11 PROCEDURE — 94003 VENT MGMT INPAT SUBQ DAY: CPT

## 2022-05-11 PROCEDURE — 2580000003 HC RX 258: Performed by: EMERGENCY MEDICINE

## 2022-05-11 PROCEDURE — 6370000000 HC RX 637 (ALT 250 FOR IP): Performed by: STUDENT IN AN ORGANIZED HEALTH CARE EDUCATION/TRAINING PROGRAM

## 2022-05-11 PROCEDURE — 6370000000 HC RX 637 (ALT 250 FOR IP): Performed by: EMERGENCY MEDICINE

## 2022-05-11 PROCEDURE — 51701 INSERT BLADDER CATHETER: CPT

## 2022-05-11 PROCEDURE — APPNB30 APP NON BILLABLE TIME 0-30 MINS: Performed by: REGISTERED NURSE

## 2022-05-11 PROCEDURE — 6360000002 HC RX W HCPCS: Performed by: EMERGENCY MEDICINE

## 2022-05-11 PROCEDURE — 85025 COMPLETE CBC W/AUTO DIFF WBC: CPT

## 2022-05-11 PROCEDURE — 85055 RETICULATED PLATELET ASSAY: CPT

## 2022-05-11 PROCEDURE — 80048 BASIC METABOLIC PNL TOTAL CA: CPT

## 2022-05-11 PROCEDURE — 36415 COLL VENOUS BLD VENIPUNCTURE: CPT

## 2022-05-11 RX ORDER — GABAPENTIN 250 MG/5ML
200 SOLUTION ORAL EVERY 8 HOURS SCHEDULED
Status: DISCONTINUED | OUTPATIENT
Start: 2022-05-11 | End: 2022-05-12 | Stop reason: HOSPADM

## 2022-05-11 RX ORDER — OXYCODONE HYDROCHLORIDE 5 MG/1
5 TABLET ORAL EVERY 12 HOURS PRN
Qty: 2 TABLET | Refills: 0 | Status: SHIPPED | OUTPATIENT
Start: 2022-05-11 | End: 2022-05-12

## 2022-05-11 RX ORDER — GABAPENTIN 250 MG/5ML
SOLUTION ORAL
Qty: 18 ML | Refills: 0 | Status: SHIPPED | OUTPATIENT
Start: 2022-05-11 | End: 2022-05-13

## 2022-05-11 RX ORDER — PROPOFOL 10 MG/ML
5-50 INJECTION, EMULSION INTRAVENOUS CONTINUOUS
Qty: 1 EACH | Refills: 3 | Status: SHIPPED | OUTPATIENT
Start: 2022-05-11

## 2022-05-11 RX ORDER — DIAZEPAM 5 MG/1
5 TABLET ORAL EVERY 8 HOURS PRN
Qty: 6 TABLET | Refills: 0 | Status: SHIPPED | OUTPATIENT
Start: 2022-05-11 | End: 2022-05-13

## 2022-05-11 RX ORDER — CLONIDINE HYDROCHLORIDE 0.2 MG/1
0.2 TABLET ORAL EVERY 12 HOURS
Status: DISCONTINUED | OUTPATIENT
Start: 2022-05-11 | End: 2022-05-12 | Stop reason: HOSPADM

## 2022-05-11 RX ORDER — CLONIDINE HYDROCHLORIDE 0.2 MG/1
TABLET ORAL
Qty: 60 TABLET | Refills: 3 | Status: SHIPPED | OUTPATIENT
Start: 2022-05-11 | End: 2022-05-15

## 2022-05-11 RX ORDER — QUETIAPINE FUMARATE 50 MG/1
150 TABLET, FILM COATED ORAL EVERY 8 HOURS
Qty: 90 TABLET | Refills: 2 | Status: SHIPPED | OUTPATIENT
Start: 2022-05-11 | End: 2022-06-10

## 2022-05-11 RX ORDER — OXYCODONE HYDROCHLORIDE 5 MG/1
5 TABLET ORAL EVERY 12 HOURS PRN
Status: DISCONTINUED | OUTPATIENT
Start: 2022-05-11 | End: 2022-05-12 | Stop reason: HOSPADM

## 2022-05-11 RX ADMIN — FOLIC ACID 1 MG: 1 TABLET ORAL at 09:19

## 2022-05-11 RX ADMIN — ENOXAPARIN SODIUM 30 MG: 100 INJECTION SUBCUTANEOUS at 07:53

## 2022-05-11 RX ADMIN — QUETIAPINE FUMARATE 150 MG: 100 TABLET ORAL at 21:09

## 2022-05-11 RX ADMIN — FENTANYL CITRATE 50 MCG: 50 INJECTION, SOLUTION INTRAMUSCULAR; INTRAVENOUS at 04:12

## 2022-05-11 RX ADMIN — SODIUM CHLORIDE, PRESERVATIVE FREE 10 ML: 5 INJECTION INTRAVENOUS at 20:49

## 2022-05-11 RX ADMIN — GABAPENTIN 200 MG: 250 SUSPENSION ORAL at 20:48

## 2022-05-11 RX ADMIN — OXYCODONE 5 MG: 5 TABLET ORAL at 05:28

## 2022-05-11 RX ADMIN — DOCUSATE SODIUM 50 MG AND SENNOSIDES 8.6 MG 1 TABLET: 8.6; 5 TABLET, FILM COATED ORAL at 07:52

## 2022-05-11 RX ADMIN — GABAPENTIN 200 MG: 250 SUSPENSION ORAL at 12:49

## 2022-05-11 RX ADMIN — POLYETHYLENE GLYCOL 3350 17 G: 17 POWDER, FOR SOLUTION ORAL at 07:52

## 2022-05-11 RX ADMIN — PROPOFOL 30 MCG/KG/MIN: 10 INJECTION, EMULSION INTRAVENOUS at 10:14

## 2022-05-11 RX ADMIN — DIAZEPAM 5 MG: 5 TABLET ORAL at 20:47

## 2022-05-11 RX ADMIN — PROPOFOL 30 MCG/KG/MIN: 10 INJECTION, EMULSION INTRAVENOUS at 02:36

## 2022-05-11 RX ADMIN — QUETIAPINE FUMARATE 150 MG: 100 TABLET ORAL at 09:19

## 2022-05-11 RX ADMIN — FENTANYL CITRATE 50 MCG: 50 INJECTION, SOLUTION INTRAMUSCULAR; INTRAVENOUS at 00:21

## 2022-05-11 RX ADMIN — PROPOFOL 50 MCG/KG/MIN: 10 INJECTION, EMULSION INTRAVENOUS at 16:46

## 2022-05-11 RX ADMIN — CLONIDINE HYDROCHLORIDE 0.2 MG: 0.2 TABLET ORAL at 16:38

## 2022-05-11 RX ADMIN — CLONIDINE HYDROCHLORIDE 0.3 MG: 0.1 TABLET ORAL at 05:22

## 2022-05-11 RX ADMIN — ACETAMINOPHEN 1000 MG: 500 TABLET ORAL at 12:48

## 2022-05-11 RX ADMIN — Medication 10 MG: at 07:52

## 2022-05-11 RX ADMIN — PROPOFOL 30 MCG/KG/MIN: 10 INJECTION, EMULSION INTRAVENOUS at 21:10

## 2022-05-11 RX ADMIN — DIAZEPAM 5 MG: 5 TABLET ORAL at 04:14

## 2022-05-11 RX ADMIN — ACETAMINOPHEN 1000 MG: 500 TABLET ORAL at 05:22

## 2022-05-11 RX ADMIN — ENOXAPARIN SODIUM 30 MG: 100 INJECTION SUBCUTANEOUS at 21:11

## 2022-05-11 RX ADMIN — PROPOFOL 40 MCG/KG/MIN: 10 INJECTION, EMULSION INTRAVENOUS at 13:21

## 2022-05-11 RX ADMIN — FAMOTIDINE 20 MG: 20 TABLET, FILM COATED ORAL at 07:52

## 2022-05-11 RX ADMIN — QUETIAPINE FUMARATE 150 MG: 100 TABLET ORAL at 03:22

## 2022-05-11 RX ADMIN — ACETAMINOPHEN 1000 MG: 500 TABLET ORAL at 20:47

## 2022-05-11 RX ADMIN — GABAPENTIN 300 MG: 600 TABLET ORAL at 04:16

## 2022-05-11 RX ADMIN — Medication 10 MG: at 21:09

## 2022-05-11 RX ADMIN — FAMOTIDINE 20 MG: 20 TABLET, FILM COATED ORAL at 20:47

## 2022-05-11 RX ADMIN — DOCUSATE SODIUM 50 MG AND SENNOSIDES 8.6 MG 1 TABLET: 8.6; 5 TABLET, FILM COATED ORAL at 20:48

## 2022-05-11 ASSESSMENT — PAIN SCALES - WONG BAKER
WONGBAKER_NUMERICALRESPONSE: 0

## 2022-05-11 ASSESSMENT — PAIN SCALES - GENERAL
PAINLEVEL_OUTOF10: 7
PAINLEVEL_OUTOF10: 0
PAINLEVEL_OUTOF10: 7

## 2022-05-11 ASSESSMENT — PULMONARY FUNCTION TESTS
PIF_VALUE: 30
PIF_VALUE: 15
PIF_VALUE: 13
PIF_VALUE: 13
PIF_VALUE: 15
PIF_VALUE: 9

## 2022-05-11 NOTE — PLAN OF CARE
Problem: OXYGENATION/RESPIRATORY FUNCTION  Goal: Patient will maintain patent airway  5/11/2022 0723 by Cee Major RN  Outcome: Progressing  5/11/2022 0632 by Kassidy Woods RCP  Outcome: Progressing  5/10/2022 2042 by Andreia Scott RN  Outcome: Progressing  Goal: Patient will achieve/maintain normal respiratory rate/effort  Description: Respiratory rate and effort will be within normal limits for the patient  5/11/2022 0723 by Cee Major RN  Outcome: Progressing  5/11/2022 0632 by Kassidy Woods RCP  Outcome: Progressing  5/10/2022 2042 by Andreia Scott RN  Outcome: Progressing     Problem: SKIN INTEGRITY  Goal: Skin integrity is maintained or improved  5/11/2022 0723 by Cee Major RN  Outcome: Progressing  5/10/2022 2042 by Andreia Scott RN  Outcome: Progressing     Problem: Non-Violent Restraints  Goal: Removal from restraints as soon as assessed to be safe  5/11/2022 0723 by Cee Major RN  Outcome: Progressing  5/10/2022 2042 by Andreia Scott RN  Outcome: Progressing  Goal: No harm/injury to patient while restraints in use  5/11/2022 0723 by Cee Major RN  Outcome: Progressing  5/10/2022 2042 by Andreia Scott RN  Outcome: Progressing  Goal: Patient's dignity will be maintained  5/11/2022 0723 by Cee Major RN  Outcome: Progressing  5/10/2022 2042 by Andreia Scott RN  Outcome: Progressing     Problem: Skin Integrity:  Goal: Will show no infection signs and symptoms  Description: Will show no infection signs and symptoms  5/11/2022 0723 by Cee Major RN  Outcome: Progressing  5/10/2022 2042 by Andreia Scott RN  Outcome: Progressing  Goal: Absence of new skin breakdown  Description: Absence of new skin breakdown  5/11/2022 0723 by Cee Major RN  Outcome: Progressing  5/10/2022 2042 by Andreia Sctot RN  Outcome: Progressing     Problem: Falls - Risk of:  Goal: Will remain free from falls  Description: Will remain free from falls  5/11/2022 0723 by Vlad Tamez RN  Outcome: Progressing  5/10/2022 2042 by Rachelle Andino RN  Outcome: Progressing  Goal: Absence of physical injury  Description: Absence of physical injury  5/11/2022 0723 by Vlad Tamez RN  Outcome: Progressing  5/10/2022 2042 by Rachelle Andino RN  Outcome: Progressing     Problem: Nutrition  Goal: Optimal nutrition therapy  5/11/2022 0723 by Vlad Tamez RN  Outcome: Progressing  5/10/2022 2042 by Rachelle Andino RN  Outcome: Progressing     Problem: MECHANICAL VENTILATION  Goal: Patient will maintain patent airway  5/11/2022 0723 by Vlad Tamez RN  Outcome: Progressing  5/11/2022 0632 by Jhoana Flores RCP  Outcome: Progressing  5/10/2022 2042 by Rachelle Andino RN  Outcome: Progressing  Goal: Oral health is maintained or improved  5/11/2022 0723 by Vlad Tamez RN  Outcome: Progressing  5/11/2022 0632 by Jhoana Flores RCP  Outcome: Progressing  5/10/2022 2042 by Rachelle Andino RN  Outcome: Progressing  Goal: Ability to express needs and understand communication  5/11/2022 0723 by Vlad Tamez RN  Outcome: Progressing  5/11/2022 0632 by Jhoana Flores RCP  Outcome: Progressing  5/10/2022 2042 by Rachelle Andino RN  Outcome: Progressing  Goal: Mobility/activity is maintained at optimum level for patient  5/11/2022 0723 by Vlad Tamez RN  Outcome: Progressing  5/11/2022 0632 by Jhoana Flores RCP  Outcome: Progressing  5/10/2022 2042 by Rachelle Andino RN  Outcome: Progressing     Problem: Discharge Planning  Goal: Discharge to home or other facility with appropriate resources  5/11/2022 0723 by Vlad Tamez RN  Outcome: Progressing  5/10/2022 2042 by Rachelle Andino RN  Outcome: Progressing     Problem: Safety - Medical Restraint  Goal: Remains free of injury from restraints (Restraint for Interference with Medical Device)  Description: INTERVENTIONS:  1. Determine that other, less restrictive measures have been tried or would not be effective before applying the restraint  2. Evaluate the patient's condition at the time of restraint application  3. Inform patient/family regarding the reason for restraint  4.  Q2H: Monitor safety, psychosocial status, comfort, nutrition and hydration  5/11/2022 0723 by Wilver Luna RN  Outcome: Progressing  Flowsheets  Taken 5/11/2022 0602 by Georgia Ferrer RN  Remains free of injury from restraints (restraint for interference with medical device):   Determine that other, less restrictive measures have been tried or would not be effective before applying the restraint   Evaluate the patient's condition at the time of restraint application   Inform patient/family regarding the reason for restraint  Taken 5/11/2022 0400 by Georgia Ferrer RN  Remains free of injury from restraints (restraint for interference with medical device):   Determine that other, less restrictive measures have been tried or would not be effective before applying the restraint   Evaluate the patient's condition at the time of restraint application   Inform patient/family regarding the reason for restraint   Every 2 hours: Monitor safety, psychosocial status, comfort, nutrition and hydration  Taken 5/11/2022 0200 by Georgia Ferrer RN  Remains free of injury from restraints (restraint for interference with medical device):   Determine that other, less restrictive measures have been tried or would not be effective before applying the restraint   Inform patient/family regarding the reason for restraint   Evaluate the patient's condition at the time of restraint application   Every 2 hours: Monitor safety, psychosocial status, comfort, nutrition and hydration  Taken 5/11/2022 0000 by Georgia Ferrer RN  Remains free of injury from restraints (restraint for interference with medical device):   Determine that other, less restrictive measures have been tried or would not be effective before applying the restraint   Evaluate the patient's condition at the time of restraint application   Inform patient/family regarding the reason for restraint   Every 2 hours: Monitor safety, psychosocial status, comfort, nutrition and hydration  Taken 5/10/2022 2200 by Leonides Underwood RN  Remains free of injury from restraints (restraint for interference with medical device):   Determine that other, less restrictive measures have been tried or would not be effective before applying the restraint   Evaluate the patient's condition at the time of restraint application   Inform patient/family regarding the reason for restraint   Every 2 hours: Monitor safety, psychosocial status, comfort, nutrition and hydration  5/10/2022 2042 by Leonides Underwood RN  Outcome: Progressing  Flowsheets  Taken 5/10/2022 2000 by Leonides Underwood RN  Remains free of injury from restraints (restraint for interference with medical device):   Determine that other, less restrictive measures have been tried or would not be effective before applying the restraint   Evaluate the patient's condition at the time of restraint application   Inform patient/family regarding the reason for restraint   Every 2 hours: Monitor safety, psychosocial status, comfort, nutrition and hydration  Taken 5/10/2022 1800 by Zaida Cole RN  Remains free of injury from restraints (restraint for interference with medical device):   Determine that other, less restrictive measures have been tried or would not be effective before applying the restraint   Evaluate the patient's condition at the time of restraint application   Inform patient/family regarding the reason for restraint   Every 2 hours: Monitor safety, psychosocial status, comfort, nutrition and hydration  Taken 5/10/2022 1600 by Zaida Cole RN  Remains free of injury from restraints (restraint for interference with medical device):   Determine that other, less restrictive measures have been tried or would not be effective before applying the restraint   Evaluate the patient's condition at the time of restraint application   Inform patient/family regarding the reason for restraint   Every 2 hours: Monitor safety, psychosocial status, comfort, nutrition and hydration  Taken 5/10/2022 1000 by Jordyn Shultz RN  Remains free of injury from restraints (restraint for interference with medical device):   Evaluate the patient's condition at the time of restraint application   Determine that other, less restrictive measures have been tried or would not be effective before applying the restraint   Inform patient/family regarding the reason for restraint   Every 2 hours: Monitor safety, psychosocial status, comfort, nutrition and hydration  Taken 5/10/2022 0800 by Jordyn Shultz RN  Remains free of injury from restraints (restraint for interference with medical device):   Determine that other, less restrictive measures have been tried or would not be effective before applying the restraint   Evaluate the patient's condition at the time of restraint application   Inform patient/family regarding the reason for restraint   Every 2 hours: Monitor safety, psychosocial status, comfort, nutrition and hydration     Problem: Pain  Goal: Verbalizes/displays adequate comfort level or baseline comfort level  5/11/2022 0723 by Jordyn Shultz RN  Outcome: Progressing  Flowsheets (Taken 5/11/2022 0000 by Hayden tSanford RN)  Verbalizes/displays adequate comfort level or baseline comfort level:   Encourage patient to monitor pain and request assistance   Assess pain using appropriate pain scale   Administer analgesics based on type and severity of pain and evaluate response   Implement non-pharmacological measures as appropriate and evaluate response   Consider cultural and social influences on pain and pain management   Notify Licensed Independent Practitioner if interventions unsuccessful or patient reports new pain  5/10/2022 2042 by Lui Wong RN  Outcome: Progressing  Flowsheets  Taken 5/10/2022 1600 by Wil Palacios RN  Verbalizes/displays adequate comfort level or baseline comfort level:   Encourage patient to monitor pain and request assistance   Assess pain using appropriate pain scale   Administer analgesics based on type and severity of pain and evaluate response   Implement non-pharmacological measures as appropriate and evaluate response   Notify Licensed Independent Practitioner if interventions unsuccessful or patient reports new pain  Taken 5/10/2022 0800 by Wil Palacios RN  Verbalizes/displays adequate comfort level or baseline comfort level:   Encourage patient to monitor pain and request assistance   Assess pain using appropriate pain scale   Administer analgesics based on type and severity of pain and evaluate response   Implement non-pharmacological measures as appropriate and evaluate response   Notify Licensed Independent Practitioner if interventions unsuccessful or patient reports new pain     Problem: ABCDS Injury Assessment  Goal: Absence of physical injury  5/11/2022 0723 by Wil Palacios RN  Outcome: Progressing  5/10/2022 2042 by Lui Wogn RN  Outcome: Progressing     Problem: Respiratory - Adult  Goal: Achieves optimal ventilation and oxygenation  5/11/2022 0723 by Wil Palacios RN  Outcome: Progressing  5/10/2022 2042 by Lui Wong RN  Outcome: Progressing

## 2022-05-11 NOTE — PLAN OF CARE
Problem: OXYGENATION/RESPIRATORY FUNCTION  Goal: Patient will maintain patent airway  5/11/2022 3523 by Evelyn Rosado RCP  Outcome: Progressing     Problem: OXYGENATION/RESPIRATORY FUNCTION  Goal: Patient will achieve/maintain normal respiratory rate/effort  Description: Respiratory rate and effort will be within normal limits for the patient  5/11/2022 3161 by Evelyn Rosado RCP  Outcome: Progressing     Problem: MECHANICAL VENTILATION  Goal: Patient will maintain patent airway  5/11/2022 8824 by Evelyn Rosado RCP  Outcome: Progressing     Problem: MECHANICAL VENTILATION  Goal: Oral health is maintained or improved  5/11/2022 9312 by Evelyn Rosado RCP  Outcome: Progressing     Problem: MECHANICAL VENTILATION  Goal: Ability to express needs and understand communication  5/11/2022 0972 by Evelyn Rosado RCP  Outcome: Progressing     Problem: MECHANICAL VENTILATION  Goal: Mobility/activity is maintained at optimum level for patient  5/11/2022 3722 by Evelyn Rosado RCP  Outcome: Progressing

## 2022-05-11 NOTE — CARE COORDINATION
Received a call from Fernando at [facility], she is asking if the RT can please call the facility as the have questions regarding trach and resp.status. Provider her with the RT's number.
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
## 2022-05-11 NOTE — PLAN OF CARE
Problem: OXYGENATION/RESPIRATORY FUNCTION  Goal: Patient will maintain patent airway  5/10/2022 2042 by Giuliano Swain RN  Outcome: Progressing  5/10/2022 [address] by Jayce Colon RCP  Outcome: Progressing  5/10/2022 0731 by Ravi Person RN  Outcome: Progressing  Goal: Patient will achieve/maintain normal respiratory rate/effort  Description: Respiratory rate and effort will be within normal limits for the patient  5/10/2022 2042 by Giuliano Swain RN  Outcome: Progressing  5/10/2022 0947 by Jayce Colon RCP  Outcome: Progressing  5/10/2022 0731 by Ravi Person RN  Outcome: Progressing     Problem: SKIN INTEGRITY  Goal: Skin integrity is maintained or improved  5/10/2022 2042 by Giuliano Swain RN  Outcome: Progressing  5/10/2022 [address] by Jayce Colon RCP  Outcome: Progressing  5/10/2022 0731 by Ravi Person RN  Outcome: Progressing     Problem: Non-Violent Restraints  Goal: Removal from restraints as soon as assessed to be safe  5/10/2022 2042 by Giuliano Swain RN  Outcome: Progressing  5/10/2022 0731 by Ravi Person RN  Outcome: Progressing  Goal: No harm/injury to patient while restraints in use  5/10/2022 2042 by Giuliano Swain RN  Outcome: Progressing  5/10/2022 0731 by Ravi Person RN  Outcome: Progressing  Goal: Patient's dignity will be maintained  5/10/2022 2042 by Giuliano Swain RN  Outcome: Progressing  5/10/2022 0731 by Ravi Person RN  Outcome: Progressing     Problem: Skin Integrity:  Goal: Will show no infection signs and symptoms  Description: Will show no infection signs and symptoms  5/10/2022 2042 by Giuliano Swain RN  Outcome: Progressing  5/10/2022 0731 by Ravi Person RN  Outcome: Progressing  Goal: Absence of new skin breakdown  Description: Absence of new skin breakdown  5/10/2022 2042 by Giuliano Swain RN  Outcome: Progressing  5/10/2022 0731 by Ravi Person RN  Outcome: Progressing     Problem: Falls - Risk of:  Goal: Will remain free from falls  Description: Will remain free from falls  5/10/2022 2042 by Marcela Juan RN  Outcome: Progressing  5/10/2022 0731 by Leilani Murillo RN  Outcome: Progressing  Goal: Absence of physical injury  Description: Absence of physical injury  5/10/2022 2042 by Marcela Juan RN  Outcome: Progressing  5/10/2022 0731 by Leilani Murillo RN  Outcome: Progressing     Problem: Nutrition  Goal: Optimal nutrition therapy  5/10/2022 2042 by Marcela Juan RN  Outcome: Progressing  5/10/2022 0731 by Leilani Murillo RN  Outcome: Progressing     Problem: MECHANICAL VENTILATION  Goal: Patient will maintain patent airway  5/10/2022 2042 by Marcela Juan RN  Outcome: Progressing  5/10/2022 [address] by Romulo Emmanuel RCP  Outcome: Progressing  5/10/2022 0731 by Leilani Murillo RN  Outcome: Progressing  Goal: Oral health is maintained or improved  5/10/2022 2042 by Marcela Juan RN  Outcome: Progressing  5/10/2022 [address] by Romulo Emmanuel RCP  Outcome: Progressing  5/10/2022 0731 by Leilani Murillo RN  Outcome: Progressing  Goal: Ability to express needs and understand communication  5/10/2022 2042 by Marcela Juan RN  Outcome: Progressing  5/10/2022 [address] by Romulo Emmanuel RCP  Outcome: Progressing  5/10/2022 0731 by Leilani Murillo RN  Outcome: Progressing  Goal: Mobility/activity is maintained at optimum level for patient  5/10/2022 2042 by Marcela Juan RN  Outcome: Progressing  5/10/2022 [address] by Romulo Emmanuel RCP  Outcome: Progressing  5/10/2022 0731 by Leilani Murillo RN  Outcome: Progressing     Problem: Discharge Planning  Goal: Discharge to home or other facility with appropriate resources  5/10/2022 2042 by Marcela Juan RN  Outcome: Progressing  5/10/2022 0731 by Leilani Murillo RN  Outcome: Progressing     Problem: Safety - Medical Restraint  Goal: Remains free of injury from restraints (Restraint for Interference with Medical Device)  Description: INTERVENTIONS:  1. Determine that other, less restrictive measures have been tried or would not be effective before applying the restraint  2. Evaluate the patient's condition at the time of restraint application  3. Inform patient/family regarding the reason for restraint  4.  Q2H: Monitor safety, psychosocial status, comfort, nutrition and hydration  5/10/2022 2042 by Lui Wong RN  Outcome: Progressing  Flowsheets  Taken 5/10/2022 2000 by Lui Wong RN  Remains free of injury from restraints (restraint for interference with medical device):   Determine that other, less restrictive measures have been tried or would not be effective before applying the restraint   Evaluate the patient's condition at the time of restraint application   Inform patient/family regarding the reason for restraint   Every 2 hours: Monitor safety, psychosocial status, comfort, nutrition and hydration  Taken 5/10/2022 1800 by Wil Palacios RN  Remains free of injury from restraints (restraint for interference with medical device):   Determine that other, less restrictive measures have been tried or would not be effective before applying the restraint   Evaluate the patient's condition at the time of restraint application   Inform patient/family regarding the reason for restraint   Every 2 hours: Monitor safety, psychosocial status, comfort, nutrition and hydration  Taken 5/10/2022 1600 by Wil Palacios RN  Remains free of injury from restraints (restraint for interference with medical device):   Determine that other, less restrictive measures have been tried or would not be effective before applying the restraint   Evaluate the patient's condition at the time of restraint application   Inform patient/family regarding the reason for restraint   Every 2 hours: Monitor safety, psychosocial status, comfort, nutrition and hydration  Taken 5/10/2022 1000 by Wil Palacios RN  Remains free of injury from restraints (restraint for interference with medical device):   Evaluate the patient's condition at the time of restraint application   Determine that other, less restrictive measures have been tried or would not be effective before applying the restraint   Inform patient/family regarding the reason for restraint   Every 2 hours: Monitor safety, psychosocial status, comfort, nutrition and hydration  Taken 5/10/2022 0800 by Parag Gold RN  Remains free of injury from restraints (restraint for interference with medical device):   Determine that other, less restrictive measures have been tried or would not be effective before applying the restraint   Evaluate the patient's condition at the time of restraint application   Inform patient/family regarding the reason for restraint   Every 2 hours: Monitor safety, psychosocial status, comfort, nutrition and hydration  5/10/2022 0731 by Parag Gold RN  Outcome: Progressing  Flowsheets (Taken 5/10/2022 0723)  Remains free of injury from restraints (restraint for interference with medical device):   Determine that other, less restrictive measures have been tried or would not be effective before applying the restraint   Evaluate the patient's condition at the time of restraint application   Inform patient/family regarding the reason for restraint   Every 2 hours: Monitor safety, psychosocial status, comfort, nutrition and hydration     Problem: Pain  Goal: Verbalizes/displays adequate comfort level or baseline comfort level  5/10/2022 2042 by Ray Batres RN  Outcome: Progressing  Flowsheets  Taken 5/10/2022 1600 by Parag Gold RN  Verbalizes/displays adequate comfort level or baseline comfort level:   Encourage patient to monitor pain and request assistance   Assess pain using appropriate pain scale   Administer analgesics based on type and severity of pain and evaluate response   Implement non-pharmacological measures as appropriate and evaluate response   Notify Licensed Independent Practitioner if interventions unsuccessful or patient reports new pain  Taken 5/10/2022 0800 by Leilani Murillo RN  Verbalizes/displays adequate comfort level or baseline comfort level:   Encourage patient to monitor pain and request assistance   Assess pain using appropriate pain scale   Administer analgesics based on type and severity of pain and evaluate response   Implement non-pharmacological measures as appropriate and evaluate response   Notify Licensed Independent Practitioner if interventions unsuccessful or patient reports new pain  5/10/2022 0731 by Leilani Murillo RN  Outcome: Progressing     Problem: ABCDS Injury Assessment  Goal: Absence of physical injury  5/10/2022 2042 by Marcela Juan RN  Outcome: Progressing  5/10/2022 0731 by Leilani Murillo RN  Outcome: Progressing     Problem: Respiratory - Adult  Goal: Achieves optimal ventilation and oxygenation  5/10/2022 2042 by Marcela Juan RN  Outcome: Progressing  5/10/2022 0731 by Leilani Murillo RN  Outcome: Progressing
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
## 2022-05-11 NOTE — CARE COORDINATION
Notified [place] Stallion with trauma that I am still waiting for discharge. Transport set for [place] at [facility]. I informed her of the  time at 3pm.  She states she will call in a few minutes with a room number and number for report. Awaiting call. [id number] Received a call from Rodger Smith at [facility], she informs me that the facility has had multiple call offs today and does not know if they will be able to take patient today. Called [facility] spoke to Js James  to see if they can push back transport until 7. He tells me that that is shift change and he could do an 8:30. I agreed. Carmela Lindsay she states she will call once she is informed if they have staff. [id number] received a call from Rodger Smith and she informs me that they are able to accept patient tonight.   Transport is set for 8:30  time
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
## 2022-05-12 NOTE — PROGRESS NOTES
Pt transferred off the unit to [facility] at 2145 by Semaj.  Report called to [facility]
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
## 2022-06-20 ENCOUNTER — TELEPHONE (OUTPATIENT)
Dept: GASTROENTEROLOGY | Age: 27
End: 2022-06-20

## 2022-07-07 NOTE — TELEPHONE ENCOUNTER
LM with female for patient to call the office. Third attempt and sending back to the referring provider.

## 2022-10-11 NOTE — CARE COORDINATION
HPI:  Kerrie Garland is a 27 y.o. male seen Established   Chief Complaint   Patient presents with    Follow-up     Patient presents today for sinus recheck , he notes no improvement. 47-year-old male seen as a follow-up evaluation formally seen here approximately 6 weeks ago by our NP for similar complaints of chronic sinus pain and pressure and nasal airway obstruction. He has had these off and on and now more consistently for the last year or so. Describes symptoms of significant forehead and midface pain and pressure and almost daily cycles of increased nasal drainage postnasal drip and rhinorrhea. He has significant amount of nasal obstruction and difficulty clearing his nose with pain to the forehead midface and behind his eyes. He denies any obvious fevers but he does have a significant amount of increased nasal airway obstruction/congestion during these events. They will cycle almost every week or so. He has had only one known antibiotic in last year for this complaint. For the last 6 weeks he has had daily consistent use of Flonase, Astelin, Claritin without any benefit or relief of symptoms. Past Medical History, Past Surgical History, Family history, Social History, and Medications were all reviewed with the patient today and updated as necessary. No Known Allergies    There is no problem list on file for this patient.       Current Outpatient Medications   Medication Sig    amoxicillin-clavulanate (AUGMENTIN) 875-125 MG per tablet Take 1 tablet by mouth 2 times daily for 14 days    predniSONE (DELTASONE) 20 MG tablet 60 mg (3 tabs) PO once daily for 3 days; 40 mg (2 tabs) for 2 days; 20 mg (1 tab) for 2 days; 10 mg (1/2 tab) for 2 days    VYVANSE 50 MG capsule     lisinopril (PRINIVIL;ZESTRIL) 10 MG tablet TAKE ONE TABLET BY MOUTH ONE TIME DAILY    FLUoxetine (PROZAC) 20 MG capsule TAKE ONE CAPSULE BY MOUTH ONE TIME DAILY FOR 90 DAYS    citalopram (CELEXA) 20 MG tablet citalopram 20 TRansitional Planning    Spoke to patient's RN. Patient is still on propofol. He is in wrist restraints. He is on vent per trach. Called Fernando [phone] at [facility] and she relates that pre cert is still pending. She will call if she gets authorization. mg tablet    Azelastine HCl 137 MCG/SPRAY SOLN     albuterol sulfate HFA (PROVENTIL;VENTOLIN;PROAIR) 108 (90 Base) MCG/ACT inhaler     traZODone (DESYREL) 50 MG tablet TAKE ONE TO THREE TABLETS BY MOUTH AT BEDTIME AS NEEDED     No current facility-administered medications for this visit. History reviewed. No pertinent past medical history. History reviewed. No pertinent surgical history. Social History     Tobacco Use    Smoking status: Never    Smokeless tobacco: Never   Substance Use Topics    Alcohol use: Not Currently     Comment: rare use       History reviewed. No pertinent family history. ROS:    Review of Systems   Constitutional:  Negative for fever. HENT:  Negative for ear discharge and ear pain. Eyes:  Negative for discharge. Respiratory:  Negative for cough. Cardiovascular:  Negative for chest pain. Gastrointestinal:  Negative for abdominal pain. Endocrine: Negative for cold intolerance. Genitourinary:  Negative for difficulty urinating. Musculoskeletal:  Negative for neck pain. Skin:  Negative for rash. Allergic/Immunologic: Negative for environmental allergies. Neurological:  Negative for dizziness. Hematological:  Negative for adenopathy. Psychiatric/Behavioral:  Negative for agitation. PHYSICAL EXAM:    /82   Ht 6' (1.829 m)   Wt 240 lb (108.9 kg)   BMI 32.55 kg/m²     Physical Exam       PROCEDURE: Nasal endoscopy  INDICATIONS: Nasal obstruction, sinus pain  DESCRIPTION: After verbal consent was obtained and a timeout was performed, the flexible fiberoptic nasal endoscope was advanced into both nares. The septum was significantly deviated and S-shaped curvature to the left inferiorly as well as left-sided bone spur towards the middle meatus and to the right posteriorly with subsequent narrowing of the bilateral middle meatus w/ no perforations. There were no masses seen along the nasal floor or within the nasopharynx.   Outside of narrowing of the middle meatus due to septal deformity there was no evidence of mucopurulence/mucostasis. There was a noted large right-sided middle turbinate suspicion for annemarie bullosa. On the R side, the mucosa was healthy and the turbinates were intact. The middle meatus was clear w/ no edema, polyps or mucopurulence and the sphenoethmoid recess was was clear. On the L side, the mucosa was healthy and the turbinates were intact. The middle meatus was clear w/ no edema, polyps or mucopurulence and the sphenoethmoid recess was was clear. The scope was then removed and the patient tolerated the procedure well w/ no complications. ASSESSMENT and PLAN        ICD-10-CM    1. Nasal obstruction  J34.89 NASAL ENDOSCOPY,DX     CT MAXILLOFACIAL WO CONTRAST      2. Sinus pain  J34.89 NASAL ENDOSCOPY,DX     CT MAXILLOFACIAL WO CONTRAST      3. Deviated nasal septum  J34.2       4. Nasal turbinate hypertrophy  J34.3           Significant DNS bilaterally with bone spur to the left and deflection to the right with associated bilateral middle meatus narrowing. Large right-sided middle turbinate also noted somewhat suspicious for annemarie bullosa. Still with ongoing daily symptoms with worsening cycles every week of nasal congestion sinus pain and pressure and significant nasal airway obstruction with difficulty clearing his nose. This has been ongoing despite daily consistent maximal medical therapy of allergy and sinus medications. For this I have recommended 1 more additional oral antibiotic Augmentin twice daily for 2 weeks and a prednisone taper. We will follow this with a CT of the paranasal sinuses to further evaluate for cause of his sinus pain and associated nasal symptoms. I will call him to review results of imaging and make further management recommendations. At minimum he would benefit from consideration of septoplasty and turbinate reduction.   Further sinus recommendations will be made depending on scan results. Patient has failed maximal medical therapy with nasal saline irrigations, nasal steroid sprays and antihistamines. I recommend proceeding w/ CT sinus w/o contrast to better evaluate for underlying paranasal sinus pathology. Return to clinic after the scan to review the results.        Juliano Alonso DO  10/11/2022

## 2024-11-25 NOTE — PLAN OF CARE
It was a pleasure to see you in clinic today.   Here is what we discussed:    Start prednisone taper + z-pack.  Continue Trelegy Ellipta one puff daily, rinse/gargle after use.  Continue Duonebs every 4-6 hours as needed for shortness of breath or wheezing.  Have the blood work done.  Continue to work on smoking cessation, you can do it!  Call my nurse, Jamaal ([phone]) with any change or worsening of your breathing.  Follow-up in 3 months.     Leanna Muhammad, CNP  Pulmonary Medicine  [facility]  [phone]    Problem: SKIN INTEGRITY  Goal: Skin integrity is maintained or improved  4/30/2022 0408 by Mekhi Juares RN  Outcome: Progressing     Problem: Non-Violent Restraints  Goal: Removal from restraints as soon as assessed to be safe  4/30/2022 0408 by Mekhi Juares RN  Outcome: Not Progressing     Problem: Non-Violent Restraints  Goal: No harm/injury to patient while restraints in use  4/30/2022 0408 by Mekhi Juares RN  Outcome: Progressing     Problem: Non-Violent Restraints  Goal: Patient's dignity will be maintained  4/30/2022 0408 by Mekhi Juares RN  Outcome: Progressing     Problem: Skin Integrity:  Goal: Will show no infection signs and symptoms  Description: Will show no infection signs and symptoms  4/30/2022 0408 by Mekhi Juares RN  Outcome: Progressing     Problem: Falls - Risk of:  Goal: Will remain free from falls  Description: Will remain free from falls  4/30/2022 0408 by Mekhi Juares RN  Outcome: Progressing     Problem: Nutrition  Goal: Optimal nutrition therapy  4/30/2022 0408 by Mekhi Juares RN  Outcome: Progressing

## 2025-03-16 NOTE — TELEPHONE ENCOUNTER
CM informed by Doris SMITH that the patient does not have a ride or oxygen.    9:53 - CM placed a call [employer] to see if a tank can be delivered.     Met with patient and Mitra SMITH at bedside to discuss plan. The patient states she does not have a ride. Has 4 contacts listed in chart. Patient has a roommate but the roommate is not available today.    CM explained to patient that a message has been left for [employer] - [facility] that supplies her oxygen. [employer] does not leave tanks at [place]. Waiting on a call back. Encouraged patient to see if she can get a ride. The patient lives in [facility].    Spoke with Garrett/Swetha. If patient is up and walking around does not really qualify for transport. Oxygen and COPD could get her transport but insurance typically advises the patient can go by car.     Doris advised CM that the patient has a friend that also has a tank and will come and get her.     [employer] showed up with the tank. Patient is now saying she does not have a ride. Dorsi SMITH will secure a cab voucher for the patient.   
Spoke with Jay Jay Ramírez from [employer]. Prescription for 30 day supply of Percocet was unable to be dispensed due to refill for Percocet sent to [place] [address] on 10/08/21. The Prescription on 10/08/21 was a 30 day supply but was suppose to be 7 days. In order for the patient to receive the medication, a 7 day supply was dispensed from [facility].
with the Discharge Plan?      Stacey Correia  Case Management Department  Ph: [phone] Fax: [phone]

## (undated) DEVICE — TUBE TRACH AD SZ 8 L79MM OD122MM ID85MM DK BLU PVC CUF CANN

## (undated) DEVICE — CATHETER,URETHRAL,REDRUBBER,STRL,14FR: Brand: MEDLINE INDUSTRIES, INC.

## (undated) DEVICE — INTENDED FOR TISSUE SEPARATION, AND OTHER PROCEDURES THAT REQUIRE A SHARP SURGICAL BLADE TO PUNCTURE OR CUT.: Brand: BARD-PARKER ® CARBON RIB-BACK BLADES

## (undated) DEVICE — SOLUTION SCRB 4OZ 4% CHG H2O AIDED FOR PREOPERATIVE SKIN

## (undated) DEVICE — GLOVE ORANGE PI 8   MSG9080

## (undated) DEVICE — PATIENT RETURN ELECTRODE, SINGLE-USE, CONTACT QUALITY MONITORING, ADULT, WITH 9FT CORD, FOR PATIENTS WEIGING OVER 33LBS. (15KG): Brand: MEGADYNE

## (undated) DEVICE — APPLICATOR MEDICATED 26 CC SOLUTION HI LT ORNG CHLORAPREP

## (undated) DEVICE — GLOVE ORANGE PI 8 1/2   MSG9085

## (undated) DEVICE — BLADE CLIPPER GEN PURP NS

## (undated) DEVICE — SUTURE NONABSORBABLE MONOFILAMENT 4-0 PS-2 18 IN BLK ETHILON 1667G

## (undated) DEVICE — PADDING,UNDERCAST,COTTON, 4"X4YD STERILE: Brand: MEDLINE

## (undated) DEVICE — GAUZE,SPONGE,FLUFF,6"X6.75",STRL,5/TRAY: Brand: MEDLINE

## (undated) DEVICE — Z DISCONTINUED BY MEDLINE USE 2711682 TRAY SKIN PREP DRY W/ PREM GLV

## (undated) DEVICE — GLOVE SURG SZ 6 THK91MIL LTX FREE SYN POLYISOPRENE ANTI

## (undated) DEVICE — GLOVE ORANGE PI 7   MSG9070

## (undated) DEVICE — ZIMMER® STERILE DISPOSABLE TOURNIQUET CUFF WITH PROTECTIVE SLEEVE AND PLC, DUAL PORT, SINGLE BLADDER, 24 IN. (61 CM)

## (undated) DEVICE — COVER LT HNDL BLU PLAS

## (undated) DEVICE — ZIMMER® STERILE DISPOSABLE TOURNIQUET CUFF WITH PROTECTIVE SLEEVE AND PLC, DUAL PORT, SINGLE BLADDER, 34 IN. (86 CM)

## (undated) DEVICE — APPLICATOR MEDICATED 10.5 CC SOLUTION HI LT ORNG CHLORAPREP

## (undated) DEVICE — TOWEL,OR,DSP,ST,NATURAL,DLX,4/PK,20PK/CS: Brand: MEDLINE

## (undated) DEVICE — SYRINGE MED 10ML LUERLOCK TIP W/O SFTY DISP

## (undated) DEVICE — SPONGE DRN W4XL4IN RAYON/POLYESTER 6 PLY NONWOVEN PRECUT

## (undated) DEVICE — MIC* SAFETY PERCUTANEOUS ENDOSCOPIC GASTROSTOMY PEG KIT - 20 FR - PULL: Brand: MIC PEG TUBE

## (undated) DEVICE — BINDER ABD 4 PANEL 82-100 IN 3XL 12 IN COTTON PROCARE

## (undated) DEVICE — GOWN,AURORA,NONREINFORCED,LARGE: Brand: MEDLINE

## (undated) DEVICE — GLOVE ORANGE PI 7 1/2   MSG9075

## (undated) DEVICE — ORTHO EXT PK

## (undated) DEVICE — SUTURE PERMAHAND SZ 2-0 L12X18IN NONABSORBABLE BLK SILK A185H

## (undated) DEVICE — SVMMC HD AND NK PK

## (undated) DEVICE — SPONGE,PEANUT,XRAY,ST,SM,3/8",5/CARD: Brand: MEDLINE INDUSTRIES, INC.

## (undated) DEVICE — DRESSING,GAUZE,XEROFORM,CURAD,1"X8",ST: Brand: CURAD

## (undated) DEVICE — BANDAGE,GAUZE,BULKEE II,4.5"X4.1YD,STRL: Brand: MEDLINE

## (undated) DEVICE — GLOVE ORTHO 8   MSG9480